# Patient Record
Sex: FEMALE | Race: BLACK OR AFRICAN AMERICAN
[De-identification: names, ages, dates, MRNs, and addresses within clinical notes are randomized per-mention and may not be internally consistent; named-entity substitution may affect disease eponyms.]

---

## 2019-09-04 ENCOUNTER — HOSPITAL ENCOUNTER (INPATIENT)
Dept: HOSPITAL 72 - 2E | Age: 84
LOS: 5 days | Discharge: NURSING, REHAB OR LONG-TERM CARE | DRG: 871 | End: 2019-09-09
Payer: MEDICARE

## 2019-09-04 VITALS — HEIGHT: 62 IN | BODY MASS INDEX: 42.31 KG/M2 | WEIGHT: 229.9 LBS

## 2019-09-04 VITALS — SYSTOLIC BLOOD PRESSURE: 127 MMHG | DIASTOLIC BLOOD PRESSURE: 68 MMHG

## 2019-09-04 DIAGNOSIS — J20.9: ICD-10-CM

## 2019-09-04 DIAGNOSIS — L03.116: ICD-10-CM

## 2019-09-04 DIAGNOSIS — E66.01: ICD-10-CM

## 2019-09-04 DIAGNOSIS — N17.9: ICD-10-CM

## 2019-09-04 DIAGNOSIS — I50.33: ICD-10-CM

## 2019-09-04 DIAGNOSIS — A40.1: Primary | ICD-10-CM

## 2019-09-04 DIAGNOSIS — I11.0: ICD-10-CM

## 2019-09-04 DIAGNOSIS — L89.310: ICD-10-CM

## 2019-09-04 DIAGNOSIS — Z79.82: ICD-10-CM

## 2019-09-04 DIAGNOSIS — I10: ICD-10-CM

## 2019-09-04 PROCEDURE — 82270 OCCULT BLOOD FECES: CPT

## 2019-09-04 PROCEDURE — 87070 CULTURE OTHR SPECIMN AEROBIC: CPT

## 2019-09-04 PROCEDURE — 89050 BODY FLUID CELL COUNT: CPT

## 2019-09-04 PROCEDURE — 84100 ASSAY OF PHOSPHORUS: CPT

## 2019-09-04 PROCEDURE — 85651 RBC SED RATE NONAUTOMATED: CPT

## 2019-09-04 PROCEDURE — 86140 C-REACTIVE PROTEIN: CPT

## 2019-09-04 PROCEDURE — 87040 BLOOD CULTURE FOR BACTERIA: CPT

## 2019-09-04 PROCEDURE — 83615 LACTATE (LD) (LDH) ENZYME: CPT

## 2019-09-04 PROCEDURE — 80202 ASSAY OF VANCOMYCIN: CPT

## 2019-09-04 PROCEDURE — 82746 ASSAY OF FOLIC ACID SERUM: CPT

## 2019-09-04 PROCEDURE — 84550 ASSAY OF BLOOD/URIC ACID: CPT

## 2019-09-04 PROCEDURE — 84484 ASSAY OF TROPONIN QUANT: CPT

## 2019-09-04 PROCEDURE — 80053 COMPREHEN METABOLIC PANEL: CPT

## 2019-09-04 PROCEDURE — 71045 X-RAY EXAM CHEST 1 VIEW: CPT

## 2019-09-04 PROCEDURE — 82550 ASSAY OF CK (CPK): CPT

## 2019-09-04 PROCEDURE — 82378 CARCINOEMBRYONIC ANTIGEN: CPT

## 2019-09-04 PROCEDURE — 82607 VITAMIN B-12: CPT

## 2019-09-04 PROCEDURE — 84300 ASSAY OF URINE SODIUM: CPT

## 2019-09-04 PROCEDURE — 93306 TTE W/DOPPLER COMPLETE: CPT

## 2019-09-04 PROCEDURE — 81001 URINALYSIS AUTO W/SCOPE: CPT

## 2019-09-04 PROCEDURE — 83735 ASSAY OF MAGNESIUM: CPT

## 2019-09-04 PROCEDURE — 93970 EXTREMITY STUDY: CPT

## 2019-09-04 PROCEDURE — 84133 ASSAY OF URINE POTASSIUM: CPT

## 2019-09-04 PROCEDURE — 85044 MANUAL RETICULOCYTE COUNT: CPT

## 2019-09-04 PROCEDURE — 85060 BLOOD SMEAR INTERPRETATION: CPT

## 2019-09-04 PROCEDURE — 83540 ASSAY OF IRON: CPT

## 2019-09-04 PROCEDURE — 83880 ASSAY OF NATRIURETIC PEPTIDE: CPT

## 2019-09-04 PROCEDURE — 87324 CLOSTRIDIUM AG IA: CPT

## 2019-09-04 PROCEDURE — 80048 BASIC METABOLIC PNL TOTAL CA: CPT

## 2019-09-04 PROCEDURE — 85025 COMPLETE CBC W/AUTO DIFF WBC: CPT

## 2019-09-04 PROCEDURE — 87205 SMEAR GRAM STAIN: CPT

## 2019-09-04 PROCEDURE — 85007 BL SMEAR W/DIFF WBC COUNT: CPT

## 2019-09-04 PROCEDURE — 36415 COLL VENOUS BLD VENIPUNCTURE: CPT

## 2019-09-04 PROCEDURE — 83550 IRON BINDING TEST: CPT

## 2019-09-04 PROCEDURE — 85610 PROTHROMBIN TIME: CPT

## 2019-09-04 PROCEDURE — 85730 THROMBOPLASTIN TIME PARTIAL: CPT

## 2019-09-04 NOTE — NUR
NURSE NOTES:



Received pt from [facility] via [facility] ambulance. Pt is being admitted for CHF. Pt is 
awake, AOx4. In no acute distress. VS stable. Placed pt on cardiac monitor showing SR. 
Oriented pt to room and unit. Bed in lowest position, call light within reach. Will contact 
MD for admission orders.

## 2019-09-05 VITALS — SYSTOLIC BLOOD PRESSURE: 124 MMHG | DIASTOLIC BLOOD PRESSURE: 56 MMHG

## 2019-09-05 VITALS — DIASTOLIC BLOOD PRESSURE: 63 MMHG | SYSTOLIC BLOOD PRESSURE: 131 MMHG

## 2019-09-05 VITALS — DIASTOLIC BLOOD PRESSURE: 60 MMHG | SYSTOLIC BLOOD PRESSURE: 130 MMHG

## 2019-09-05 VITALS — DIASTOLIC BLOOD PRESSURE: 51 MMHG | SYSTOLIC BLOOD PRESSURE: 116 MMHG

## 2019-09-05 VITALS — SYSTOLIC BLOOD PRESSURE: 133 MMHG | DIASTOLIC BLOOD PRESSURE: 65 MMHG

## 2019-09-05 LAB
ADD MANUAL DIFF: NO
ALBUMIN SERPL-MCNC: 2.4 G/DL (ref 3.4–5)
ALBUMIN/GLOB SERPL: 0.6 {RATIO} (ref 1–2.7)
ALP SERPL-CCNC: 45 U/L (ref 46–116)
ALT SERPL-CCNC: 7 U/L (ref 12–78)
ANION GAP SERPL CALC-SCNC: 14 MMOL/L (ref 5–15)
APPEARANCE UR: CLEAR
APTT PPP: (no result) S
AST SERPL-CCNC: 15 U/L (ref 15–37)
BASOPHILS NFR BLD AUTO: 0.4 % (ref 0–2)
BILIRUB SERPL-MCNC: 0.8 MG/DL (ref 0.2–1)
BUN SERPL-MCNC: 40 MG/DL (ref 7–18)
CALCIUM SERPL-MCNC: 8.8 MG/DL (ref 8.5–10.1)
CHLORIDE SERPL-SCNC: 107 MMOL/L (ref 98–107)
CK SERPL-CCNC: 54 U/L (ref 26–308)
CO2 SERPL-SCNC: 23 MMOL/L (ref 21–32)
CREAT SERPL-MCNC: 1.5 MG/DL (ref 0.55–1.3)
EOSINOPHIL NFR BLD AUTO: 0.9 % (ref 0–3)
ERYTHROCYTE [DISTWIDTH] IN BLOOD BY AUTOMATED COUNT: 12.5 % (ref 11.6–14.8)
GLOBULIN SER-MCNC: 3.9 G/DL
GLUCOSE UR STRIP-MCNC: NEGATIVE MG/DL
HCT VFR BLD CALC: 29.8 % (ref 37–47)
HGB BLD-MCNC: 10.2 G/DL (ref 12–16)
KETONES UR QL STRIP: NEGATIVE
LEUKOCYTE ESTERASE UR QL STRIP: NEGATIVE
LYMPHOCYTES NFR BLD AUTO: 8.4 % (ref 20–45)
MCV RBC AUTO: 85 FL (ref 80–99)
MONOCYTES NFR BLD AUTO: 9.2 % (ref 1–10)
NEUTROPHILS NFR BLD AUTO: 81.1 % (ref 45–75)
NITRITE UR QL STRIP: NEGATIVE
PH UR STRIP: 5 [PH] (ref 4.5–8)
PHOSPHATE SERPL-MCNC: 3.4 MG/DL (ref 2.5–4.9)
PLATELET # BLD: 159 K/UL (ref 150–450)
POTASSIUM SERPL-SCNC: 3 MMOL/L (ref 3.5–5.1)
PROT UR QL STRIP: NEGATIVE
RBC # BLD AUTO: 3.53 M/UL (ref 4.2–5.4)
SODIUM SERPL-SCNC: 144 MMOL/L (ref 136–145)
SP GR UR STRIP: 1 (ref 1–1.03)
UROBILINOGEN UR-MCNC: NORMAL MG/DL (ref 0–1)
WBC # BLD AUTO: 12.3 K/UL (ref 4.8–10.8)

## 2019-09-05 RX ADMIN — CARVEDILOL SCH MG: 12.5 TABLET, FILM COATED ORAL at 09:13

## 2019-09-05 RX ADMIN — SODIUM CHLORIDE SCH MLS/HR: 0.9 INJECTION INTRAVENOUS at 09:36

## 2019-09-05 RX ADMIN — CARVEDILOL SCH MG: 12.5 TABLET, FILM COATED ORAL at 20:38

## 2019-09-05 RX ADMIN — HEPARIN SODIUM SCH UNITS: 5000 INJECTION INTRAVENOUS; SUBCUTANEOUS at 09:35

## 2019-09-05 RX ADMIN — ASPIRIN SCH MG: 81 TABLET, DELAYED RELEASE ORAL at 09:13

## 2019-09-05 RX ADMIN — HEPARIN SODIUM SCH UNITS: 5000 INJECTION INTRAVENOUS; SUBCUTANEOUS at 20:36

## 2019-09-05 NOTE — NUR
NURSE NOTES:



Notified Dr. Yarbrough that pt's K level was 3.0 today, per Dr. Yarbrough okay to give Lasix 40 mg 
IVP dose.

## 2019-09-05 NOTE — NUR
NURSE NOTES:

Received patient from Jeffery Gue[facility]a. Patient is awake and alert. denies pain or discomfort. O2 @ 
2L/min via nasal cannula. Denies pain/Discomfort. safety precautions in place. side railsX2 
up for safety. bed locked to lowest position. call bell within patients reach. will follow.

## 2019-09-05 NOTE — NUR
NURSE NOTES:WOUND CARE NOTES:Morbidly obese pt whom presented on admission with multiple 
pressure injuries to R and L buttocks. 

Ful Thickness pressure injury R buttocks with 100% slough at base of wound. Surrounding 
non-blanching erythema without induration.(L)1.5cm x (W)0.6cm.

Full thickness pressure injury L buttocks with 100% slough at base of wound. Surrounding 
non-blanching erythema without induration.(L)1cm x (W)0.4cm.

R lower ext edematous, erythematous with Hyperkeratosis and hardening. R leg is malodorous. 
Ulcerations noted to distal dorsal and lateral R tibia and dorsal R foot . Small amt of 
serous exudate noted from ulcers.Gentle scrubbing to remove loosened skin provided. R lower 
ext remains malodorous post scrubbing. Moisturizing lotion applied to RLE. Xeroform gauze 
placed over each wound and wrapped with kerlix pending further instructions from Surgeon.

L lower ext less edematous in comparison to RLE . Hyperkeratosis with skin hardening noted 
.No erythema or ulcerations noted.Gentle scrubbing provided to remove loosened skin, then 
moisturized.Both lower ext elevated with pillows.   

Non-blanching erythema without fluctuance noted to both heels. Pt denied tenderness when 
each heel palpated.



Recommendations: Wound Care orders as per .

                          Elevate both lower ext with pillows.

                          Reposition at least every 2hours or as tolerated.

                          APM/MERRY Mattress overlay.

## 2019-09-05 NOTE — NUR
NURSE NOTES:



Received report from MARIELLA Walton. Pt is awake and resting in bed. In no acute distress. IV line 
intact and patent. Pt on cardiac monitor. Bed in lowest position, call light within reach. 
Will continue plan of care.

## 2019-09-05 NOTE — NUR
NURSE NOTES:

Received a call from Dr. Duff of [facility] regarding patients blood culture results 
2 out of 2 bottles positive. Dr. Yarbrough made aware new order received to consult ID Dr. Elizabeth. Will follow.

## 2019-09-05 NOTE — HISTORY & PHYSICAL
History and Physical


History & Physicial


Dictated for Int Med-DR Yarbrough no.[id number].











Sixto Hardy MD Sep 5, 2019 16:30

## 2019-09-05 NOTE — HISTORY AND PHYSICAL REPORT
DATE OF ADMISSION:  09/04/2019

CHIEF COMPLAINT:  The patient is an 84-year-old  female,

who presents with a chief complaint of shortness of breath.



HISTORY OF PRESENT ILLNESS:  Began on Sunday, September 1, 2019.  The

patient began to experience shortness of breath.  The patient was also

wheezing.  The patient also had edema of the left lower extremity.  The

patient states shortness of breath increased on September 4, 2019.  The

patient eventually called EMS.  The patient was transported initially to

[facility] emergency room.  The patient was found to have elevated

BNP.  The patient is transferred to [facility] for insurance

purposes.  The patient presents with chief complaint of shortness of

breath and congestive heart failure.



REVIEW OF SYSTEMS:  CONSTITUTIONAL:  The patient denies weight loss or

weight gain.  The patient denies fevers or chills.  HEENT:  The patient

denies ear or throat pain.  The patient denies headache.  CARDIOVASCULAR:

The patient denies palpitations or chest pain.  CHEST:  The patient

complains of shortness of breath and wheezing as above.  The patient

denies cough.  ABDOMEN:  The patient denies nausea, vomiting, diarrhea, or

constipation.  GENITOURINARY:  The patient denies dysuria or increased

frequency of urination.  NEUROMUSCULAR:  The patient denies seizures or

generalized weakness.



PAST MEDICAL HISTORY:  Significant for hypertension.



PAST SURGICAL HISTORY:  Significant for appendectomy.



CURRENT MEDICATIONS:

1. Aspirin 81 mg one tablet p.o. daily.

2. Calcium carbonate one tablet p.o. twice daily.

3. Carvedilol 25 mg p.o. twice daily.

4. Chlorthalidone 25 mg p.o. daily.

5. Lasix 20 mg p.o. twice daily.

6. Ibuprofen 200 mg p.o. q.6 hours p.r.n.

7. Losartan 50 mg p.o. daily.



ALLERGIES:  No known drug allergies.



SOCIAL HISTORY:  The patient is single and lives alone.  The patient denies

tobacco or alcohol use.



PHYSICAL EXAMINATION:

VITAL SIGNS:  Blood pressure 141/67, pulse 95, respirations 29 to 34, and

temperature 98.7 degrees Fahrenheit.

GENERAL:  The patient is a well-developed and well-nourished 

American female, in no apparent distress.

HEENT:  Eyes, pupils are equal and responsive to light and accommodation.

Extraocular movements are intact.

NECK:  Supple without lymphadenopathy.

CHEST:  Lungs are clear to auscultation bilaterally without wheezes or

rales.

CARDIOVASCULAR:  Regular rhythm and rate.  S1 and S2 are normal without

murmurs, rubs, or gallops.

ABDOMEN:  Soft, nontender, and nondistended.  Positive bowel sounds.  No

evidence of hepatosplenomegaly.  Currently, no rebound or guarding

noted.

EXTREMITIES:  Negative for clubbing, cyanosis, or edema.

NEUROLOGICAL:  Cranial nerves II through XII are grossly intact without

focal deficits.  Motor strength is 5/5 bilaterally.  Deep tendon reflexes

are 2+ plantar.



IMAGING:  A chest x-ray from [place] revealed cardiomegaly with increased

interstitial lung markings consistent with congestive heart failure.



LABORATORY STUDIES:  WBC 15.7, hemoglobin 11.2, hematocrit 34.5, and

platelets 201,000.  Sodium 143, potassium 3.3, chloride 103, CO2 24, BUN

40, and creatinine 1.75.  BNP elevated at 200.  Troponin less than 0.02.



ASSESSMENT:  This is an 84-year-old  female.



1. Shortness of breath.

2. Wheezing.

3. Edema of the bilateral lower extremities.

4. Congestive heart failure.

5. Hypertension.



TREATMENT:

1. Shortness of breath/wheezing/congestive heart failure.  Cardiology

consultation has been obtained with Dr. Carlos Ornelas.  An

echocardiogram is pending.  We will follow recommendation of Cardiology.

2. Hypertension.  Continue chlorthalidone, losartan, and carvedilol as

above.









  ______________________________________________

  Sixto Hardy M.D.





DR:  TANI

D:  09/05/2019 16:24

T:  09/05/2019 17:39

JOB#:  [id number]

CC:

## 2019-09-05 NOTE — CONSULTATION
History of Present Illness


General


Date patient seen:  Sep 5, 2019





Present Illness


HPI


84 year old, poor historian, with hx of CHF ( pt is on Lasix and Lisinopril), 

HTN, morbid obesity, was taken by paramedics to [facility] b/o increasing 

shortness of breath.  Her CXR at [place] only showed increased interstitial 

markings.  The physician at [place] thought that she has acute renal 

insufficiency, lymphedema and cellulitis.  She is transferred to [place] for 

further evaluation.


Allergies:  


Coded Allergies:  


     No Known Allergies (Unverified , 9/5/19)





Medication History


Scheduled


Aspirin* (Aspir 81*), 81 MG ORAL DAILY, (Reported)


Calcium Carbonate/Vitamin D3 (Calcium 600 + Vit D 200 Tablet), 1 EACH PO BID, (

Reported)


Carvedilol* (Carvedilol*), 25 MG ORAL BID, (Reported)


Chlorthalidone* (Chlorthalidone*), 25 MG ORAL DAILY, (Reported)


Furosemide* (Lasix*), 20 MG ORAL BID, (Reported)


Losartan Potassium* (Losartan Potassium*), 50 MG ORAL DAILY, (Reported)





Scheduled PRN


Ibuprofen* (Advil*), 200 MG ORAL Q6H PRN for Pain Scale (6-10), (Reported)





Patient History


Healthcare decision maker





Resuscitation status


Full Code


Advanced Directive on File








Past Medical/Surgical History


Past Medical/Surgical History:  


(1) Cellulitis


(2) History of hypertension


(3) Cardiomegaly


(4) Peripheral edema





Review of Systems


Respiratory:  Reports: shortness of breath





Physical Exam


General Appearance:  WD/WN, morbidly obese


Lines, tubes and drains:  peripheral


HEENT:  normocephalic, atraumatic


Neck:  non-tender, normal alignment


Respiratory/Chest:  chest wall non-tender, lungs clear


Breasts:  no masses


Cardiovascular/Chest:  normal peripheral pulses


Abdomen:  normal bowel sounds, non tender


Genitourinary/Rectal:  normal genital exam


Extremities:  normal range of motion


Skin Exam:  other - hyperpigmentation in lower extremities





Last 24 Hour Vital Signs








  Date Time  Temp Pulse Resp B/P (MAP) Pulse Ox O2 Delivery O2 Flow Rate FiO2


 


9/5/19 10:08 98.1       


 


9/5/19 09:13  84  133/65    


 


9/5/19 08:00  80      


 


9/5/19 08:00 98.1 82 23 133/65 (87) 96   


 


9/5/19 04:00 98.5 79 18 130/60 (83) 96   


 


9/5/19 04:00  79      


 


9/5/19 00:00      Nasal Cannula 2.0 


 


9/4/19 23:30 99.9 90 20 127/68 (87) 96   


 


9/4/19 23:30  90      











Laboratory Tests








Test


  9/5/19


07:18


 


White Blood Count


  12.3 K/UL


(4.8-10.8)  H


 


Red Blood Count


  3.53 M/UL


(4.20-5.40)  L


 


Hemoglobin


  10.2 G/DL


(12.0-16.0)  L


 


Hematocrit


  29.8 %


(37.0-47.0)  L


 


Mean Corpuscular Volume 85 FL (80-99)  


 


Mean Corpuscular Hemoglobin


  28.9 PG


(27.0-31.0)


 


Mean Corpuscular Hemoglobin


Concent 34.2 G/DL


(32.0-36.0)


 


Red Cell Distribution Width


  12.5 %


(11.6-14.8)


 


Platelet Count


  159 K/UL


(150-450)


 


Mean Platelet Volume


  6.5 FL


(6.5-10.1)


 


Neutrophils (%) (Auto)


  81.1 %


(45.0-75.0)  H


 


Lymphocytes (%) (Auto)


  8.4 %


(20.0-45.0)  L


 


Monocytes (%) (Auto)


  9.2 %


(1.0-10.0)


 


Eosinophils (%) (Auto)


  0.9 %


(0.0-3.0)


 


Basophils (%) (Auto)


  0.4 %


(0.0-2.0)


 


Sodium Level


  144 MMOL/L


(136-145)


 


Potassium Level


  3.0 MMOL/L


(3.5-5.1)  L


 


Chloride Level


  107 MMOL/L


()


 


Carbon Dioxide Level


  23 MMOL/L


(21-32)


 


Anion Gap


  14 mmol/L


(5-15)


 


Blood Urea Nitrogen


  40 mg/dL


(7-18)  H


 


Creatinine


  1.5 MG/DL


(0.55-1.30)  H


 


Estimat Glomerular Filtration


Rate  mL/min (>60)  


 


 


Glucose Level


  90 MG/DL


()


 


Calcium Level


  8.8 MG/DL


(8.5-10.1)


 


Phosphorus Level


  3.4 MG/DL


(2.5-4.9)


 


Magnesium Level


  1.9 MG/DL


(1.8-2.4)


 


Total Bilirubin


  0.8 MG/DL


(0.2-1.0)


 


Aspartate Amino Transf


(AST/SGOT) 15 U/L (15-37)


 


 


Alanine Aminotransferase


(ALT/SGPT) 7 U/L (12-78)


L


 


Alkaline Phosphatase


  45 U/L


()  L


 


Troponin I


  0.000 ng/mL


(0.000-0.056)


 


Pro-B-Type Natriuretic Peptide


  835 pg/mL


(0-125)  H


 


Total Protein


  6.3 G/DL


(6.4-8.2)  L


 


Albumin


  2.4 G/DL


(3.4-5.0)  L


 


Globulin 3.9 g/dL  


 


Albumin/Globulin Ratio


  0.6 (1.0-2.7)


L








Height (Feet):  5


Height (Inches):  2.00


Weight (Pounds):  220


Medications





Current Medications








 Medications


  (Trade)  Dose


 Ordered  Sig/Imelda


 Route


 PRN Reason  Start Time


 Stop Time Status Last Admin


Dose Admin


 


 Acetaminophen


  (Tylenol)  650 mg  Q6H  PRN


 ORAL


 Mild Pain/Temp > 100.5  9/5/19 06:45


 10/5/19 06:44   


 


 


 Aspirin


  (Ecotrin)  81 mg  DAILY


 ORAL


   9/5/19 09:00


 10/5/19 08:59  9/5/19 09:13


 


 


 Carvedilol


  (Coreg)  12.5 mg  EVERY 12  HOURS


 ORAL


   9/5/19 09:00


 10/5/19 08:59  9/5/19 09:13


 


 


 Ceftriaxone


 Sodium 1 gm/


 Dextrose  55 ml @ 


 110 mls/hr  DAILY


 IVPB


   9/5/19 09:00


 9/12/19 08:59  9/5/19 09:36


 


 


 Furosemide


  (Lasix)  40 mg  EVERY 12  HOURS


 IV


   9/5/19 09:00


 10/5/19 08:59  9/5/19 09:13


 


 


 Heparin Sodium


  (Porcine)


  (Heparin 5000


 units/ml)  5,000 units  EVERY 12  HOURS


 SUBQ


   9/5/19 09:00


 10/5/19 08:59  9/5/19 09:35


 


 


 Ibuprofen


  (Advil)  400 mg  Q8H  PRN


 ORAL


 Moderate Pain (Pain Scale 4-6)  9/5/19 06:45


 10/5/19 06:44  9/5/19 09:38


 


 


 Ondansetron HCl


  (Zofran)  4 mg  Q4H  PRN


 IVP


 Nausea & Vomiting  9/5/19 06:45


 10/5/19 06:44   


 


 


 Potassium Chloride


  (K-Dur)  40 meq  Q8H


 ORAL


   9/5/19 09:00


 9/6/19 01:01  9/5/19 09:13


 


 


 Vancomycin HCl


  (Vanco rx to


 dose)  1 ea  DAILY  PRN


 MISC


 Per rx protocol  9/5/19 06:45


 10/5/19 06:44   


 


 


 Vancomycin HCl/


 Dextrose  275 ml @ 


 137.5 mls/


 hr  ONCE


 IVPB


   9/5/19 11:00


 9/5/19 13:00   


 











Assessment/Plan


Problem List:  


(1) Acute bronchitis


ICD Codes:  J20.9 - Acute bronchitis, unspecified


SNOMED:  20407279


(2) Cellulitis


ICD Codes:  L03.90 - Cellulitis, unspecified


SNOMED:  309620577


(3) CHF (congestive heart failure)


ICD Codes:  I50.9 - Heart failure, unspecified


SNOMED:  47280939


(4) Cardiomegaly


ICD Codes:  I51.7 - Cardiomegaly


SNOMED:  9866998


(5) Peripheral edema


ICD Codes:  R60.9 - Edema, unspecified


SNOMED:  941902528


(6) History of hypertension


ICD Codes:  Z86.79 - Personal history of other diseases of the circulatory 

system


SNOMED:  397437026


(7) Morbid obesity


ICD Codes:  E66.01 - Morbid (severe) obesity due to excess calories


SNOMED:  750845793


Assessment/Plan:


check echo, doppler of legs


diuretics IV


monitor BP


renal studies


anemia w/u


dvt prophylaxis


respiratory treatment


antitussives


abx for bronchitis and cellulitis


check ESR.











Aleisha Lagunas MD Sep 5, 2019 10:58

## 2019-09-06 VITALS — SYSTOLIC BLOOD PRESSURE: 111 MMHG | DIASTOLIC BLOOD PRESSURE: 58 MMHG

## 2019-09-06 VITALS — DIASTOLIC BLOOD PRESSURE: 51 MMHG | SYSTOLIC BLOOD PRESSURE: 118 MMHG

## 2019-09-06 VITALS — DIASTOLIC BLOOD PRESSURE: 70 MMHG | SYSTOLIC BLOOD PRESSURE: 109 MMHG

## 2019-09-06 VITALS — SYSTOLIC BLOOD PRESSURE: 144 MMHG | DIASTOLIC BLOOD PRESSURE: 69 MMHG

## 2019-09-06 VITALS — SYSTOLIC BLOOD PRESSURE: 117 MMHG | DIASTOLIC BLOOD PRESSURE: 75 MMHG

## 2019-09-06 VITALS — DIASTOLIC BLOOD PRESSURE: 71 MMHG | SYSTOLIC BLOOD PRESSURE: 129 MMHG

## 2019-09-06 LAB
% IRON SATURATION: 11 % (ref 15–50)
ADD MANUAL DIFF: YES
ANION GAP SERPL CALC-SCNC: 10 MMOL/L (ref 5–15)
APTT BLD: 35 SEC (ref 23–33)
BUN SERPL-MCNC: 32 MG/DL (ref 7–18)
CALCIUM SERPL-MCNC: 8.7 MG/DL (ref 8.5–10.1)
CHLORIDE SERPL-SCNC: 108 MMOL/L (ref 98–107)
CO2 SERPL-SCNC: 27 MMOL/L (ref 21–32)
CREAT SERPL-MCNC: 1.3 MG/DL (ref 0.55–1.3)
ERYTHROCYTE [DISTWIDTH] IN BLOOD BY AUTOMATED COUNT: 12.6 % (ref 11.6–14.8)
HCT VFR BLD CALC: 33.8 % (ref 37–47)
HGB BLD-MCNC: 11 G/DL (ref 12–16)
INR PPP: 1 (ref 0.9–1.1)
IRON SERPL-MCNC: 17 UG/DL (ref 50–175)
LDH SERPL L TO P-CCNC: 242 U/L (ref 81–234)
MCV RBC AUTO: 87 FL (ref 80–99)
PHOSPHATE SERPL-MCNC: 2.4 MG/DL (ref 2.5–4.9)
PLATELET # BLD: 186 K/UL (ref 150–450)
POTASSIUM SERPL-SCNC: 4.2 MMOL/L (ref 3.5–5.1)
RBC # BLD AUTO: 3.88 M/UL (ref 4.2–5.4)
SODIUM SERPL-SCNC: 145 MMOL/L (ref 136–145)
TIBC SERPL-MCNC: 153 UG/DL (ref 250–450)
UNSATURATED IRON BINDING: 136 UG/DL (ref 112–346)
WBC # BLD AUTO: 13 K/UL (ref 4.8–10.8)

## 2019-09-06 RX ADMIN — ASPIRIN SCH MG: 81 TABLET, DELAYED RELEASE ORAL at 08:45

## 2019-09-06 RX ADMIN — SODIUM CHLORIDE SCH MLS/HR: 0.9 INJECTION INTRAVENOUS at 08:47

## 2019-09-06 RX ADMIN — CARVEDILOL SCH MG: 12.5 TABLET, FILM COATED ORAL at 20:54

## 2019-09-06 RX ADMIN — HYDROCODONE BITARTRATE AND ACETAMINOPHEN PRN TAB: 5; 325 TABLET ORAL at 09:54

## 2019-09-06 RX ADMIN — HEPARIN SODIUM SCH UNITS: 5000 INJECTION INTRAVENOUS; SUBCUTANEOUS at 20:54

## 2019-09-06 RX ADMIN — HYDROCODONE BITARTRATE AND ACETAMINOPHEN PRN TAB: 5; 325 TABLET ORAL at 15:38

## 2019-09-06 RX ADMIN — HEPARIN SODIUM SCH UNITS: 5000 INJECTION INTRAVENOUS; SUBCUTANEOUS at 08:46

## 2019-09-06 RX ADMIN — HYDROCODONE BITARTRATE AND ACETAMINOPHEN PRN TAB: 5; 325 TABLET ORAL at 19:53

## 2019-09-06 RX ADMIN — HYDROCODONE BITARTRATE AND ACETAMINOPHEN PRN TAB: 5; 325 TABLET ORAL at 03:53

## 2019-09-06 RX ADMIN — CARVEDILOL SCH MG: 12.5 TABLET, FILM COATED ORAL at 08:45

## 2019-09-06 NOTE — NUR
NURSE NOTES:

Received pt and report from MARIELLA Woods. Observed pt resting in bed with both eyes closed; 
arousable to voice. Pt is A/O x4. Cardiac monitor is in placed, IV site intact, 
asymptomatic, and patent. Bed is in the lowest position and locked. Call light within each. 
No signs/symptoms of acute distress noted at this time. Will continue plan of care.

## 2019-09-06 NOTE — CONSULTATION
History of Present Illness


General


Date patient seen:  Sep 5, 2019


Time patient seen:  12:00


Referring physician:  Janneth


Reason for Consultation:  Buttock ulcer





Present Illness


HPI


Asked to evaluate this patient for right medial buttock ulcer. She was admitted 

to [facility] [place] and was found to have this ulcer on admission. She lives at home 

and per history is ambulatory. She has a h/o chronic venous insufficiency and 

LLE ulcers that has been managed by her podiatrist in the past. She is 

apparently not aware of having a buttock ulcer.


Allergies:  


Coded Allergies:  


     No Known Allergies (Unverified , 9/5/19)





Medication History


Scheduled


Aspirin* (Aspir 81*), 81 MG ORAL DAILY, (Reported)


Calcium Carbonate/Vitamin D3 (Calcium 600 + Vit D 200 Tablet), 1 EACH PO BID, (

Reported)


Carvedilol* (Carvedilol*), 25 MG ORAL BID, (Reported)


Chlorthalidone* (Chlorthalidone*), 25 MG ORAL DAILY, (Reported)


Furosemide* (Lasix*), 20 MG ORAL BID, (Reported)


Losartan Potassium* (Losartan Potassium*), 50 MG ORAL DAILY, (Reported)





Scheduled PRN


Ibuprofen* (Advil*), 200 MG ORAL Q6H PRN for Pain Scale (6-10), (Reported)





Patient History


History Provided By:  Patient, Medical Record


Healthcare decision maker





Resuscitation status


Full Code


Advanced Directive on File








Past Medical/Surgical History


Past Medical/Surgical History:  


(1) Morbid obesity


(2) History of hypertension


(3) Acute on chronic diastolic congestive heart failure


(4) Mild pulmonary hypertension





Review of Systems


Respiratory:  Reports: see HPI


Musculoskeletal:  Reports: back pain


Skin:  Reports: see HPI





Physical Exam


General Appearance:  WD/WN, no apparent distress, alert, morbidly obese


Lines, tubes and drains:  peripheral


Extremities:  moderate edema


Skin Exam:  other - Ulcer of right medial buttock is unstageable. Skin around 

sacral area is stage 1. No erythema or warmth. No fluctuance.





Last 24 Hour Vital Signs








  Date Time  Temp Pulse Resp B/P (MAP) Pulse Ox O2 Delivery O2 Flow Rate FiO2


 


9/6/19 16:00  79      


 


9/6/19 16:00 97.9 85 20 109/70 (83) 94   


 


9/6/19 12:20  72      


 


9/6/19 11:45 98.4 74 18 111/58 (75) 96   


 


9/6/19 09:00      Nasal Cannula 2.0 


 


9/6/19 08:45  73  144/69    


 


9/6/19 08:00 97.7 73 18 144/69 (94) 96   


 


9/6/19 04:00  69      


 


9/6/19 04:00 97.7 69 19 117/75 (89) 99   


 


9/6/19 00:00 97.0 68 20 118/51 (73) 96   


 


9/5/19 21:00      Nasal Cannula 2.0 


 


9/5/19 20:38  75  124/56    


 


9/5/19 20:00  71      


 


9/5/19 20:00 98.2 71 20 124/56 (78) 98   

















Intake and Output  


 


 9/5/19 9/6/19





 19:00 07:00


 


Intake Total 120 ml 200 ml


 


Output Total 800 ml 1500 ml


 


Balance -680 ml -1300 ml


 


  


 


Intake Oral 120 ml 200 ml


 


Output Urine Total 800 ml 1500 ml


 


# Bowel Movements 1 1











Laboratory Tests








Test


  9/6/19


05:19


 


White Blood Count


  13.0 K/UL


(4.8-10.8)  H


 


Red Blood Count


  3.88 M/UL


(4.20-5.40)  L


 


Hemoglobin


  11.0 G/DL


(12.0-16.0)  L


 


Hematocrit


  33.8 %


(37.0-47.0)  L


 


Mean Corpuscular Volume 87 FL (80-99)  


 


Mean Corpuscular Hemoglobin


  28.4 PG


(27.0-31.0)


 


Mean Corpuscular Hemoglobin


Concent 32.7 G/DL


(32.0-36.0)


 


Red Cell Distribution Width


  12.6 %


(11.6-14.8)


 


Platelet Count


  186 K/UL


(150-450)


 


Mean Platelet Volume


  6.8 FL


(6.5-10.1)


 


Neutrophils (%) (Auto)


  % (45.0-75.0)


 


 


Lymphocytes (%) (Auto)


  % (20.0-45.0)


 


 


Monocytes (%) (Auto)  % (1.0-10.0)  


 


Eosinophils (%) (Auto)  % (0.0-3.0)  


 


Basophils (%) (Auto)  % (0.0-2.0)  


 


Differential Total Cells


Counted 100  


 


 


Neutrophils % (Manual) 77 % (45-75)  H


 


Lymphocytes % (Manual) 12 % (20-45)  L


 


Monocytes % (Manual) 7 % (1-10)  


 


Eosinophils % (Manual) 4 % (0-3)  H


 


Basophils % (Manual) 0 % (0-2)  


 


Band Neutrophils 0 % (0-8)  


 


Other Cell Type


  Pathologist


review


 


Platelet Estimate Adequate  


 


Platelet Morphology Normal  


 


Red Blood Cell Morphology Normal  


 


Erythrocyte Sedimentation Rate


  74 MM/HR


(0-30)  H


 


Reticulocyte Count


  0.3 %


(0.5-2.0)  L


 


Prothrombin Time


  10.6 SEC


(9.30-11.50)


 


Prothromb Time International


Ratio 1.0 (0.9-1.1)  


 


 


Activated Partial


Thromboplast Time 35 SEC (23-33)


H


 


Sodium Level


  145 MMOL/L


(136-145)


 


Potassium Level


  4.2 MMOL/L


(3.5-5.1)


 


Chloride Level


  108 MMOL/L


()  H


 


Carbon Dioxide Level


  27 MMOL/L


(21-32)


 


Anion Gap


  10 mmol/L


(5-15)


 


Blood Urea Nitrogen


  32 mg/dL


(7-18)  H


 


Creatinine


  1.3 MG/DL


(0.55-1.30)


 


Estimat Glomerular Filtration


Rate  mL/min (>60)  


 


 


Glucose Level


  111 MG/DL


()  H


 


Calcium Level


  8.7 MG/DL


(8.5-10.1)


 


Phosphorus Level


  2.4 MG/DL


(2.5-4.9)  L


 


Magnesium Level


  1.7 MG/DL


(1.8-2.4)  L


 


Iron Level


  17 ug/dL


()  L


 


Total Iron Binding Capacity


  153 ug/dL


(250-450)  L


 


Percent Iron Saturation 11 % (15-50)  L


 


Unsaturated Iron Binding


  136 ug/dL


(112-346)


 


Lactate Dehydrogenase


  242 U/L


()  H


 


Troponin I


  0.000 ng/mL


(0.000-0.056)


 


C-Reactive Protein,


Quantitative 35.4 mg/dL


(0.00-0.90)  H


 


Pro-B-Type Natriuretic Peptide


  2535 pg/mL


(0-125)  H


 


Carcinoembryonic Antigen Pending  


 


Vitamin B12 Level


  728 PG/ML


(193-986)


 


Folate


  4.0 NG/ML


(8.6-58.9)  L


 


Random Vancomycin Level 14.4 ug/mL  








Height (Feet):  5


Height (Inches):  2.00


Weight (Pounds):  210


Medications





Current Medications








 Medications


  (Trade)  Dose


 Ordered  Sig/Imelda


 Route


 PRN Reason  Start Time


 Stop Time Status Last Admin


Dose Admin


 


 Acetaminophen


  (Tylenol)  650 mg  Q6H  PRN


 ORAL


 Mild Pain/Temp > 100.5  9/5/19 06:45


 10/5/19 06:44  9/6/19 03:01


 


 


 Acetaminophen/


 Hydrocodone Bitart


  (Norco 5/325)  1 tab  Q4H  PRN


 ORAL


 Moderate Pain (Pain Scale 4-6)  9/6/19 11:15


 9/13/19 11:14  9/6/19 15:38


 


 


 Albuterol/


 Ipratropium


  (Albuterol/


 Ipratropium)  3 ml  Q4H  PRN


 HHN


 Shortness of Breath  9/6/19 11:15


 9/11/19 11:14   


 


 


 Aspirin


  (Ecotrin)  81 mg  DAILY


 ORAL


   9/5/19 09:00


 10/5/19 08:59  9/6/19 08:45


 


 


 Carvedilol


  (Coreg)  12.5 mg  EVERY 12  HOURS


 ORAL


   9/5/19 09:00


 10/5/19 08:59  9/6/19 08:45


 


 


 Ceftriaxone


 Sodium 1 gm/


 Dextrose  55 ml @ 


 110 mls/hr  DAILY


 IVPB


   9/5/19 09:00


 9/12/19 08:59  9/6/19 08:47


 


 


 Furosemide


  (Lasix)  40 mg  EVERY 12  HOURS


 IV


   9/6/19 21:00


 10/5/19 08:59   


 


 


 Heparin Sodium


  (Porcine)


  (Heparin 5000


 units/ml)  5,000 units  EVERY 12  HOURS


 SUBQ


   9/5/19 09:00


 10/5/19 08:59  9/6/19 08:46


 


 


 Ondansetron HCl


  (Zofran)  4 mg  Q4H  PRN


 IVP


 Nausea & Vomiting  9/5/19 06:45


 10/5/19 06:44   


 


 


 Promethazine HCl/


 Codeine


  (Phenergan with


 Codeine)  5 ml  Q4H  PRN


 ORAL


 For Cough  9/5/19 11:00


 10/5/19 10:59   


 


 


 Vancomycin HCl


  (Vanco rx to


 dose)  1 ea  DAILY  PRN


 MISC


 Per rx protocol  9/5/19 06:45


 10/5/19 06:44   


 


 


 Vancomycin HCl/


 Dextrose  275 ml @ 


 137.5 mls/


 hr  Q24H


 IVPB


   9/7/19 09:00


 9/12/19 08:59   


 











Assessment/Plan


Status:  stable


Assessment/Plan:


Patient with unstageable ulcer of the right medial buttock. Recommend medihoney 

to the area to loosen the slough and determine more accurate staging. Need to 

keep moisture under control and offload every 2 hours. No need for surgical 

intervention at this time. Thank you.











Rafiq Spear MD Sep 6, 2019 17:45

## 2019-09-06 NOTE — INTERNAL MED PROGRESS NOTE
Subjective


Physician Name


Chris Yarbrough


Attending Physician


Chris Yarbrough MD





Current Medications








 Medications


  (Trade)  Dose


 Ordered  Sig/Imelda


 Route


 PRN Reason  Start Time


 Stop Time Status Last Admin


Dose Admin


 


 Acetaminophen


  (Tylenol)  650 mg  Q6H  PRN


 ORAL


 Mild Pain/Temp > 100.5  9/5/19 06:45


 10/5/19 06:44  9/6/19 03:01


 


 


 Acetaminophen/


 Hydrocodone Bitart


  (Norco 5/325)  1 tab  Q4H  PRN


 ORAL


 Moderate Pain (Pain Scale 4-6)  9/6/19 11:15


 9/13/19 11:14   


 


 


 Albuterol/


 Ipratropium


  (Albuterol/


 Ipratropium)  3 ml  Q4H  PRN


 HHN


 Shortness of Breath  9/6/19 11:15


 9/11/19 11:14   


 


 


 Aspirin


  (Ecotrin)  81 mg  DAILY


 ORAL


   9/5/19 09:00


 10/5/19 08:59  9/6/19 08:45


 


 


 Carvedilol


  (Coreg)  12.5 mg  EVERY 12  HOURS


 ORAL


   9/5/19 09:00


 10/5/19 08:59  9/6/19 08:45


 


 


 Ceftriaxone


 Sodium 1 gm/


 Dextrose  55 ml @ 


 110 mls/hr  DAILY


 IVPB


   9/5/19 09:00


 9/12/19 08:59  9/6/19 08:47


 


 


 Furosemide


  (Lasix)  40 mg  EVERY 12  HOURS


 IV


   9/6/19 21:00


 10/5/19 08:59   


 


 


 Heparin Sodium


  (Porcine)


  (Heparin 5000


 units/ml)  5,000 units  EVERY 12  HOURS


 SUBQ


   9/5/19 09:00


 10/5/19 08:59  9/6/19 08:46


 


 


 Ondansetron HCl


  (Zofran)  4 mg  Q4H  PRN


 IVP


 Nausea & Vomiting  9/5/19 06:45


 10/5/19 06:44   


 


 


 Promethazine HCl/


 Codeine


  (Phenergan with


 Codeine)  5 ml  Q4H  PRN


 ORAL


 For Cough  9/5/19 11:00


 10/5/19 10:59   


 


 


 Vancomycin HCl


  (Vanco rx to


 dose)  1 ea  DAILY  PRN


 MISC


 Per rx protocol  9/5/19 06:45


 10/5/19 06:44   


 


 


 Vancomycin HCl/


 Dextrose  275 ml @ 


 137.5 mls/


 hr  Q24H


 IVPB


   9/7/19 09:00


 9/12/19 08:59   


 








Allergies:  


Coded Allergies:  


     No Known Allergies (Unverified , 9/5/19)


Subjective


awake, alert, responsive, NAD, No CP or SOB.





Objective





Last Vital Signs








  Date Time  Temp Pulse Resp B/P (MAP) Pulse Ox O2 Delivery O2 Flow Rate FiO2


 


9/6/19 12:20  72      


 


9/6/19 11:45 98.4  18 111/58 (75) 96   


 


9/6/19 09:00      Nasal Cannula 2.0 











Laboratory Tests








Test


  9/6/19


05:19


 


White Blood Count


  13.0 K/UL


(4.8-10.8)  H


 


Red Blood Count


  3.88 M/UL


(4.20-5.40)  L


 


Hemoglobin


  11.0 G/DL


(12.0-16.0)  L


 


Hematocrit


  33.8 %


(37.0-47.0)  L


 


Mean Corpuscular Volume 87 FL (80-99)  


 


Mean Corpuscular Hemoglobin


  28.4 PG


(27.0-31.0)


 


Mean Corpuscular Hemoglobin


Concent 32.7 G/DL


(32.0-36.0)


 


Red Cell Distribution Width


  12.6 %


(11.6-14.8)


 


Platelet Count


  186 K/UL


(150-450)


 


Mean Platelet Volume


  6.8 FL


(6.5-10.1)


 


Neutrophils (%) (Auto)


  % (45.0-75.0)


 


 


Lymphocytes (%) (Auto)


  % (20.0-45.0)


 


 


Monocytes (%) (Auto)  % (1.0-10.0)  


 


Eosinophils (%) (Auto)  % (0.0-3.0)  


 


Basophils (%) (Auto)  % (0.0-2.0)  


 


Differential Total Cells


Counted 100  


 


 


Neutrophils % (Manual) 77 % (45-75)  H


 


Lymphocytes % (Manual) 12 % (20-45)  L


 


Monocytes % (Manual) 7 % (1-10)  


 


Eosinophils % (Manual) 4 % (0-3)  H


 


Basophils % (Manual) 0 % (0-2)  


 


Band Neutrophils 0 % (0-8)  


 


Other Cell Type


  Pathologist


review


 


Platelet Estimate Adequate  


 


Platelet Morphology Normal  


 


Red Blood Cell Morphology Normal  


 


Erythrocyte Sedimentation Rate


  74 MM/HR


(0-30)  H


 


Reticulocyte Count


  0.3 %


(0.5-2.0)  L


 


Prothrombin Time


  10.6 SEC


(9.30-11.50)


 


Prothromb Time International


Ratio 1.0 (0.9-1.1)  


 


 


Activated Partial


Thromboplast Time 35 SEC (23-33)


H


 


Sodium Level


  145 MMOL/L


(136-145)


 


Potassium Level


  4.2 MMOL/L


(3.5-5.1)


 


Chloride Level


  108 MMOL/L


()  H


 


Carbon Dioxide Level


  27 MMOL/L


(21-32)


 


Anion Gap


  10 mmol/L


(5-15)


 


Blood Urea Nitrogen


  32 mg/dL


(7-18)  H


 


Creatinine


  1.3 MG/DL


(0.55-1.30)


 


Estimat Glomerular Filtration


Rate  mL/min (>60)  


 


 


Glucose Level


  111 MG/DL


()  H


 


Calcium Level


  8.7 MG/DL


(8.5-10.1)


 


Phosphorus Level


  2.4 MG/DL


(2.5-4.9)  L


 


Magnesium Level


  1.7 MG/DL


(1.8-2.4)  L


 


Iron Level


  17 ug/dL


()  L


 


Total Iron Binding Capacity


  153 ug/dL


(250-450)  L


 


Percent Iron Saturation 11 % (15-50)  L


 


Unsaturated Iron Binding


  136 ug/dL


(112-346)


 


Lactate Dehydrogenase


  242 U/L


()  H


 


Troponin I


  0.000 ng/mL


(0.000-0.056)


 


C-Reactive Protein,


Quantitative 35.4 mg/dL


(0.00-0.90)  H


 


Pro-B-Type Natriuretic Peptide


  2535 pg/mL


(0-125)  H


 


Carcinoembryonic Antigen Pending  


 


Vitamin B12 Level


  728 PG/ML


(193-986)


 


Folate


  4.0 NG/ML


(8.6-58.9)  L


 


Random Vancomycin Level 14.4 ug/mL  











Microbiology








 Date/Time


Source Procedure


Growth Status


 


 


 9/5/19 14:25


Stool Clostridium difficile Toxin Assay - Final Complete

















Intake and Output  


 


 9/5/19 9/6/19





 18:59 06:59


 


Intake Total 120 ml 200 ml


 


Output Total 800 ml 1500 ml


 


Balance -680 ml -1300 ml


 


  


 


Intake Oral 120 ml 200 ml


 


Output Urine Total 800 ml 1500 ml


 


# Bowel Movements 1 1








Objective


General: No acute distress, awake and alert


HEENT: NCAT, sclera anicteric, PERRL, EOMI.


Neck: Supple, no significant jugular venous distention, 


Lungs: Fair inspiratory effort, decrease air at bases, no Wheeze or Rales.


Heart: Regular rate and rhythm, normal S1/S2, no murmur.


Abdomen: soft, nontender, nondistended. Normoactive bowel sounds, morbid 

obesity.


 / Rectal: Refused and deferred.


Extremities: No Cyanosis , clubbing, Bilateral LE's edema resolved. Left Leg 

dressing.


Neuro: A&O x 3, Able to move all extremities


Skin: warm, no rash.


Psych: Normal mood and affect





Assessment/Plan


Assessment/Plan


(1) Acute on chronic diastolic congestive heart failure


(2) Acute bronchitis


(3) Cellulitis


(4) Cardiomegaly


(5) Mild pulmonary hypertension


(6) Peripheral edema


(7) Morbid obesity


(8) History of hypertension





Plan:


Monitor cultures and labs


Abx: Vanco IV, Rocephin IV


Discuss about Dc planning, SNF placement


PT Mobility


Full code


Heparin SQ











Chris Yarbrough MD Sep 6, 2019 15:19

## 2019-09-06 NOTE — CONSULTATION
History of Present Illness


General


Date patient seen:  Sep 6, 2019





Present Illness


HPI


85 y/o F with hx of CHF, HTN, s/p appendectomy, morbid obesity is transferred 

from [facility] to [place] on 9/5 for 3 days of increasing SOB and wheezing. 

CXR at [place] showed increased interstitial markings. Patient also had L LE 

edema


 


Denied f/c, n/v/d, urinary symptoms.


Allergies:  


Coded Allergies:  


     No Known Allergies (Unverified , 9/5/19)





Medication History


Scheduled


Aspirin* (Aspir 81*), 81 MG ORAL DAILY, (Reported)


Calcium Carbonate/Vitamin D3 (Calcium 600 + Vit D 200 Tablet), 1 EACH PO BID, (

Reported)


Carvedilol* (Carvedilol*), 25 MG ORAL BID, (Reported)


Chlorthalidone* (Chlorthalidone*), 25 MG ORAL DAILY, (Reported)


Furosemide* (Lasix*), 20 MG ORAL BID, (Reported)


Losartan Potassium* (Losartan Potassium*), 50 MG ORAL DAILY, (Reported)





Scheduled PRN


Ibuprofen* (Advil*), 200 MG ORAL Q6H PRN for Pain Scale (6-10), (Reported)





Patient History


Healthcare decision maker





Resuscitation status


Full Code


Advanced Directive on File





Patient History Narrative





Pmhx: as above





Shx: The patient is single and lives alone.  The patient denies tobacco or 

alcohol use.





Fhx: non contributory





Review of Systems


All Other Systems:  negative except mentioned in HPI





Physical Exam


Physical Exam Narrative


General Appearance:  WD/WN, morbidly obese


Lines, tubes and drains:  peripheral


HEENT:  normocephalic, atraumatic


Neck:  non-tender, normal alignment


Respiratory/Chest:  chest wall non-tender, lungs clear


Cardiovascular/Chest:  normal peripheral pulses


Abdomen:  normal bowel sounds, non tender


Extremities:  normal range of motion


Skin Exam:  other - hyperpigmentation in lower extremities





Last 24 Hour Vital Signs








  Date Time  Temp Pulse Resp B/P (MAP) Pulse Ox O2 Delivery O2 Flow Rate FiO2


 


9/6/19 09:00      Nasal Cannula 2.0 


 


9/6/19 08:45  73  144/69    


 


9/6/19 08:00 97.7 73 18 144/69 (94) 96   


 


9/6/19 04:00  69      


 


9/6/19 04:00 97.7 69 19 117/75 (89) 99   


 


9/6/19 00:00 97.0 68 20 118/51 (73) 96   


 


9/5/19 21:00      Nasal Cannula 2.0 


 


9/5/19 20:38  75  124/56    


 


9/5/19 20:00  71      


 


9/5/19 20:00 98.2 71 20 124/56 (78) 98   


 


9/5/19 16:00 99.5 69 20 116/51 (72) 98   


 


9/5/19 16:00  65      


 


9/5/19 12:00 97.9 58 25 131/63 (85) 96   


 


9/5/19 12:00  70      

















Intake and Output  


 


 9/5/19 9/6/19





 18:59 06:59


 


Intake Total 120 ml 200 ml


 


Output Total 800 ml 1500 ml


 


Balance -680 ml -1300 ml


 


  


 


Intake Oral 120 ml 200 ml


 


Output Urine Total 800 ml 1500 ml


 


# Bowel Movements 1 1











Laboratory Tests








Test


  9/5/19


11:43 9/5/19


14:25 9/6/19


05:19


 


Urine Color Pale yellow    


 


Urine Appearance Clear    


 


Urine pH 5 (4.5-8.0)    


 


Urine Specific Gravity


  1.005


(1.005-1.035) 


  


 


 


Urine Protein


  Negative


(NEGATIVE) 


  


 


 


Urine Glucose (UA)


  Negative


(NEGATIVE) 


  


 


 


Urine Ketones


  Negative


(NEGATIVE) 


  


 


 


Urine Blood


  Negative


(NEGATIVE) 


  


 


 


Urine Nitrite


  Negative


(NEGATIVE) 


  


 


 


Urine Bilirubin


  Negative


(NEGATIVE) 


  


 


 


Urine Urobilinogen


  Normal MG/DL


(0.0-1.0) 


  


 


 


Urine Leukocyte Esterase


  Negative


(NEGATIVE) 


  


 


 


Urine RBC


  0 /HPF (0 - 2)


  


  


 


 


Urine WBC


  0 /HPF (0 - 2)


  


  


 


 


Urine Squamous Epithelial


Cells Occasional


/LPF 


  


 


 


Urine Bacteria


  Occasional


/HPF (NONE) 


  


 


 


Urine Eosinophils


  None seen


(NONE SEEN) 


  


 


 


Urine Random Sodium


  105 mmol/L


() 


  


 


 


Urine Potassium Timed


  10 mmol/L


(12-62)  L 


  


 


 


Stool Occult Blood


  


  Negative


(NEGATIVE) 


 


 


White Blood Count


  


  


  13.0 K/UL


(4.8-10.8)  H


 


Red Blood Count


  


  


  3.88 M/UL


(4.20-5.40)  L


 


Hemoglobin


  


  


  11.0 G/DL


(12.0-16.0)  L


 


Hematocrit


  


  


  33.8 %


(37.0-47.0)  L


 


Mean Corpuscular Volume   87 FL (80-99)  


 


Mean Corpuscular Hemoglobin


  


  


  28.4 PG


(27.0-31.0)


 


Mean Corpuscular Hemoglobin


Concent 


  


  32.7 G/DL


(32.0-36.0)


 


Red Cell Distribution Width


  


  


  12.6 %


(11.6-14.8)


 


Platelet Count


  


  


  186 K/UL


(150-450)


 


Mean Platelet Volume


  


  


  6.8 FL


(6.5-10.1)


 


Neutrophils (%) (Auto)


  


  


  % (45.0-75.0)


 


 


Lymphocytes (%) (Auto)


  


  


  % (20.0-45.0)


 


 


Monocytes (%) (Auto)    % (1.0-10.0)  


 


Eosinophils (%) (Auto)    % (0.0-3.0)  


 


Basophils (%) (Auto)    % (0.0-2.0)  


 


Differential Total Cells


Counted 


  


  100  


 


 


Neutrophils % (Manual)   77 % (45-75)  H


 


Lymphocytes % (Manual)   12 % (20-45)  L


 


Monocytes % (Manual)   7 % (1-10)  


 


Eosinophils % (Manual)   4 % (0-3)  H


 


Basophils % (Manual)   0 % (0-2)  


 


Band Neutrophils   0 % (0-8)  


 


Platelet Estimate   Adequate  


 


Platelet Morphology   Normal  


 


Red Blood Cell Morphology   Normal  


 


Erythrocyte Sedimentation Rate


  


  


  74 MM/HR


(0-30)  H


 


Reticulocyte Count


  


  


  0.3 %


(0.5-2.0)  L


 


Prothrombin Time


  


  


  10.6 SEC


(9.30-11.50)


 


Prothromb Time International


Ratio 


  


  1.0 (0.9-1.1)  


 


 


Activated Partial


Thromboplast Time 


  


  35 SEC (23-33)


H


 


Sodium Level


  


  


  145 MMOL/L


(136-145)


 


Potassium Level


  


  


  4.2 MMOL/L


(3.5-5.1)


 


Chloride Level


  


  


  108 MMOL/L


()  H


 


Carbon Dioxide Level


  


  


  27 MMOL/L


(21-32)


 


Anion Gap


  


  


  10 mmol/L


(5-15)


 


Blood Urea Nitrogen


  


  


  32 mg/dL


(7-18)  H


 


Creatinine


  


  


  1.3 MG/DL


(0.55-1.30)


 


Estimat Glomerular Filtration


Rate 


  


   mL/min (>60)  


 


 


Glucose Level


  


  


  111 MG/DL


()  H


 


Calcium Level


  


  


  8.7 MG/DL


(8.5-10.1)


 


Phosphorus Level


  


  


  2.4 MG/DL


(2.5-4.9)  L


 


Magnesium Level


  


  


  1.7 MG/DL


(1.8-2.4)  L


 


Iron Level


  


  


  17 ug/dL


()  L


 


Total Iron Binding Capacity


  


  


  153 ug/dL


(250-450)  L


 


Percent Iron Saturation   11 % (15-50)  L


 


Unsaturated Iron Binding


  


  


  136 ug/dL


(112-346)


 


Lactate Dehydrogenase


  


  


  242 U/L


()  H


 


Troponin I


  


  


  0.000 ng/mL


(0.000-0.056)


 


C-Reactive Protein,


Quantitative 


  


  35.4 mg/dL


(0.00-0.90)  H


 


Pro-B-Type Natriuretic Peptide


  


  


  2535 pg/mL


(0-125)  H


 


Carcinoembryonic Antigen   Pending  


 


Vitamin B12 Level


  


  


  728 PG/ML


(193-986)


 


Folate


  


  


  4.0 NG/ML


(8.6-58.9)  L


 


Random Vancomycin Level   14.4 ug/mL  











Microbiology








 Date/Time


Source Procedure


Growth Status


 


 


 9/5/19 14:25


Stool Clostridium difficile Toxin Assay - Final Complete








Height (Feet):  5


Height (Inches):  2.00


Weight (Pounds):  210


Medications





Current Medications








 Medications


  (Trade)  Dose


 Ordered  Sig/Imelda


 Route


 PRN Reason  Start Time


 Stop Time Status Last Admin


Dose Admin


 


 Acetaminophen


  (Tylenol)  650 mg  Q6H  PRN


 ORAL


 Mild Pain/Temp > 100.5  9/5/19 06:45


 10/5/19 06:44  9/6/19 03:01


 


 


 Acetaminophen/


 Hydrocodone Bitart


  (Norco 5/325)  1 tab  Q4H  PRN


 ORAL


 Moderate Pain (Pain Scale 4-6)  9/6/19 11:15


 9/13/19 11:14   


 


 


 Albuterol/


 Ipratropium


  (Albuterol/


 Ipratropium)  3 ml  Q4H  PRN


 HHN


 Shortness of Breath  9/6/19 11:15


 9/11/19 11:14   


 


 


 Aspirin


  (Ecotrin)  81 mg  DAILY


 ORAL


   9/5/19 09:00


 10/5/19 08:59  9/6/19 08:45


 


 


 Carvedilol


  (Coreg)  12.5 mg  EVERY 12  HOURS


 ORAL


   9/5/19 09:00


 10/5/19 08:59  9/6/19 08:45


 


 


 Ceftriaxone


 Sodium 1 gm/


 Dextrose  55 ml @ 


 110 mls/hr  DAILY


 IVPB


   9/5/19 09:00


 9/12/19 08:59  9/6/19 08:47


 


 


 Furosemide


  (Lasix)  20 mg  EVERY 12  HOURS


 IV


   9/6/19 21:00


 10/5/19 08:59   


 


 


 Heparin Sodium


  (Porcine)


  (Heparin 5000


 units/ml)  5,000 units  EVERY 12  HOURS


 SUBQ


   9/5/19 09:00


 10/5/19 08:59  9/6/19 08:46


 


 


 Ondansetron HCl


  (Zofran)  4 mg  Q4H  PRN


 IVP


 Nausea & Vomiting  9/5/19 06:45


 10/5/19 06:44   


 


 


 Promethazine HCl/


 Codeine


  (Phenergan with


 Codeine)  5 ml  Q4H  PRN


 ORAL


 For Cough  9/5/19 11:00


 10/5/19 10:59   


 


 


 Vancomycin HCl


  (Vanco rx to


 dose)  1 ea  DAILY  PRN


 MISC


 Per rx protocol  9/5/19 06:45


 10/5/19 06:44   


 


 


 Vancomycin HCl/


 Dextrose  275 ml @ 


 137.5 mls/


 hr  Q24H


 IVPB


   9/7/19 09:00


 9/12/19 08:59   


 











Assessment/Plan


Assessment/Plan:


Abx:


IV Vancomycin 9/5-


Ceftriaxone 9/5-





Assessment:


SOB/wheezing- likely CHF exacerbation- r/o PNA


  -CXR: p


  -CXR (at [place]): increased interstitial markings





L LE edema- 2ry to Cellulitis


   -V. duplex no DVT





GBS bacteremia (at [place])- suspect from cellulitis


  -9/4 Bcx 2/4 GBS





Afebrile


Leukocytosis





MIKE, improving





CHF


HTN


s/p appendectomy


 morbid obesity





Plan:


-Continue empiric IV Vancomycin and Ceftriaxone #2 


-f/u cx


-Monitor CBC/CMP, temperatures


-f/u CXR


-Bcx x2, sp cx


-wound care per hospital protocol





Thank you for this consultation. Will continue to follow along with you.





Discussed with Heather Espinoza M.D. Sep 6, 2019 11:36

## 2019-09-06 NOTE — CARDIAC ELECTROPHYSIOLOGY PN
Subjective


Subjective


[id number]





Objective





Last 24 Hour Vital Signs








  Date Time  Temp Pulse Resp B/P (MAP) Pulse Ox O2 Delivery O2 Flow Rate FiO2


 


9/6/19 12:20  72      


 


9/6/19 11:45 98.4 74 18 111/58 (75) 96   


 


9/6/19 09:00      Nasal Cannula 2.0 


 


9/6/19 08:45  73  144/69    


 


9/6/19 08:00 97.7 73 18 144/69 (94) 96   


 


9/6/19 04:00  69      


 


9/6/19 04:00 97.7 69 19 117/75 (89) 99   


 


9/6/19 00:00 97.0 68 20 118/51 (73) 96   


 


9/5/19 21:00      Nasal Cannula 2.0 


 


9/5/19 20:38  75  124/56    


 


9/5/19 20:00  71      


 


9/5/19 20:00 98.2 71 20 124/56 (78) 98   


 


9/5/19 16:00 99.5 69 20 116/51 (72) 98   


 


9/5/19 16:00  65      

















Intake and Output  


 


 9/5/19 9/6/19





 18:59 06:59


 


Intake Total 120 ml 200 ml


 


Output Total 800 ml 1500 ml


 


Balance -680 ml -1300 ml


 


  


 


Intake Oral 120 ml 200 ml


 


Output Urine Total 800 ml 1500 ml


 


# Bowel Movements 1 1











Laboratory Tests








Test


  9/6/19


05:19


 


White Blood Count


  13.0 K/UL


(4.8-10.8)  H


 


Red Blood Count


  3.88 M/UL


(4.20-5.40)  L


 


Hemoglobin


  11.0 G/DL


(12.0-16.0)  L


 


Hematocrit


  33.8 %


(37.0-47.0)  L


 


Mean Corpuscular Volume 87 FL (80-99)  


 


Mean Corpuscular Hemoglobin


  28.4 PG


(27.0-31.0)


 


Mean Corpuscular Hemoglobin


Concent 32.7 G/DL


(32.0-36.0)


 


Red Cell Distribution Width


  12.6 %


(11.6-14.8)


 


Platelet Count


  186 K/UL


(150-450)


 


Mean Platelet Volume


  6.8 FL


(6.5-10.1)


 


Neutrophils (%) (Auto)


  % (45.0-75.0)


 


 


Lymphocytes (%) (Auto)


  % (20.0-45.0)


 


 


Monocytes (%) (Auto)  % (1.0-10.0)  


 


Eosinophils (%) (Auto)  % (0.0-3.0)  


 


Basophils (%) (Auto)  % (0.0-2.0)  


 


Differential Total Cells


Counted 100  


 


 


Neutrophils % (Manual) 77 % (45-75)  H


 


Lymphocytes % (Manual) 12 % (20-45)  L


 


Monocytes % (Manual) 7 % (1-10)  


 


Eosinophils % (Manual) 4 % (0-3)  H


 


Basophils % (Manual) 0 % (0-2)  


 


Band Neutrophils 0 % (0-8)  


 


Other Cell Type


  Pathologist


review


 


Platelet Estimate Adequate  


 


Platelet Morphology Normal  


 


Red Blood Cell Morphology Normal  


 


Erythrocyte Sedimentation Rate


  74 MM/HR


(0-30)  H


 


Reticulocyte Count


  0.3 %


(0.5-2.0)  L


 


Prothrombin Time


  10.6 SEC


(9.30-11.50)


 


Prothromb Time International


Ratio 1.0 (0.9-1.1)  


 


 


Activated Partial


Thromboplast Time 35 SEC (23-33)


H


 


Sodium Level


  145 MMOL/L


(136-145)


 


Potassium Level


  4.2 MMOL/L


(3.5-5.1)


 


Chloride Level


  108 MMOL/L


()  H


 


Carbon Dioxide Level


  27 MMOL/L


(21-32)


 


Anion Gap


  10 mmol/L


(5-15)


 


Blood Urea Nitrogen


  32 mg/dL


(7-18)  H


 


Creatinine


  1.3 MG/DL


(0.55-1.30)


 


Estimat Glomerular Filtration


Rate  mL/min (>60)  


 


 


Glucose Level


  111 MG/DL


()  H


 


Calcium Level


  8.7 MG/DL


(8.5-10.1)


 


Phosphorus Level


  2.4 MG/DL


(2.5-4.9)  L


 


Magnesium Level


  1.7 MG/DL


(1.8-2.4)  L


 


Iron Level


  17 ug/dL


()  L


 


Total Iron Binding Capacity


  153 ug/dL


(250-450)  L


 


Percent Iron Saturation 11 % (15-50)  L


 


Unsaturated Iron Binding


  136 ug/dL


(112-346)


 


Lactate Dehydrogenase


  242 U/L


()  H


 


Troponin I


  0.000 ng/mL


(0.000-0.056)


 


C-Reactive Protein,


Quantitative 35.4 mg/dL


(0.00-0.90)  H


 


Pro-B-Type Natriuretic Peptide


  2535 pg/mL


(0-125)  H


 


Carcinoembryonic Antigen Pending  


 


Vitamin B12 Level


  728 PG/ML


(193-986)


 


Folate


  4.0 NG/ML


(8.6-58.9)  L


 


Random Vancomycin Level 14.4 ug/mL  











Microbiology








 Date/Time


Source Procedure


Growth Status


 


 


 9/5/19 14:25


Stool Clostridium difficile Toxin Assay - Final Complete

















Francis Jacobson MD Sep 6, 2019 14:38

## 2019-09-06 NOTE — NUR
PT EVALUATION NOTE

Patient seen for initial evaluation, see complete evaluation for details. Patient presents 
with generalized weakness and pain which affects patient's ability to perform mobility 
tasks. Patient requires max assist for rolling; declined sitting at EOB 

and OOB activities due to low back pain. Patient will benefit from skilled inpatient PT 
intervention to address strength, balance, safety and functional mobility. Recommend 
discharge to SNF for short term rehab once medically cleared by MD as patient lives alone. 
Patient has 4 wheeled walker at home.

-------------------------------------------------------------------------------

Addendum: 09/06/19 at 1429 by YEVGENIY JAMIL PT

-------------------------------------------------------------------------------

Amended: Links added.

## 2019-09-06 NOTE — CARDIOLOGY REPORT
--------------- APPROVED REPORT --------------





EXAM: Two-dimensional and M-mode echocardiogram with Doppler and color Doppler.



INDICATION

Congestive Heart Failure 



M-Mode DIMENSIONS 

IVSd0.8 (0.7-1.1cm)Left Atrium (MM)3.9 (1.6-4.0cm)

LVDd5.2 (3.5-5.6cm)Aortic Root2.6 (2.0-3.7cm)

PWd0.9 (0.7-1.1cm)Aortic Cusp Exc.1.6 (1.5-2.0cm)

IVSs1.5 cm

LVDs3.3 (2.5-4.0cm)

PWs1.7 cm





Normal left ventricular chamber size, systolic function and wall motion .

Left ventricular ejection fraction estimated to be 55 %.

No  left ventricular hypertrophy.

 Anterior Echo-free space, may be due to pericardial fat or effusion.

All other cardiac chamber sizes are within normal limits. 

Thickened mitral valve leaflets with normal excursion.

Focal aortic valve sclerosis with adequate cusp excursion.

Mitral annulus and aortic root calcification.

Normal pulmonic valve structure.

Normal tricuspid valve structure. 

IVC at  normal size with physiologic collapse.



A  color flow and spectral Doppler study was performed and revealed:

Trace aortic insufficiency.

Trace  mitral regurgitation.

Mitral diastolic velocities suggest reduced left ventricular relaxation c/w mild LV 

diastolic dysfunction (Grade I )

Mild tricuspid regurgitation.

Tricuspid  systolic velocities suggests peak right ventricular systolic pressure of 38 

mmHg,consistent with mild pulmonary hypertension.

## 2019-09-06 NOTE — NUR
NURSE NOTES:

Received report from Ismael WOLFF. Pt is awake and resting in bed. In no acute distress. IV line 
intact and patent. on 2LPm O2. On cardiac monitor. Bed in lowest position, call light within 
reach. Will continue plan of care.

## 2019-09-06 NOTE — PULMONOLOGY PROGRESS NOTE
Assessment/Plan


Problems:  


(1) Acute on chronic diastolic congestive heart failure


(2) Acute bronchitis


(3) Cellulitis


(4) Cardiomegaly


(5) Mild pulmonary hypertension


(6) Peripheral edema


(7) Morbid obesity


(8) History of hypertension


Assessment/Plan


wbc still high


dyspnea at times, add duoneb


continue abx


echo reviewed, EF of 55%, mild diastolic dysfunction with pulmonary hypertension


decrease lasix. BNP noted


dvt prophylaxis





pt will need NH placement.





Subjective


ROS Limited/Unobtainable:  No


Constitutional:  Reports: no symptoms


HEENT:  Repors: no symptoms


Respiratory:  Reports: no symptoms


Allergies:  


Coded Allergies:  


     No Known Allergies (Unverified , 9/5/19)





Objective





Last 24 Hour Vital Signs








  Date Time  Temp Pulse Resp B/P (MAP) Pulse Ox O2 Delivery O2 Flow Rate FiO2


 


9/6/19 09:00      Nasal Cannula 2.0 


 


9/6/19 08:45  73  144/69    


 


9/6/19 08:00 97.7 73 18 144/69 (94) 96   


 


9/6/19 04:00  69      


 


9/6/19 04:00 97.7 69 19 117/75 (89) 99   


 


9/6/19 00:00 97.0 68 20 118/51 (73) 96   


 


9/5/19 21:00      Nasal Cannula 2.0 


 


9/5/19 20:38  75  124/56    


 


9/5/19 20:00  71      


 


9/5/19 20:00 98.2 71 20 124/56 (78) 98   


 


9/5/19 16:00 99.5 69 20 116/51 (72) 98   


 


9/5/19 16:00  65      


 


9/5/19 12:00 97.9 58 25 131/63 (85) 96   


 


9/5/19 12:00  70      

















Intake and Output  


 


 9/5/19 9/6/19





 18:59 06:59


 


Intake Total 120 ml 200 ml


 


Output Total 800 ml 1500 ml


 


Balance -680 ml -1300 ml


 


  


 


Intake Oral 120 ml 200 ml


 


Output Urine Total 800 ml 1500 ml


 


# Bowel Movements 1 1








Objective


General Appearance:  WD/WN, morbidly obese


Lines, tubes and drains:  peripheral


HEENT:  normocephalic, atraumatic


Neck:  non-tender, normal alignment


Respiratory/Chest:  chest wall non-tender, lungs clear


Breasts:  no masses


Cardiovascular/Chest:  normal peripheral pulses


Abdomen:  normal bowel sounds, non tender


Genitourinary/Rectal:  normal genital exam


Extremities:  normal range of motion, severe edema


Skin Exam:  other - hyperpigmentation in lower extremities





Microbiology








 Date/Time


Source Procedure


Growth Status


 


 


 9/5/19 14:25


Stool Clostridium difficile Toxin Assay - Final Complete








Laboratory Tests


9/5/19 11:43: 


Urine Color Pale yellow, Urine Appearance Clear, Urine pH 5, Urine Specific 

Gravity 1.005, Urine Protein Negative, Urine Glucose (UA) Negative, Urine 

Ketones Negative, Urine Blood Negative, Urine Nitrite Negative, Urine Bilirubin 

Negative, Urine Urobilinogen Normal, Urine Leukocyte Esterase Negative, Urine 

RBC 0, Urine WBC 0, Urine Squamous Epithelial Cells Occasional, Urine Bacteria 

Occasional, Urine Eosinophils None seen, Urine Random Sodium 105, Urine 

Potassium Timed 10L


9/5/19 14:25: Stool Occult Blood Negative


9/6/19 05:19: 


White Blood Count 13.0H, Red Blood Count 3.88L, Hemoglobin 11.0L, Hematocrit 

33.8L, Mean Corpuscular Volume 87, Mean Corpuscular Hemoglobin 28.4, Mean 

Corpuscular Hemoglobin Concent 32.7, Red Cell Distribution Width 12.6, Platelet 

Count 186, Mean Platelet Volume 6.8, Neutrophils (%) (Auto) , Lymphocytes (%) (

Auto) , Monocytes (%) (Auto) , Eosinophils (%) (Auto) , Basophils (%) (Auto) , 

Differential Total Cells Counted 100, Neutrophils % (Manual) 77H, Lymphocytes % 

(Manual) 12L, Monocytes % (Manual) 7, Eosinophils % (Manual) 4H, Basophils % (

Manual) 0, Band Neutrophils 0, Platelet Estimate Adequate, Platelet Morphology 

Normal, Red Blood Cell Morphology Normal, Erythrocyte Sedimentation Rate 74H, 

Reticulocyte Count 0.3L, Prothrombin Time 10.6, Prothromb Time International 

Ratio 1.0, Activated Partial Thromboplast Time 35H, Sodium Level 145, Potassium 

Level 4.2, Chloride Level 108H, Carbon Dioxide Level 27, Anion Gap 10, Blood 

Urea Nitrogen 32H, Creatinine 1.3, Estimat Glomerular Filtration Rate , Glucose 

Level 111H, Calcium Level 8.7, Phosphorus Level 2.4L, Magnesium Level 1.7L, 

Iron Level 17L, Total Iron Binding Capacity 153L, Percent Iron Saturation 11L, 

Unsaturated Iron Binding 136, Lactate Dehydrogenase 242H, Troponin I 0.000, C-

Reactive Protein, Quantitative 35.4H, Pro-B-Type Natriuretic Peptide 2535H, 

Carcinoembryonic Antigen [Pending], Vitamin B12 Level 728, Folate 4.0L, Random 

Vancomycin Level 14.4





Current Medications








 Medications


  (Trade)  Dose


 Ordered  Sig/Imelda


 Route


 PRN Reason  Start Time


 Stop Time Status Last Admin


Dose Admin


 


 Acetaminophen


  (Tylenol)  650 mg  Q6H  PRN


 ORAL


 Mild Pain/Temp > 100.5  9/5/19 06:45


 10/5/19 06:44  9/6/19 03:01


 


 


 Acetaminophen/


 Hydrocodone Bitart


  (Norco 5/325)  1 tab  EVERY 4 HOURS  PRN


 ORAL


 Moderate Pain (Pain Scale 4-6)  9/6/19 11:15


 9/13/19 03:29 UNV  


 


 


 Albuterol/


 Ipratropium


  (Albuterol/


 Ipratropium)  3 ml  Q4H  PRN


 HHN


 Shortness of Breath  9/6/19 11:15


 9/11/19 11:14 UNV  


 


 


 Aspirin


  (Ecotrin)  81 mg  DAILY


 ORAL


   9/5/19 09:00


 10/5/19 08:59  9/6/19 08:45


 


 


 Carvedilol


  (Coreg)  12.5 mg  EVERY 12  HOURS


 ORAL


   9/5/19 09:00


 10/5/19 08:59  9/6/19 08:45


 


 


 Ceftriaxone


 Sodium 1 gm/


 Dextrose  55 ml @ 


 110 mls/hr  DAILY


 IVPB


   9/5/19 09:00


 9/12/19 08:59  9/6/19 08:47


 


 


 Furosemide


  (Lasix)  40 mg  EVERY 12  HOURS


 IV


   9/5/19 09:00


 10/5/19 08:59  9/6/19 08:46


 


 


 Heparin Sodium


  (Porcine)


  (Heparin 5000


 units/ml)  5,000 units  EVERY 12  HOURS


 SUBQ


   9/5/19 09:00


 10/5/19 08:59  9/6/19 08:46


 


 


 Ondansetron HCl


  (Zofran)  4 mg  Q4H  PRN


 IVP


 Nausea & Vomiting  9/5/19 06:45


 10/5/19 06:44   


 


 


 Promethazine HCl/


 Codeine


  (Phenergan with


 Codeine)  5 ml  Q4H  PRN


 ORAL


 For Cough  9/5/19 11:00


 10/5/19 10:59   


 


 


 Vancomycin HCl


  (Vanco rx to


 dose)  1 ea  DAILY  PRN


 MISC


 Per rx protocol  9/5/19 06:45


 10/5/19 06:44   


 


 


 Vancomycin HCl/


 Dextrose  275 ml @ 


 137.5 mls/


 hr  Q24H


 IVPB


   9/7/19 09:00


 9/12/19 08:59   


 

















Aleisha Lagunas MD Sep 6, 2019 11:19

## 2019-09-06 NOTE — NUR
NURSE NOTES:

Received report from Min RN, pt. in bed awake, family at bedside, pt. is A/o x's4- able to 
make needs known, no signs or symptoms of acute cardiac or respiratory distress noted, bed 
in lowest position and call light within easy reach, bed alarm on, side rails up x's3 and 
safety brakes engaged, pt. appears to be sating well on 2L NC at 97%- no distress noted, 
Bardales intact and draining to gravity, RFA 22G IV intact and patent. safety measures 
continued. will continue with plan of care.

## 2019-09-07 VITALS — DIASTOLIC BLOOD PRESSURE: 63 MMHG | SYSTOLIC BLOOD PRESSURE: 131 MMHG

## 2019-09-07 VITALS — SYSTOLIC BLOOD PRESSURE: 110 MMHG | DIASTOLIC BLOOD PRESSURE: 67 MMHG

## 2019-09-07 VITALS — SYSTOLIC BLOOD PRESSURE: 115 MMHG | DIASTOLIC BLOOD PRESSURE: 65 MMHG

## 2019-09-07 VITALS — DIASTOLIC BLOOD PRESSURE: 72 MMHG | SYSTOLIC BLOOD PRESSURE: 126 MMHG

## 2019-09-07 VITALS — DIASTOLIC BLOOD PRESSURE: 66 MMHG | SYSTOLIC BLOOD PRESSURE: 136 MMHG

## 2019-09-07 VITALS — DIASTOLIC BLOOD PRESSURE: 83 MMHG | SYSTOLIC BLOOD PRESSURE: 132 MMHG

## 2019-09-07 LAB
ANION GAP SERPL CALC-SCNC: 11 MMOL/L (ref 5–15)
BUN SERPL-MCNC: 21 MG/DL (ref 7–18)
CALCIUM SERPL-MCNC: 9.4 MG/DL (ref 8.5–10.1)
CHLORIDE SERPL-SCNC: 104 MMOL/L (ref 98–107)
CO2 SERPL-SCNC: 29 MMOL/L (ref 21–32)
CREAT SERPL-MCNC: 1.1 MG/DL (ref 0.55–1.3)
PHOSPHATE SERPL-MCNC: 2.8 MG/DL (ref 2.5–4.9)
POTASSIUM SERPL-SCNC: 3.7 MMOL/L (ref 3.5–5.1)
SODIUM SERPL-SCNC: 144 MMOL/L (ref 136–145)

## 2019-09-07 RX ADMIN — HEPARIN SODIUM SCH UNITS: 5000 INJECTION INTRAVENOUS; SUBCUTANEOUS at 20:48

## 2019-09-07 RX ADMIN — HEPARIN SODIUM SCH UNITS: 5000 INJECTION INTRAVENOUS; SUBCUTANEOUS at 08:59

## 2019-09-07 RX ADMIN — Medication SCH MLS/HR: at 08:59

## 2019-09-07 RX ADMIN — SODIUM CHLORIDE SCH MLS/HR: 0.9 INJECTION INTRAVENOUS at 08:59

## 2019-09-07 RX ADMIN — ASPIRIN SCH MG: 81 TABLET, DELAYED RELEASE ORAL at 08:57

## 2019-09-07 RX ADMIN — CARVEDILOL SCH MG: 12.5 TABLET, FILM COATED ORAL at 20:49

## 2019-09-07 RX ADMIN — CARVEDILOL SCH MG: 12.5 TABLET, FILM COATED ORAL at 08:57

## 2019-09-07 RX ADMIN — HYDROCODONE BITARTRATE AND ACETAMINOPHEN PRN TAB: 5; 325 TABLET ORAL at 06:54

## 2019-09-07 NOTE — NUR
Received pt from MARIELLA Ortega. Pt is awake and resting in bed. Nasal cannula 2L on patient. Iv 
site intact. Bed locked in lowest position, call light within reach. Will continue with plan 
of care.

## 2019-09-07 NOTE — INTERNAL MED PROGRESS NOTE
Subjective


Date of Service:  Sep 7, 2019


Physician Name


Sixto Hardy


Attending Physician


Chris Yarbrough MD





Current Medications








 Medications


  (Trade)  Dose


 Ordered  Sig/Imelda


 Route


 PRN Reason  Start Time


 Stop Time Status Last Admin


Dose Admin


 


 Acetaminophen


  (Tylenol)  650 mg  Q6H  PRN


 ORAL


 Mild Pain/Temp > 100.5  9/5/19 06:45


 10/5/19 06:44  9/6/19 03:01


 


 


 Acetaminophen/


 Hydrocodone Bitart


  (Norco 5/325)  1 tab  Q4H  PRN


 ORAL


 Moderate Pain (Pain Scale 4-6)  9/6/19 11:15


 9/13/19 11:14  9/7/19 06:54


 


 


 Albuterol/


 Ipratropium


  (Albuterol/


 Ipratropium)  3 ml  Q4H  PRN


 HHN


 Shortness of Breath  9/6/19 11:15


 9/11/19 11:14   


 


 


 Aspirin


  (Ecotrin)  81 mg  DAILY


 ORAL


   9/5/19 09:00


 10/5/19 08:59  9/7/19 08:57


 


 


 Carvedilol


  (Coreg)  12.5 mg  EVERY 12  HOURS


 ORAL


   9/5/19 09:00


 10/5/19 08:59  9/7/19 08:57


 


 


 Ceftriaxone


 Sodium 1 gm/


 Dextrose  55 ml @ 


 110 mls/hr  DAILY


 IVPB


   9/5/19 09:00


 9/12/19 08:59  9/7/19 08:59


 


 


 Furosemide


  (Lasix)  40 mg  EVERY 12  HOURS


 IV


   9/6/19 21:00


 10/5/19 08:59  9/7/19 08:57


 


 


 Heparin Sodium


  (Porcine)


  (Heparin 5000


 units/ml)  5,000 units  EVERY 12  HOURS


 SUBQ


   9/5/19 09:00


 10/5/19 08:59  9/7/19 08:59


 


 


 Ondansetron HCl


  (Zofran)  4 mg  Q4H  PRN


 IVP


 Nausea & Vomiting  9/5/19 06:45


 10/5/19 06:44   


 


 


 Promethazine HCl/


 Codeine


  (Phenergan with


 Codeine)  5 ml  Q4H  PRN


 ORAL


 For Cough  9/5/19 11:00


 10/5/19 10:59   


 


 


 Vancomycin HCl


  (Vanco rx to


 dose)  1 ea  DAILY  PRN


 MISC


 Per rx protocol  9/5/19 06:45


 10/5/19 06:44   


 


 


 Vancomycin HCl/


 Dextrose  275 ml @ 


 137.5 mls/


 hr  Q24H


 IVPB


   9/7/19 09:00


 9/12/19 08:59  9/7/19 08:59


 








Allergies:  


Coded Allergies:  


     No Known Allergies (Unverified , 9/5/19)


ROS Limited/Unobtainable:  No


Constitutional:  Reports: no symptoms


HEENT:  Reports: no symptoms


Cardiovascular:  Reports: no symptoms


Respiratory:  Reports: shortness of breath


Gastrointestinal/Abdominal:  Reports: no symptoms


Genitourinary:  Reports: no symptoms


Neurologic/Psychiatric:  Reports: no symptoms


Subjective


83 YO F admitted with shortness of breath.  Now congestive heart failure.  

Cover for Int Med-Dr Yarbrough.





Objective





Last Vital Signs








  Date Time  Temp Pulse Resp B/P (MAP) Pulse Ox O2 Delivery O2 Flow Rate FiO2


 


9/7/19 12:00 98.5 78 18 132/83 (99) 96   


 


9/7/19 09:00      Nasal Cannula 2.0 


 


9/7/19 07:48        28











Laboratory Tests








Test


  9/7/19


05:58


 


Sodium Level


  144 MMOL/L


(136-145)


 


Potassium Level


  3.7 MMOL/L


(3.5-5.1)


 


Chloride Level


  104 MMOL/L


()


 


Carbon Dioxide Level


  29 MMOL/L


(21-32)


 


Anion Gap


  11 mmol/L


(5-15)


 


Blood Urea Nitrogen


  21 mg/dL


(7-18)  H


 


Creatinine


  1.1 MG/DL


(0.55-1.30)


 


Estimat Glomerular Filtration


Rate  mL/min (>60)  


 


 


Glucose Level


  103 MG/DL


()


 


Calcium Level


  9.4 MG/DL


(8.5-10.1)


 


Phosphorus Level


  2.8 MG/DL


(2.5-4.9)


 


Magnesium Level


  1.7 MG/DL


(1.8-2.4)  L


 


Troponin I


  0.000 ng/mL


(0.000-0.056)


 


Pro-B-Type Natriuretic Peptide


  1107 pg/mL


(0-125)  H











Microbiology








 Date/Time


Source Procedure


Growth Status


 


 


 9/5/19 14:25


Stool Clostridium difficile Toxin Assay - Final Complete

















Intake and Output  


 


 9/6/19 9/7/19





 18:59 06:59


 


Intake Total 390 ml 130 ml


 


Output Total 1750 ml 1300 ml


 


Balance -1360 ml -1170 ml


 


  


 


Intake Oral 390 ml 130 ml


 


Output Urine Total 1750 ml 1300 ml








Objective


PHYSICAL EXAMINATION:


GENERAL:  The patient is a well-developed and well-nourished 


American female, in no apparent distress.


HEENT:  Eyes, pupils are equal and responsive to light and accommodation.


Extraocular movements are intact.


NECK:  Supple without lymphadenopathy.


CHEST:  Lungs are clear to auscultation bilaterally without wheezes or


rales.


CARDIOVASCULAR:  Regular rhythm and rate.  S1 and S2 are normal without


murmurs, rubs, or gallops.


ABDOMEN:  Soft, nontender, and nondistended.  Positive bowel sounds.  No


evidence of hepatosplenomegaly.  Currently, no rebound or guarding


noted.


EXTREMITIES:  Negative for clubbing, cyanosis, or edema.


NEUROLOGICAL:  Cranial nerves II through XII are grossly intact without


focal deficits.  Motor strength is 5/5 bilaterally.  Deep tendon reflexes


are 2+ plantar.





Assessment/Plan


Assessment/Plan


ASSESSMENT:  This is an 84-year-old  female.





1. Shortness of breath.


2. Wheezing.


3. Edema of the bilateral lower extremities.


4. Diastolic Congestive heart failure-acute on chronic


5. Hypertension.


6.  Cellulitis left leg


7.  Bacteremia-Group B Strep





TREATMENT:


1. Shortness of breath/wheezing/congestive heart failure.  


Cardiology consultation has been obtained with Dr. Jacobson.  Echocardiogram LVEF=

55%.  


We will follow recommendation of Cardiology.


2. Hypertension.  


Continue chlorthalidone, losartan, and carvedilol as above.


3.  Continue Vanco and ceftriaxone per ID-Sitxo Monk MD Sep 7, 2019 13:28

## 2019-09-07 NOTE — CONSULTATION
DATE OF CONSULTATION:  09/06/2019

CARDIOLOGY CONSULTATION



CONSULTING PHYSICIAN:  Francis Jacobson M.D.



REFERRING PHYSICIAN:  Chris Yarbrough M.D.



REASON FOR CONSULTATION:  _____ hypertension.



HISTORY OF PRESENT ILLNESS:  The patient is an 84-year-old 

lady with history of hypertension and peripheral edema, presented to

[facility] complaining of shortness of breath.  The patient usually

lives at home alone.  The patient felt more short of breath and swelling

of the legs was increased.  The patient was evaluated and was then

transferred to [facility] as the patient is a non-[facility]

member.



REVIEW OF SYSTEMS:  Negative other than what was mentioned in history of

present illness.



PAST MEDICAL HISTORY:  As mentioned above.



PAST SURGICAL HISTORY:  Appendectomy.



MEDICATIONS:  Include aspirin, Coreg, chlorthalidone, Lasix, losartan,

ibuprofen, and calcium carbonate.



ALLERGIES:  She has no known drug allergies.



FAMILY HISTORY:  Noncontributory.



PHYSICAL EXAMINATION:

VITAL SIGNS:  Blood pressure 115/58, pulse 74, respirations 18, and

temperature 98.4.

HEAD AND NECK:  Showed no JVD.

LUNGS:  Decreased breath sounds.

CARDIOVASCULAR:  Regular, S1 and S2 with no gallop or murmur.

ABDOMEN:  Soft.

EXTREMITIES:  Bilateral 2+ pitting edema.



LABORATORY AND DIAGNOSTIC DATA:  Her labs show white count 13, hemoglobin

11, hematocrit 33.8, and platelet count 186,000.  Sodium 141, potassium

4.2, BUN 32, creatinine 1.3, and glucose 111.  Phosphorus is 2.5.

Magnesium is 1.7.  Troponin is negative.  BNP 2135.



ASSESSMENT AND PLAN:

1. Worsening of the lower extremity edema.  Her echocardiogram shows EF

of 55%, so this is likely due to diastolic dysfunction as well as

lymphedema, as echo showed mild left-sided dysfunction.  We will start the

patient on Lasix 40 mg IV b.i.d. and watch the patient clinically.

2. Hypertension.  Continue Coreg 12.5 mg b.i.d., Lasix, and add p.r.n.

clonidine to her medical regimen.

3. Questionable pneumonia.  The patient already was seen by ID, started

on IV antibiotic.

4. Lower extremity cellulitis.  Venous duplex showed no evidence of DVT.

The patient has had gram-positive bacteremia at [facility], suspect from

cellulitis, _____ on IV antibiotic.

5. Morbid obesity.



Thank you very much for allowing me to participate in the care of this

patient.  Please do not hesitate to contact me for any questions regarding

my evaluation.









  ______________________________________________

  Francis Jacobson M.D.





DR:  Juanita

D:  09/06/2019 14:40

T:  09/07/2019 01:14

JOB#:  [id number]

CC:

## 2019-09-07 NOTE — PULMONOLOGY PROGRESS NOTE
Assessment/Plan


Problems:  


(1) Acute on chronic diastolic congestive heart failure


(2) Acute bronchitis


(3) Cellulitis


(4) Cardiomegaly


(5) Mild pulmonary hypertension


(6) Peripheral edema


(7) Morbid obesity


(8) History of hypertension


Assessment/Plan


wbc was  high, todays labs pending


no dyspnea ,on duoneb


continue abx


echo reviewed, EF of 55%, mild diastolic dysfunction with pulmonary hypertension


decrease lasix. BNP noted


dvt prophylaxis





pt will need NH placement.





Subjective


ROS Limited/Unobtainable:  No


Constitutional:  Reports: no symptoms


HEENT:  Repors: no symptoms


Respiratory:  Reports: no symptoms


Allergies:  


Coded Allergies:  


     No Known Allergies (Unverified , 9/5/19)





Objective





Last 24 Hour Vital Signs








  Date Time  Temp Pulse Resp B/P (MAP) Pulse Ox O2 Delivery O2 Flow Rate FiO2


 


9/7/19 04:00 97.8 98 18 136/66 (89) 98   


 


9/7/19 04:00  80      


 


9/7/19 00:00  77      


 


9/7/19 00:00 98.8 81 19 131/63 (85) 96   


 


9/6/19 21:00      Nasal Cannula 2.0 


 


9/6/19 20:55     95 Nasal Cannula 2.0 28


 


9/6/19 20:54  89  129/71    


 


9/6/19 20:23 97.9       


 


9/6/19 20:00  85      


 


9/6/19 20:00 98.6 84 18 129/71 (90) 97   


 


9/6/19 16:00  79      


 


9/6/19 16:00 97.9 85 20 109/70 (83) 94   


 


9/6/19 12:20  72      


 


9/6/19 11:45 98.4 74 18 111/58 (75) 96   


 


9/6/19 09:00      Nasal Cannula 2.0 


 


9/6/19 08:45  73  144/69    


 


9/6/19 08:00 97.7 73 18 144/69 (94) 96   

















Intake and Output  


 


 9/6/19 9/7/19





 19:00 07:00


 


Intake Total 390 ml 130 ml


 


Output Total 1750 ml 1300 ml


 


Balance -1360 ml -1170 ml


 


  


 


Intake Oral 390 ml 130 ml


 


Output Urine Total 1750 ml 1300 ml








Objective


General Appearance:  WD/WN, morbidly obese


Lines, tubes and drains:  peripheral


HEENT:  normocephalic, atraumatic


Neck:  non-tender, normal alignment


Respiratory/Chest:  chest wall non-tender, lungs clear


Breasts:  no masses


Cardiovascular/Chest:  normal peripheral pulses


Abdomen:  normal bowel sounds, non tender


Genitourinary/Rectal:  normal genital exam


Extremities:  normal range of motion, severe edema


Skin Exam:  other - hyperpigmentation in lower extremities





Microbiology








 Date/Time


Source Procedure


Growth Status


 


 


 9/5/19 14:25


Stool Clostridium difficile Toxin Assay - Final Complete











Current Medications








 Medications


  (Trade)  Dose


 Ordered  Sig/Mielda


 Route


 PRN Reason  Start Time


 Stop Time Status Last Admin


Dose Admin


 


 Acetaminophen


  (Tylenol)  650 mg  Q6H  PRN


 ORAL


 Mild Pain/Temp > 100.5  9/5/19 06:45


 10/5/19 06:44  9/6/19 03:01


 


 


 Acetaminophen/


 Hydrocodone Bitart


  (Norco 5/325)  1 tab  Q4H  PRN


 ORAL


 Moderate Pain (Pain Scale 4-6)  9/6/19 11:15


 9/13/19 11:14  9/6/19 19:53


 


 


 Albuterol/


 Ipratropium


  (Albuterol/


 Ipratropium)  3 ml  Q4H  PRN


 HHN


 Shortness of Breath  9/6/19 11:15


 9/11/19 11:14   


 


 


 Aspirin


  (Ecotrin)  81 mg  DAILY


 ORAL


   9/5/19 09:00


 10/5/19 08:59  9/6/19 08:45


 


 


 Carvedilol


  (Coreg)  12.5 mg  EVERY 12  HOURS


 ORAL


   9/5/19 09:00


 10/5/19 08:59  9/6/19 20:54


 


 


 Ceftriaxone


 Sodium 1 gm/


 Dextrose  55 ml @ 


 110 mls/hr  DAILY


 IVPB


   9/5/19 09:00


 9/12/19 08:59  9/6/19 08:47


 


 


 Furosemide


  (Lasix)  40 mg  EVERY 12  HOURS


 IV


   9/6/19 21:00


 10/5/19 08:59  9/6/19 20:54


 


 


 Heparin Sodium


  (Porcine)


  (Heparin 5000


 units/ml)  5,000 units  EVERY 12  HOURS


 SUBQ


   9/5/19 09:00


 10/5/19 08:59  9/6/19 20:54


 


 


 Ondansetron HCl


  (Zofran)  4 mg  Q4H  PRN


 IVP


 Nausea & Vomiting  9/5/19 06:45


 10/5/19 06:44   


 


 


 Promethazine HCl/


 Codeine


  (Phenergan with


 Codeine)  5 ml  Q4H  PRN


 ORAL


 For Cough  9/5/19 11:00


 10/5/19 10:59   


 


 


 Vancomycin HCl


  (Vanco rx to


 dose)  1 ea  DAILY  PRN


 MISC


 Per rx protocol  9/5/19 06:45


 10/5/19 06:44   


 


 


 Vancomycin HCl/


 Dextrose  275 ml @ 


 137.5 mls/


 hr  Q24H


 IVPB


   9/7/19 09:00


 9/12/19 08:59   


 

















Aleisha Lagunas MD Sep 7, 2019 06:54

## 2019-09-07 NOTE — INFECTIOUS DISEASES PROG NOTE
Assessment/Plan


Assessment/Plan





Abx:


IV Vancomycin 9/5-


Ceftriaxone 9/5-





Assessment:


SOB/wheezing- likely CHF exacerbation- r/o PNA


  -CXR: p


  -CXR (at [facility]): increased interstitial markings





L LE edema- 2ry to Cellulitis


   -V. duplex no DVT





GBS bacteremia (at [facility])- suspect from cellulitis


  -9/4 Bcx 2/4 GBS





Afebrile


Leukocytosis





MIKE, improving





CHF


HTN


s/p appendectomy


 morbid obesity





Plan:


-Continue empiric IV Vancomycin and Ceftriaxone #3 


-f/u cx


-Monitor CBC/CMP, temperatures


-f/u CXR


-Bcx x2, sp cx


-wound care per hospital protocol





Thank you for this consultation. Will continue to follow along with you.





Subjective


Allergies:  


Coded Allergies:  


     No Known Allergies (Unverified , 9/5/19)


Subjective


Afebrile


Improved SOB now on 2L NC


Leukocytosis 13





Objective


Vital Signs





Last 24 Hour Vital Signs








  Date Time  Temp Pulse Resp B/P (MAP) Pulse Ox O2 Delivery O2 Flow Rate FiO2


 


9/7/19 08:57  83  110/67    


 


9/7/19 08:00 99.1 83 22 110/67 (81) 95   


 


9/7/19 07:48     97 Nasal Cannula 2.0 28


 


9/7/19 04:00 97.8 98 18 136/66 (89) 98   


 


9/7/19 04:00  80      


 


9/7/19 00:00  77      


 


9/7/19 00:00 98.8 81 19 131/63 (85) 96   


 


9/6/19 21:00      Nasal Cannula 2.0 


 


9/6/19 20:55     95 Nasal Cannula 2.0 28


 


9/6/19 20:54  89  129/71    


 


9/6/19 20:23 97.9       


 


9/6/19 20:00  85      


 


9/6/19 20:00 98.6 84 18 129/71 (90) 97   


 


9/6/19 16:00  79      


 


9/6/19 16:00 97.9 85 20 109/70 (83) 94   


 


9/6/19 12:20  72      


 


9/6/19 11:45 98.4 74 18 111/58 (75) 96   








Height (Feet):  5


Height (Inches):  2.00


Weight (Pounds):  230


Objective


General Appearance:  NAD


HEENT:  normocephalic, atraumatic, EOMI


Respiratory/Chest:  CTAB


Cardiovascular: RRR, S1, S2


Abdomen:  normal bowel sounds, non tender





Microbiology








 Date/Time


Source Procedure


Growth Status


 


 


 9/5/19 14:25


Stool Clostridium difficile Toxin Assay - Final Complete











Laboratory Tests








Test


  9/7/19


05:58


 


Sodium Level


  144 MMOL/L


(136-145)


 


Potassium Level


  3.7 MMOL/L


(3.5-5.1)


 


Chloride Level


  104 MMOL/L


()


 


Carbon Dioxide Level


  29 MMOL/L


(21-32)


 


Anion Gap


  11 mmol/L


(5-15)


 


Blood Urea Nitrogen


  21 mg/dL


(7-18)  H


 


Creatinine


  1.1 MG/DL


(0.55-1.30)


 


Estimat Glomerular Filtration


Rate  mL/min (>60)  


 


 


Glucose Level


  103 MG/DL


()


 


Calcium Level


  9.4 MG/DL


(8.5-10.1)


 


Phosphorus Level


  2.8 MG/DL


(2.5-4.9)


 


Magnesium Level


  1.7 MG/DL


(1.8-2.4)  L


 


Troponin I


  0.000 ng/mL


(0.000-0.056)


 


Pro-B-Type Natriuretic Peptide


  1107 pg/mL


(0-125)  H











Current Medications








 Medications


  (Trade)  Dose


 Ordered  Sig/Imelda


 Route


 PRN Reason  Start Time


 Stop Time Status Last Admin


Dose Admin


 


 Acetaminophen


  (Tylenol)  650 mg  Q6H  PRN


 ORAL


 Mild Pain/Temp > 100.5  9/5/19 06:45


 10/5/19 06:44  9/6/19 03:01


 


 


 Acetaminophen/


 Hydrocodone Bitart


  (Norco 5/325)  1 tab  Q4H  PRN


 ORAL


 Moderate Pain (Pain Scale 4-6)  9/6/19 11:15


 9/13/19 11:14  9/7/19 06:54


 


 


 Albuterol/


 Ipratropium


  (Albuterol/


 Ipratropium)  3 ml  Q4H  PRN


 HHN


 Shortness of Breath  9/6/19 11:15


 9/11/19 11:14   


 


 


 Aspirin


  (Ecotrin)  81 mg  DAILY


 ORAL


   9/5/19 09:00


 10/5/19 08:59  9/7/19 08:57


 


 


 Carvedilol


  (Coreg)  12.5 mg  EVERY 12  HOURS


 ORAL


   9/5/19 09:00


 10/5/19 08:59  9/7/19 08:57


 


 


 Ceftriaxone


 Sodium 1 gm/


 Dextrose  55 ml @ 


 110 mls/hr  DAILY


 IVPB


   9/5/19 09:00


 9/12/19 08:59  9/7/19 08:59


 


 


 Furosemide


  (Lasix)  40 mg  EVERY 12  HOURS


 IV


   9/6/19 21:00


 10/5/19 08:59  9/7/19 08:57


 


 


 Heparin Sodium


  (Porcine)


  (Heparin 5000


 units/ml)  5,000 units  EVERY 12  HOURS


 SUBQ


   9/5/19 09:00


 10/5/19 08:59  9/7/19 08:59


 


 


 Ondansetron HCl


  (Zofran)  4 mg  Q4H  PRN


 IVP


 Nausea & Vomiting  9/5/19 06:45


 10/5/19 06:44   


 


 


 Promethazine HCl/


 Codeine


  (Phenergan with


 Codeine)  5 ml  Q4H  PRN


 ORAL


 For Cough  9/5/19 11:00


 10/5/19 10:59   


 


 


 Vancomycin HCl


  (Vanco rx to


 dose)  1 ea  DAILY  PRN


 MISC


 Per rx protocol  9/5/19 06:45


 10/5/19 06:44   


 


 


 Vancomycin HCl/


 Dextrose  275 ml @ 


 137.5 mls/


 hr  Q24H


 IVPB


   9/7/19 09:00


 9/12/19 08:59  9/7/19 08:59


 

















Matteo Linares MD Sep 7, 2019 10:24

## 2019-09-07 NOTE — NUR
NURSE NOTES:

Received patient from Jeffery Alvarado. Patient is awake and alert in bed. No complain of pain or 
discomfort. Bed locked to lowest position side rails up X2. call bell within patients reach. 
O2 at 2L/min via nasal cannula. no complain of SOB. Will anticipate needs.

## 2019-09-07 NOTE — CARDIAC ELECTROPHYSIOLOGY PN
Assessment/Plan


Assessment/Plan


1. Worsening of the lower extremity edema.  Her echocardiogram shows EF


of 55%, so this is likely due to diastolic dysfunction as well as


lymphedema, as echo showed mild left-sided dysfunction.  


Continue Lasix 40 mg IV b.i.d. 





2. Hypertension.  Continue Coreg 12.5 mg b.i.d., Lasix, and   p.r.n.


clonidine 


3. Questionable pneumonia.  The patient already was seen by ID, started on IV 

antibiotic.


4. Lower extremity cellulitis.  Venous duplex showed no evidence of DVT.


The patient has had gram-positive bacteremia at [place], suspect from


cellulitis on IV antibiotic.


5. Morbid obesity.





Subjective


Subjective


Alert in NAD. No CP or SOB





Objective





Last 24 Hour Vital Signs








  Date Time  Temp Pulse Resp B/P (MAP) Pulse Ox O2 Delivery O2 Flow Rate FiO2


 


9/7/19 12:00 98.5 78 18 132/83 (99) 96   


 


9/7/19 12:00  73      


 


9/7/19 09:00      Nasal Cannula 2.0 


 


9/7/19 08:57  83  110/67    


 


9/7/19 08:00  91      


 


9/7/19 08:00 99.1 83 22 110/67 (81) 95   


 


9/7/19 07:48     97 Nasal Cannula 2.0 28


 


9/7/19 04:00 97.8 98 18 136/66 (89) 98   


 


9/7/19 04:00  80      


 


9/7/19 00:00  77      


 


9/7/19 00:00 98.8 81 19 131/63 (85) 96   


 


9/6/19 21:00      Nasal Cannula 2.0 


 


9/6/19 20:55     95 Nasal Cannula 2.0 28


 


9/6/19 20:54  89  129/71    


 


9/6/19 20:23 97.9       


 


9/6/19 20:00  85      


 


9/6/19 20:00 98.6 84 18 129/71 (90) 97   


 


9/6/19 16:00  79      


 


9/6/19 16:00 97.9 85 20 109/70 (83) 94   

















Intake and Output  


 


 9/6/19 9/7/19





 18:59 06:59


 


Intake Total 390 ml 130 ml


 


Output Total 1750 ml 1300 ml


 


Balance -1360 ml -1170 ml


 


  


 


Intake Oral 390 ml 130 ml


 


Output Urine Total 1750 ml 1300 ml











Laboratory Tests








Test


  9/7/19


05:58


 


Sodium Level


  144 MMOL/L


(136-145)


 


Potassium Level


  3.7 MMOL/L


(3.5-5.1)


 


Chloride Level


  104 MMOL/L


()


 


Carbon Dioxide Level


  29 MMOL/L


(21-32)


 


Anion Gap


  11 mmol/L


(5-15)


 


Blood Urea Nitrogen


  21 mg/dL


(7-18)  H


 


Creatinine


  1.1 MG/DL


(0.55-1.30)


 


Estimat Glomerular Filtration


Rate  mL/min (>60)  


 


 


Glucose Level


  103 MG/DL


()


 


Calcium Level


  9.4 MG/DL


(8.5-10.1)


 


Phosphorus Level


  2.8 MG/DL


(2.5-4.9)


 


Magnesium Level


  1.7 MG/DL


(1.8-2.4)  L


 


Troponin I


  0.000 ng/mL


(0.000-0.056)


 


Pro-B-Type Natriuretic Peptide


  1107 pg/mL


(0-125)  H











Microbiology








 Date/Time


Source Procedure


Growth Status


 


 


 9/5/19 14:25


Stool Clostridium difficile Toxin Assay - Final Complete








Objective


HEAD AND NECK: No JVD.


LUNGS:  Decreased breath sounds.


CARDIOVASCULAR:  Regular, S1 and S2 with no gallop or murmur.


ABDOMEN:  Soft.


EXTREMITIES:  Bilateral 2+ pitting edema.











Francis Jacobson MD Sep 7, 2019 14:15

## 2019-09-08 VITALS — DIASTOLIC BLOOD PRESSURE: 60 MMHG | SYSTOLIC BLOOD PRESSURE: 119 MMHG

## 2019-09-08 VITALS — SYSTOLIC BLOOD PRESSURE: 128 MMHG | DIASTOLIC BLOOD PRESSURE: 60 MMHG

## 2019-09-08 VITALS — SYSTOLIC BLOOD PRESSURE: 132 MMHG | DIASTOLIC BLOOD PRESSURE: 79 MMHG

## 2019-09-08 VITALS — DIASTOLIC BLOOD PRESSURE: 75 MMHG | SYSTOLIC BLOOD PRESSURE: 133 MMHG

## 2019-09-08 VITALS — DIASTOLIC BLOOD PRESSURE: 62 MMHG | SYSTOLIC BLOOD PRESSURE: 125 MMHG

## 2019-09-08 VITALS — SYSTOLIC BLOOD PRESSURE: 111 MMHG | DIASTOLIC BLOOD PRESSURE: 45 MMHG

## 2019-09-08 LAB
ADD MANUAL DIFF: NO
ANION GAP SERPL CALC-SCNC: 9 MMOL/L (ref 5–15)
BASOPHILS NFR BLD AUTO: 0.8 % (ref 0–2)
BUN SERPL-MCNC: 19 MG/DL (ref 7–18)
CALCIUM SERPL-MCNC: 9.3 MG/DL (ref 8.5–10.1)
CHLORIDE SERPL-SCNC: 101 MMOL/L (ref 98–107)
CO2 SERPL-SCNC: 32 MMOL/L (ref 21–32)
CREAT SERPL-MCNC: 1.1 MG/DL (ref 0.55–1.3)
EOSINOPHIL NFR BLD AUTO: 3.7 % (ref 0–3)
ERYTHROCYTE [DISTWIDTH] IN BLOOD BY AUTOMATED COUNT: 12.4 % (ref 11.6–14.8)
HCT VFR BLD CALC: 37.6 % (ref 37–47)
HGB BLD-MCNC: 12.4 G/DL (ref 12–16)
LYMPHOCYTES NFR BLD AUTO: 15.2 % (ref 20–45)
MCV RBC AUTO: 87 FL (ref 80–99)
MONOCYTES NFR BLD AUTO: 6.7 % (ref 1–10)
NEUTROPHILS NFR BLD AUTO: 73.6 % (ref 45–75)
PLATELET # BLD: 272 K/UL (ref 150–450)
POTASSIUM SERPL-SCNC: 3.4 MMOL/L (ref 3.5–5.1)
RBC # BLD AUTO: 4.33 M/UL (ref 4.2–5.4)
SODIUM SERPL-SCNC: 142 MMOL/L (ref 136–145)
WBC # BLD AUTO: 13.8 K/UL (ref 4.8–10.8)

## 2019-09-08 RX ADMIN — SODIUM CHLORIDE SCH MLS/HR: 0.9 INJECTION INTRAVENOUS at 09:25

## 2019-09-08 RX ADMIN — HEPARIN SODIUM SCH UNITS: 5000 INJECTION INTRAVENOUS; SUBCUTANEOUS at 09:29

## 2019-09-08 RX ADMIN — CARVEDILOL SCH MG: 12.5 TABLET, FILM COATED ORAL at 09:26

## 2019-09-08 RX ADMIN — HEPARIN SODIUM SCH UNITS: 5000 INJECTION INTRAVENOUS; SUBCUTANEOUS at 21:29

## 2019-09-08 RX ADMIN — CARVEDILOL SCH MG: 12.5 TABLET, FILM COATED ORAL at 21:28

## 2019-09-08 RX ADMIN — Medication SCH MLS/HR: at 09:26

## 2019-09-08 RX ADMIN — ASPIRIN SCH MG: 81 TABLET, DELAYED RELEASE ORAL at 09:26

## 2019-09-08 RX ADMIN — HYDROCODONE BITARTRATE AND ACETAMINOPHEN PRN TAB: 5; 325 TABLET ORAL at 16:24

## 2019-09-08 NOTE — INTERNAL MED PROGRESS NOTE
Subjective


Date of Service:  Sep 8, 2019


Physician Name


Sixto Hardy


Attending Physician


Chris Yarbrough MD





Current Medications








 Medications


  (Trade)  Dose


 Ordered  Sig/Imelda


 Route


 PRN Reason  Start Time


 Stop Time Status Last Admin


Dose Admin


 


 Acetaminophen


  (Tylenol)  650 mg  Q6H  PRN


 ORAL


 Mild Pain/Temp > 100.5  9/5/19 06:45


 10/5/19 06:44  9/6/19 03:01


 


 


 Acetaminophen/


 Hydrocodone Bitart


  (Norco 5/325)  1 tab  Q4H  PRN


 ORAL


 Moderate Pain (Pain Scale 4-6)  9/6/19 11:15


 9/13/19 11:14  9/8/19 16:24


 


 


 Albuterol/


 Ipratropium


  (Albuterol/


 Ipratropium)  3 ml  Q4H  PRN


 HHN


 Shortness of Breath  9/6/19 11:15


 9/11/19 11:14   


 


 


 Aspirin


  (Ecotrin)  81 mg  DAILY


 ORAL


   9/5/19 09:00


 10/5/19 08:59  9/8/19 09:26


 


 


 Carvedilol


  (Coreg)  12.5 mg  EVERY 12  HOURS


 ORAL


   9/5/19 09:00


 10/5/19 08:59  9/8/19 09:26


 


 


 Ceftriaxone


 Sodium 1 gm/


 Dextrose  55 ml @ 


 110 mls/hr  DAILY


 IVPB


   9/5/19 09:00


 9/12/19 08:59  9/8/19 09:25


 


 


 Furosemide


  (Lasix)  40 mg  EVERY 12  HOURS


 IV


   9/6/19 21:00


 10/5/19 08:59  9/8/19 09:26


 


 


 Heparin Sodium


  (Porcine)


  (Heparin 5000


 units/ml)  5,000 units  EVERY 12  HOURS


 SUBQ


   9/5/19 09:00


 10/5/19 08:59  9/8/19 09:29


 


 


 Ondansetron HCl


  (Zofran)  4 mg  Q4H  PRN


 IVP


 Nausea & Vomiting  9/5/19 06:45


 10/5/19 06:44   


 


 


 Promethazine HCl/


 Codeine


  (Phenergan with


 Codeine)  5 ml  Q4H  PRN


 ORAL


 For Cough  9/5/19 11:00


 10/5/19 10:59   


 


 


 Vancomycin HCl


  (Vanco rx to


 dose)  1 ea  DAILY  PRN


 MISC


 Per rx protocol  9/5/19 06:45


 10/5/19 06:44   


 


 


 Vancomycin HCl/


 Dextrose  275 ml @ 


 137.5 mls/


 hr  Q24H


 IVPB


   9/7/19 09:00


 9/12/19 08:59  9/8/19 09:26


 








Allergies:  


Coded Allergies:  


     No Known Allergies (Unverified , 9/5/19)


ROS Limited/Unobtainable:  No


Constitutional:  Reports: no symptoms


HEENT:  Reports: no symptoms


Cardiovascular:  Reports: no symptoms


Respiratory:  Reports: shortness of breath


Gastrointestinal/Abdominal:  Reports: no symptoms


Genitourinary:  Reports: no symptoms


Neurologic/Psychiatric:  Reports: no symptoms


Subjective


83 YO F admitted with shortness of breath.  Now congestive heart failure.  

Cover for Int Med-Dr Yarbrough.





Objective





Last Vital Signs








  Date Time  Temp Pulse Resp B/P (MAP) Pulse Ox O2 Delivery O2 Flow Rate FiO2


 


9/8/19 16:54 98.1       


 


9/8/19 12:00  75 21 119/60 (79) 97   


 


9/8/19 09:00      Nasal Cannula 2.0 


 


9/8/19 07:19        28











Laboratory Tests








Test


  9/8/19


07:42


 


White Blood Count


  13.8 K/UL


(4.8-10.8)  H


 


Red Blood Count


  4.33 M/UL


(4.20-5.40)


 


Hemoglobin


  12.4 G/DL


(12.0-16.0)


 


Hematocrit


  37.6 %


(37.0-47.0)


 


Mean Corpuscular Volume 87 FL (80-99)  


 


Mean Corpuscular Hemoglobin


  28.7 PG


(27.0-31.0)


 


Mean Corpuscular Hemoglobin


Concent 33.0 G/DL


(32.0-36.0)


 


Red Cell Distribution Width


  12.4 %


(11.6-14.8)


 


Platelet Count


  272 K/UL


(150-450)


 


Mean Platelet Volume


  6.5 FL


(6.5-10.1)


 


Neutrophils (%) (Auto)


  73.6 %


(45.0-75.0)


 


Lymphocytes (%) (Auto)


  15.2 %


(20.0-45.0)  L


 


Monocytes (%) (Auto)


  6.7 %


(1.0-10.0)


 


Eosinophils (%) (Auto)


  3.7 %


(0.0-3.0)  H


 


Basophils (%) (Auto)


  0.8 %


(0.0-2.0)


 


Sodium Level


  142 MMOL/L


(136-145)


 


Potassium Level


  3.4 MMOL/L


(3.5-5.1)  L


 


Chloride Level


  101 MMOL/L


()


 


Carbon Dioxide Level


  32 MMOL/L


(21-32)


 


Anion Gap


  9 mmol/L


(5-15)


 


Blood Urea Nitrogen


  19 mg/dL


(7-18)  H


 


Creatinine


  1.1 MG/DL


(0.55-1.30)


 


Estimat Glomerular Filtration


Rate  mL/min (>60)  


 


 


Glucose Level


  111 MG/DL


()  H


 


Calcium Level


  9.3 MG/DL


(8.5-10.1)


 


Vancomycin Level Trough


  15.2 ug/mL


(5.0-12.0)  H











Microbiology








 Date/Time


Source Procedure


Growth Status


 


 


 9/6/19 15:35


Blood Blood Culture - Preliminary


NO GROWTH AFTER 24 HOURS Resulted


 


 9/6/19 15:32


Blood Blood Culture - Preliminary


NO GROWTH AFTER 24 HOURS Resulted

















Intake and Output  


 


 9/7/19 9/8/19





 18:59 06:59


 


Intake Total 320 ml 


 


Output Total 1000 ml 800 ml


 


Balance -680 ml -800 ml


 


  


 


Intake Oral 320 ml 


 


Output Urine Total 1000 ml 800 ml








Objective


PHYSICAL EXAMINATION:


GENERAL:  The patient is a well-developed and well-nourished 


American female, in no apparent distress.


HEENT:  Eyes, pupils are equal and responsive to light and accommodation.


Extraocular movements are intact.


NECK:  Supple without lymphadenopathy.


CHEST:  Lungs are clear to auscultation bilaterally without wheezes or


rales.


CARDIOVASCULAR:  Regular rhythm and rate.  S1 and S2 are normal without


murmurs, rubs, or gallops.


ABDOMEN:  Soft, nontender, and nondistended.  Positive bowel sounds.  No


evidence of hepatosplenomegaly.  Currently, no rebound or guarding


noted.


EXTREMITIES:  Negative for clubbing, cyanosis, or edema.


NEUROLOGICAL:  Cranial nerves II through XII are grossly intact without


focal deficits.  Motor strength is 5/5 bilaterally.  Deep tendon reflexes


are 2+ plantar.





Assessment/Plan


Assessment/Plan


ASSESSMENT:  This is an 84-year-old  female.





1. Shortness of breath.


2. Wheezing.


3. Edema of the bilateral lower extremities.


4. Diastolic Congestive heart failure-acute on chronic


5. Hypertension.


6.  Cellulitis left leg


7.  Bacteremia-Group B Strep





TREATMENT:


1. Shortness of breath/wheezing/congestive heart failure.  


Cardiology consultation has been obtained with Dr. Jacobson.  Echocardiogram LVEF=

55%.  


We will follow recommendation of Cardiology.


2. Hypertension.  


Continue chlorthalidone, losartan, and carvedilol as above.


3.  Continue Vanco and ceftriaxone per ID-Sixto Monk MD Sep 8, 2019 17:17

## 2019-09-08 NOTE — PULMONOLOGY PROGRESS NOTE
Assessment/Plan


Problems:  


(1) Acute on chronic diastolic congestive heart failure


(2) Acute bronchitis


(3) Cellulitis


(4) Cardiomegaly


(5) Mild pulmonary hypertension


(6) Peripheral edema


(7) Morbid obesity


(8) History of hypertension


Assessment/Plan


wbcstill high


no dyspnea ,on duoneb


continue abx


echo reviewed, EF of 55%, mild diastolic dysfunction with pulmonary hypertension


decrease lasix. BNP noted


dvt prophylaxis





pt will need NH placement.





Subjective


ROS Limited/Unobtainable:  No


Constitutional:  Reports: no symptoms


HEENT:  Repors: no symptoms


Allergies:  


Coded Allergies:  


     No Known Allergies (Unverified , 9/5/19)





Objective





Last 24 Hour Vital Signs








  Date Time  Temp Pulse Resp B/P (MAP) Pulse Ox O2 Delivery O2 Flow Rate FiO2


 


9/8/19 16:54 98.1       


 


9/8/19 16:00  81      


 


9/8/19 16:00 98.1 78 21 128/60 (82) 97   


 


9/8/19 12:00 98.1 75 21 119/60 (79) 97   


 


9/8/19 12:00  69      


 


9/8/19 09:26  82  125/62    


 


9/8/19 09:00  81      


 


9/8/19 09:00      Nasal Cannula 2.0 


 


9/8/19 08:00 98.1 82 22 125/62 (83) 97   


 


9/8/19 07:19     97 Nasal Cannula 2.0 28


 


9/8/19 04:00 97.5 79 17 133/75 (94) 99   


 


9/8/19 04:00  79      


 


9/8/19 00:00  77      


 


9/8/19 00:00 98.7 77 18 132/79 (96) 95   


 


9/7/19 21:00      Nasal Cannula 2.0 


 


9/7/19 20:49  87  132/62    


 


9/7/19 20:00  90      


 


9/7/19 20:00 97.1 90 17 126/72 (90) 95   


 


9/7/19 19:26     95 Nasal Cannula 2.0 28

















Intake and Output  


 


 9/7/19 9/8/19





 18:59 06:59


 


Intake Total 320 ml 


 


Output Total 1000 ml 800 ml


 


Balance -680 ml -800 ml


 


  


 


Intake Oral 320 ml 


 


Output Urine Total 1000 ml 800 ml








Objective


General Appearance:  WD/WN, morbidly obese


Lines, tubes and drains:  peripheral


HEENT:  normocephalic, atraumatic


Neck:  non-tender, normal alignment


Respiratory/Chest:  chest wall non-tender, lungs clear


Breasts:  no masses


Cardiovascular/Chest:  normal peripheral pulses


Abdomen:  normal bowel sounds, non tender


Genitourinary/Rectal:  normal genital exam


Extremities:  normal range of motion, severe edema


Skin Exam:  other - hyperpigmentation in lower extremities





Microbiology








 Date/Time


Source Procedure


Growth Status


 


 


 9/6/19 15:35


Blood Blood Culture - Preliminary


NO GROWTH AFTER 24 HOURS Resulted


 


 9/6/19 15:32


Blood Blood Culture - Preliminary


NO GROWTH AFTER 24 HOURS Resulted








Laboratory Tests


9/8/19 07:42: 


White Blood Count 13.8H, Red Blood Count 4.33, Hemoglobin 12.4, Hematocrit 37.6

, Mean Corpuscular Volume 87, Mean Corpuscular Hemoglobin 28.7, Mean 

Corpuscular Hemoglobin Concent 33.0, Red Cell Distribution Width 12.4, Platelet 

Count 272, Mean Platelet Volume 6.5, Neutrophils (%) (Auto) 73.6, Lymphocytes (%

) (Auto) 15.2L, Monocytes (%) (Auto) 6.7, Eosinophils (%) (Auto) 3.7H, 

Basophils (%) (Auto) 0.8, Sodium Level 142, Potassium Level 3.4L, Chloride 

Level 101, Carbon Dioxide Level 32, Anion Gap 9, Blood Urea Nitrogen 19H, 

Creatinine 1.1, Estimat Glomerular Filtration Rate , Glucose Level 111H, 

Calcium Level 9.3, Vancomycin Level Trough 15.2H





Current Medications








 Medications


  (Trade)  Dose


 Ordered  Sig/Imelda


 Route


 PRN Reason  Start Time


 Stop Time Status Last Admin


Dose Admin


 


 Acetaminophen


  (Tylenol)  650 mg  Q6H  PRN


 ORAL


 Mild Pain/Temp > 100.5  9/5/19 06:45


 10/5/19 06:44  9/6/19 03:01


 


 


 Acetaminophen/


 Hydrocodone Bitart


  (Norco 5/325)  1 tab  Q4H  PRN


 ORAL


 Moderate Pain (Pain Scale 4-6)  9/6/19 11:15


 9/13/19 11:14  9/8/19 16:24


 


 


 Albuterol/


 Ipratropium


  (Albuterol/


 Ipratropium)  3 ml  Q4H  PRN


 HHN


 Shortness of Breath  9/6/19 11:15


 9/11/19 11:14   


 


 


 Aspirin


  (Ecotrin)  81 mg  DAILY


 ORAL


   9/5/19 09:00


 10/5/19 08:59  9/8/19 09:26


 


 


 Carvedilol


  (Coreg)  12.5 mg  EVERY 12  HOURS


 ORAL


   9/5/19 09:00


 10/5/19 08:59  9/8/19 09:26


 


 


 Ceftriaxone


 Sodium 1 gm/


 Dextrose  55 ml @ 


 110 mls/hr  DAILY


 IVPB


   9/5/19 09:00


 9/12/19 08:59  9/8/19 09:25


 


 


 Furosemide


  (Lasix)  40 mg  EVERY 12  HOURS


 IV


   9/6/19 21:00


 10/5/19 08:59  9/8/19 09:26


 


 


 Heparin Sodium


  (Porcine)


  (Heparin 5000


 units/ml)  5,000 units  EVERY 12  HOURS


 SUBQ


   9/5/19 09:00


 10/5/19 08:59  9/8/19 09:29


 


 


 Ondansetron HCl


  (Zofran)  4 mg  Q4H  PRN


 IVP


 Nausea & Vomiting  9/5/19 06:45


 10/5/19 06:44   


 


 


 Promethazine HCl/


 Codeine


  (Phenergan with


 Codeine)  5 ml  Q4H  PRN


 ORAL


 For Cough  9/5/19 11:00


 10/5/19 10:59   


 


 


 Vancomycin HCl


  (Vanco rx to


 dose)  1 ea  DAILY  PRN


 MISC


 Per rx protocol  9/5/19 06:45


 10/5/19 06:44   


 


 


 Vancomycin HCl/


 Dextrose  275 ml @ 


 137.5 mls/


 hr  Q24H


 IVPB


   9/7/19 09:00


 9/12/19 08:59  9/8/19 09:26


 

















Aleisha Lagunas MD Sep 8, 2019 18:11

## 2019-09-08 NOTE — NUR
NURSE NOTES:

Received call from Yenifer of [facility]. may give vancomycin IV this am with Vanco 
trough result of 15.2.

## 2019-09-08 NOTE — NUR
NURSE NOTES:

Received pt from MARIELLA Walton. Pt is awake and resting in bed. Nasal cannula 2L on patient. Iv 
site intact. Bed locked in lowest position, call light within reach. Will continue with plan 
of care.

## 2019-09-08 NOTE — CARDIAC ELECTROPHYSIOLOGY PN
Assessment/Plan


Assessment/Plan


1. Worsening of the lower extremity edema.  Her echocardiogram shows EF


of 55%, so this is likely due to diastolic dysfunction as well as


lymphedema, as echo showed mild left-sided dysfunction.  


Continue Lasix 40 mg IV b.i.d. 





2. Hypertension.  Continue Coreg 12.5 mg b.i.d., Lasix, and   p.r.n.


clonidine 


3. Questionable pneumonia.  The patient already was seen by ID, started on IV 

antibiotic.


4. Left Lower extremity cellulitis.  No evidence of DVT.


The patient has had gram-positive bacteremia at [place], suspect from


cellulitis on IV antibiotic.


5. Morbid obesity.


OPAL RN





Subjective


Subjective


Alert in NAD. No CP or SOB. RN at bedside. On Abx.





Objective





Last 24 Hour Vital Signs








  Date Time  Temp Pulse Resp B/P (MAP) Pulse Ox O2 Delivery O2 Flow Rate FiO2


 


9/8/19 16:54 98.1       


 


9/8/19 16:00  81      


 


9/8/19 16:00 98.1 78 21 128/60 (82) 97   


 


9/8/19 12:00 98.1 75 21 119/60 (79) 97   


 


9/8/19 12:00  69      


 


9/8/19 09:26  82  125/62    


 


9/8/19 09:00  81      


 


9/8/19 09:00      Nasal Cannula 2.0 


 


9/8/19 08:00 98.1 82 22 125/62 (83) 97   


 


9/8/19 07:19     97 Nasal Cannula 2.0 28


 


9/8/19 04:00 97.5 79 17 133/75 (94) 99   


 


9/8/19 04:00  79      


 


9/8/19 00:00  77      


 


9/8/19 00:00 98.7 77 18 132/79 (96) 95   


 


9/7/19 21:00      Nasal Cannula 2.0 


 


9/7/19 20:49  87  132/62    


 


9/7/19 20:00  90      


 


9/7/19 20:00 97.1 90 17 126/72 (90) 95   


 


9/7/19 19:26     95 Nasal Cannula 2.0 28

















Intake and Output  


 


 9/7/19 9/8/19





 18:59 06:59


 


Intake Total 320 ml 


 


Output Total 1000 ml 800 ml


 


Balance -680 ml -800 ml


 


  


 


Intake Oral 320 ml 


 


Output Urine Total 1000 ml 800 ml











Laboratory Tests








Test


  9/8/19


07:42


 


White Blood Count


  13.8 K/UL


(4.8-10.8)  H


 


Red Blood Count


  4.33 M/UL


(4.20-5.40)


 


Hemoglobin


  12.4 G/DL


(12.0-16.0)


 


Hematocrit


  37.6 %


(37.0-47.0)


 


Mean Corpuscular Volume 87 FL (80-99)  


 


Mean Corpuscular Hemoglobin


  28.7 PG


(27.0-31.0)


 


Mean Corpuscular Hemoglobin


Concent 33.0 G/DL


(32.0-36.0)


 


Red Cell Distribution Width


  12.4 %


(11.6-14.8)


 


Platelet Count


  272 K/UL


(150-450)


 


Mean Platelet Volume


  6.5 FL


(6.5-10.1)


 


Neutrophils (%) (Auto)


  73.6 %


(45.0-75.0)


 


Lymphocytes (%) (Auto)


  15.2 %


(20.0-45.0)  L


 


Monocytes (%) (Auto)


  6.7 %


(1.0-10.0)


 


Eosinophils (%) (Auto)


  3.7 %


(0.0-3.0)  H


 


Basophils (%) (Auto)


  0.8 %


(0.0-2.0)


 


Sodium Level


  142 MMOL/L


(136-145)


 


Potassium Level


  3.4 MMOL/L


(3.5-5.1)  L


 


Chloride Level


  101 MMOL/L


()


 


Carbon Dioxide Level


  32 MMOL/L


(21-32)


 


Anion Gap


  9 mmol/L


(5-15)


 


Blood Urea Nitrogen


  19 mg/dL


(7-18)  H


 


Creatinine


  1.1 MG/DL


(0.55-1.30)


 


Estimat Glomerular Filtration


Rate  mL/min (>60)  


 


 


Glucose Level


  111 MG/DL


()  H


 


Calcium Level


  9.3 MG/DL


(8.5-10.1)


 


Vancomycin Level Trough


  15.2 ug/mL


(5.0-12.0)  H











Microbiology








 Date/Time


Source Procedure


Growth Status


 


 


 9/6/19 15:35


Blood Blood Culture - Preliminary


NO GROWTH AFTER 24 HOURS Resulted


 


 9/6/19 15:32


Blood Blood Culture - Preliminary


NO GROWTH AFTER 24 HOURS Resulted








Objective


HEAD AND NECK: No JVD.


LUNGS:  Decreased breath sounds.


CARDIOVASCULAR:  Regular, S1 and S2 with no gallop or murmur.


ABDOMEN:  Soft.


EXTREMITIES:  Bilateral 2+ pitting edema.Left leg ulcer covered with dressings











Francis Jacobson MD Sep 8, 2019 19:04

## 2019-09-08 NOTE — NUR
NURSE NOTES:

Received patient from Jeffery Hu. Patient is awake and alert. eating breakfast on sitting 
position. Safety precautions in place. side rails X2 up for safety. bed locked on lowest 
position. bed alarm activated. Patient oriented to call bell and its use. will anticipate 
needs an monitor patient throughout this shift.

## 2019-09-08 NOTE — NUR
NURSE NOTES:

Dr Yarbrough made aware of Potassium level of 3.4 new order received and carried out. Will 
follow.

## 2019-09-09 VITALS — SYSTOLIC BLOOD PRESSURE: 116 MMHG | DIASTOLIC BLOOD PRESSURE: 52 MMHG

## 2019-09-09 VITALS — SYSTOLIC BLOOD PRESSURE: 130 MMHG | DIASTOLIC BLOOD PRESSURE: 59 MMHG

## 2019-09-09 VITALS — SYSTOLIC BLOOD PRESSURE: 128 MMHG | DIASTOLIC BLOOD PRESSURE: 66 MMHG

## 2019-09-09 VITALS — DIASTOLIC BLOOD PRESSURE: 65 MMHG | SYSTOLIC BLOOD PRESSURE: 127 MMHG

## 2019-09-09 VITALS — SYSTOLIC BLOOD PRESSURE: 121 MMHG | DIASTOLIC BLOOD PRESSURE: 54 MMHG

## 2019-09-09 LAB
ADD MANUAL DIFF: NO
ANION GAP SERPL CALC-SCNC: 7 MMOL/L (ref 5–15)
BASOPHILS NFR BLD AUTO: 0.4 % (ref 0–2)
BUN SERPL-MCNC: 18 MG/DL (ref 7–18)
CALCIUM SERPL-MCNC: 9.1 MG/DL (ref 8.5–10.1)
CHLORIDE SERPL-SCNC: 101 MMOL/L (ref 98–107)
CO2 SERPL-SCNC: 33 MMOL/L (ref 21–32)
CREAT SERPL-MCNC: 1.1 MG/DL (ref 0.55–1.3)
EOSINOPHIL NFR BLD AUTO: 3.3 % (ref 0–3)
ERYTHROCYTE [DISTWIDTH] IN BLOOD BY AUTOMATED COUNT: 12.7 % (ref 11.6–14.8)
HCT VFR BLD CALC: 33.8 % (ref 37–47)
HGB BLD-MCNC: 11.2 G/DL (ref 12–16)
LYMPHOCYTES NFR BLD AUTO: 14.4 % (ref 20–45)
MCV RBC AUTO: 87 FL (ref 80–99)
MONOCYTES NFR BLD AUTO: 5.3 % (ref 1–10)
NEUTROPHILS NFR BLD AUTO: 76.6 % (ref 45–75)
PLATELET # BLD: 270 K/UL (ref 150–450)
POTASSIUM SERPL-SCNC: 3.7 MMOL/L (ref 3.5–5.1)
RBC # BLD AUTO: 3.89 M/UL (ref 4.2–5.4)
SODIUM SERPL-SCNC: 141 MMOL/L (ref 136–145)
WBC # BLD AUTO: 14.5 K/UL (ref 4.8–10.8)

## 2019-09-09 RX ADMIN — Medication SCH MLS/HR: at 09:26

## 2019-09-09 RX ADMIN — CARVEDILOL SCH MG: 12.5 TABLET, FILM COATED ORAL at 08:32

## 2019-09-09 RX ADMIN — ASPIRIN SCH MG: 81 TABLET, DELAYED RELEASE ORAL at 08:32

## 2019-09-09 RX ADMIN — HEPARIN SODIUM SCH UNITS: 5000 INJECTION INTRAVENOUS; SUBCUTANEOUS at 08:33

## 2019-09-09 RX ADMIN — SODIUM CHLORIDE SCH MLS/HR: 0.9 INJECTION INTRAVENOUS at 08:32

## 2019-09-09 NOTE — NUR
NURSE NOTES:

Report given to MARIELLA oCrdero from [facility]. Patient transferred to room 5-A. 
MARIELLA Cordero made aware continue IV ceftriaxone 2g IV daily for 9 days. ETA at 1730. 

-------------------------------------------------------------------------------

Addendum: 09/09/19 at 1933 by LAURENT MIRZA RN

-------------------------------------------------------------------------------

[facility] Tele : [phone]

## 2019-09-09 NOTE — NUR
****DISCHARGE PLANNING

PATIENT HAS BEEN ACCEPTED TO [facility]

AWAIT DISCHARGE ORDER

-------------------------------------------------------------------------------

Addendum: 09/09/19 at 1618 by DURGA ROBB CM

-------------------------------------------------------------------------------

SKILLED

## 2019-09-09 NOTE — NUR
NURSE NOTES:

Patient discharged to [facility] per Dr. Lagunas. Patient discharged with all 
belongings, patient's ID band removed and placed in shredder. Cardiac monitor returned to 
monitor tech. Patient transferred by BLS in a stable condition. Family member made aware.

## 2019-09-09 NOTE — NUR
RD ASSESSMENT & RECOMMENDATIONS

SEE CARE ACTIVITY FOR COMPLETE ASSESSMENT



DAILY ESTIMATED NEEDS:

Needs based on wounds, obese 61kg adj  

25-35  kcals/kg 

5008-1842  total kcals

1.25-1.5  g protein/kg

76-92  g total protein 

Fluid per MD, on lasix  





NUTRITION DIAGNOSIS:

Increased protein needs r/t wound healing, as evidenced by pt w/ multiple

wounds including BL buttock w/ full thickness injuries.





PO DIET RECOMMENDATIONS:

Maintain Cardiac diet/ texture per SLP  







ADDITIONAL RECOMMENDATIONS:

1) Recalibrate bed scale w/ added P200 mattress 

     + Daily wts on calibrated bed scale for diuretics 

2) Check lytes daily on lasix 

3) WOUND CARE: Add AUGUSTUS / fruit punch in 6oz water BID    

      + MVI x1 

      + Vit C 250 mg BID  

4) Add snacks in b.w meals w/ current variable po intake

## 2019-09-09 NOTE — DIAGNOSTIC IMAGING REPORT
Indication: Shortness of breath, cough

 

Technique: One view of the chest

 

Comparison: none

 

Findings: Inspiration is suboptimal. Atelectatic changes are seen at both lung bases.

The heart is borderline enlarged. Surgical clips are seen in the left axilla

 

Impression: No acute process. Findings as noted

## 2019-09-09 NOTE — NUR
NURSE NOTES:

Received report from MARIELLA Hu. Patient in bed resting, no active s/s cardiac, respiratory 
distress noticed at this time. Patient AOx3, SR with HR 75, patient on 2L oxygen via NC at 
this time. IV on right FA 22G, asymptomatic, patent, intact. Bed in lowest position, side 
rails upx2, call light within reach. will continue to monitor.

## 2019-09-09 NOTE — NUR
*-* DISCHARGE PLANNING *-*



PATIENT HAS BEEN REFERRED TO:



TOAN NÚÑEZ

P: [phone]

F: [phone]

## 2019-09-09 NOTE — INFECTIOUS DISEASES PROG NOTE
Assessment/Plan


Assessment/Plan





Abx:


IV Vancomycin 9/5-


Ceftriaxone 9/5-





Assessment:


SOB/wheezing- likely CHF exacerbation-


  -9/5 CXR:  No acute process. Findings as noted


  -CXR (at [place]): increased interstitial markings





L LE edema- 2ry to Cellulitis


   -V. duplex no DVT





GBS bacteremia (at [place])- suspect from cellulitis


  -9/4 Bcx 2/4 GBS; 9/6 Bcx NTD





Afebrile


Leukocytosis, increasing





MIKE, SP





CHF


HTN


s/p appendectomy


 morbid obesity





Plan:


-D/c empiric IV Vancomycin #5 


-Continue Ceftriaxone #5/14


    -ok to discharge on this regimen


-f/u cx


-Monitor CBC/CMP, temperatures


-f/u Bcx x2, sp cx


-wound care per hospital protocol





Thank you for this consultation. Will continue to follow along with you.





Subjective


Allergies:  


Coded Allergies:  


     No Known Allergies (Unverified , 9/5/19)


Subjective


afebrile


leukocytosis


Bcx NTD





Objective


Vital Signs





Last 24 Hour Vital Signs








  Date Time  Temp Pulse Resp B/P (MAP) Pulse Ox O2 Delivery O2 Flow Rate FiO2


 


9/9/19 09:00      Nasal Cannula 2.0 


 


9/9/19 08:32  81  130/59    


 


9/9/19 08:00  81      


 


9/9/19 08:00 98.8 81 18 130/59 (82) 94   


 


9/9/19 07:14     96 Nasal Cannula 2.0 28


 


9/9/19 04:00  75      


 


9/9/19 04:00 97.6 75 18 128/66 (86) 95   


 


9/9/19 00:00  76      


 


9/9/19 00:00 98.0 76 18 127/65 (85) 97   


 


9/8/19 21:28  76  115/56    


 


9/8/19 21:00      Nasal Cannula 2.0 


 


9/8/19 20:00 97.6 82 18 111/45 (67) 97   


 


9/8/19 20:00  82      


 


9/8/19 19:29     96 Nasal Cannula 2.0 28


 


9/8/19 16:54 98.1       


 


9/8/19 16:00  81      


 


9/8/19 16:00 98.1 78 21 128/60 (82) 97   


 


9/8/19 12:00 98.1 75 21 119/60 (79) 97   


 


9/8/19 12:00  69      








Height (Feet):  5


Height (Inches):  2.00


Weight (Pounds):  229


Objective


General Appearance:  NAD


HEENT:  normocephalic, atraumatic, EOMI


Respiratory/Chest:  CTAB


Cardiovascular: RRR, S1, S2


Abdomen:  normal bowel sounds, non tender





Microbiology








 Date/Time


Source Procedure


Growth Status


 


 


 9/6/19 15:35


Blood Blood Culture - Preliminary


NO GROWTH AFTER 48 HOURS Resulted


 


 9/6/19 15:32


Blood Blood Culture - Preliminary


NO GROWTH AFTER 48 HOURS Resulted











Laboratory Tests








Test


  9/9/19


05:36


 


White Blood Count


  14.5 K/UL


(4.8-10.8)  H


 


Red Blood Count


  3.89 M/UL


(4.20-5.40)  L


 


Hemoglobin


  11.2 G/DL


(12.0-16.0)  L


 


Hematocrit


  33.8 %


(37.0-47.0)  L


 


Mean Corpuscular Volume 87 FL (80-99)  


 


Mean Corpuscular Hemoglobin


  28.7 PG


(27.0-31.0)


 


Mean Corpuscular Hemoglobin


Concent 33.1 G/DL


(32.0-36.0)


 


Red Cell Distribution Width


  12.7 %


(11.6-14.8)


 


Platelet Count


  270 K/UL


(150-450)


 


Mean Platelet Volume


  6.5 FL


(6.5-10.1)


 


Neutrophils (%) (Auto)


  76.6 %


(45.0-75.0)  H


 


Lymphocytes (%) (Auto)


  14.4 %


(20.0-45.0)  L


 


Monocytes (%) (Auto)


  5.3 %


(1.0-10.0)


 


Eosinophils (%) (Auto)


  3.3 %


(0.0-3.0)  H


 


Basophils (%) (Auto)


  0.4 %


(0.0-2.0)


 


Sodium Level


  141 MMOL/L


(136-145)


 


Potassium Level


  3.7 MMOL/L


(3.5-5.1)


 


Chloride Level


  101 MMOL/L


()


 


Carbon Dioxide Level


  33 MMOL/L


(21-32)  H


 


Anion Gap


  7 mmol/L


(5-15)


 


Blood Urea Nitrogen


  18 mg/dL


(7-18)


 


Creatinine


  1.1 MG/DL


(0.55-1.30)


 


Estimat Glomerular Filtration


Rate  mL/min (>60)  


 


 


Glucose Level


  103 MG/DL


()


 


Calcium Level


  9.1 MG/DL


(8.5-10.1)











Current Medications








 Medications


  (Trade)  Dose


 Ordered  Sig/Imelda


 Route


 PRN Reason  Start Time


 Stop Time Status Last Admin


Dose Admin


 


 Acetaminophen


  (Tylenol)  650 mg  Q6H  PRN


 ORAL


 Mild Pain/Temp > 100.5  9/5/19 06:45


 10/5/19 06:44  9/6/19 03:01


 


 


 Acetaminophen/


 Hydrocodone Bitart


  (Norco 5/325)  1 tab  Q4H  PRN


 ORAL


 Moderate Pain (Pain Scale 4-6)  9/6/19 11:15


 9/13/19 11:14  9/8/19 16:24


 


 


 Albuterol/


 Ipratropium


  (Albuterol/


 Ipratropium)  3 ml  Q4H  PRN


 HHN


 Shortness of Breath  9/6/19 11:15


 9/11/19 11:14   


 


 


 Aspirin


  (Ecotrin)  81 mg  DAILY


 ORAL


   9/5/19 09:00


 10/5/19 08:59  9/9/19 08:32


 


 


 Carvedilol


  (Coreg)  12.5 mg  EVERY 12  HOURS


 ORAL


   9/5/19 09:00


 10/5/19 08:59  9/9/19 08:32


 


 


 Ceftriaxone


 Sodium 1 gm/


 Dextrose  55 ml @ 


 110 mls/hr  DAILY


 IVPB


   9/5/19 09:00


 9/12/19 08:59  9/9/19 08:32


 


 


 Furosemide


  (Lasix)  40 mg  EVERY 12  HOURS


 IV


   9/6/19 21:00


 10/5/19 08:59  9/9/19 08:32


 


 


 Heparin Sodium


  (Porcine)


  (Heparin 5000


 units/ml)  5,000 units  EVERY 12  HOURS


 SUBQ


   9/5/19 09:00


 10/5/19 08:59  9/9/19 08:33


 


 


 Magnesium Oxide


  (Mag-Ox 400mg)  400 mg  BID


 ORAL


   9/9/19 18:00


 10/9/19 17:59   


 


 


 Ondansetron HCl


  (Zofran)  4 mg  Q4H  PRN


 IVP


 Nausea & Vomiting  9/5/19 06:45


 10/5/19 06:44   


 


 


 Promethazine HCl/


 Codeine


  (Phenergan with


 Codeine)  5 ml  Q4H  PRN


 ORAL


 For Cough  9/5/19 11:00


 10/5/19 10:59   


 


 


 Vancomycin HCl


  (Vanco rx to


 dose)  1 ea  DAILY  PRN


 MISC


 Per rx protocol  9/5/19 06:45


 10/5/19 06:44   


 


 


 Vancomycin HCl/


 Dextrose  275 ml @ 


 137.5 mls/


 hr  Q24H


 IVPB


   9/7/19 09:00


 9/12/19 08:59  9/9/19 09:26


 

















Heather Salcido M.D. Sep 9, 2019 11:47

## 2019-09-09 NOTE — CARDIAC ELECTROPHYSIOLOGY PN
Assessment/Plan


Assessment/Plan


1. Worsening of the lower extremity edema.   EF 55%, so this is likely due to 

diastolic dysfunction    


Continue Lasix 40 mg IV b.i.d. 


2. Hypertension.  Continue Coreg 12.5 mg b.i.d., Lasix, and   p.r.n. clonidine 


3. Questionable pneumonia.  The patient already was seen by ID, started on IV 

antibiotic.


4. Left Lower extremity cellulitis.  No evidence of DVT.


5. Gram-positive bacteremia at [place], suspect from cellulitis on IV antibiotic.


6. Morbid obesity.





DW RN and Niece





Subjective


Subjective


Alert in NAD.  RN and nicolle at bedside. On Abx.





Objective





Last 24 Hour Vital Signs








  Date Time  Temp Pulse Resp B/P (MAP) Pulse Ox O2 Delivery O2 Flow Rate FiO2


 


9/9/19 09:00      Nasal Cannula 2.0 


 


9/9/19 08:32  81  130/59    


 


9/9/19 08:00  81      


 


9/9/19 08:00 98.8 81 18 130/59 (82) 94   


 


9/9/19 07:14     96 Nasal Cannula 2.0 28


 


9/9/19 04:00  75      


 


9/9/19 04:00 97.6 75 18 128/66 (86) 95   


 


9/9/19 00:00  76      


 


9/9/19 00:00 98.0 76 18 127/65 (85) 97   


 


9/8/19 21:28  76  115/56    


 


9/8/19 21:00      Nasal Cannula 2.0 


 


9/8/19 20:00 97.6 82 18 111/45 (67) 97   


 


9/8/19 20:00  82      


 


9/8/19 19:29     96 Nasal Cannula 2.0 28


 


9/8/19 16:54 98.1       


 


9/8/19 16:00  81      


 


9/8/19 16:00 98.1 78 21 128/60 (82) 97   


 


9/8/19 12:00 98.1 75 21 119/60 (79) 97   


 


9/8/19 12:00  69      

















Intake and Output  


 


 9/8/19 9/9/19





 19:00 07:00


 


Intake Total 320 ml 


 


Output Total 800 ml 400 ml


 


Balance -480 ml -400 ml


 


  


 


Intake Oral 320 ml 


 


Output Urine Total 800 ml 400 ml


 


# Voids 1 


 


# Bowel Movements 1 











Laboratory Tests








Test


  9/9/19


05:36


 


White Blood Count


  14.5 K/UL


(4.8-10.8)  H


 


Red Blood Count


  3.89 M/UL


(4.20-5.40)  L


 


Hemoglobin


  11.2 G/DL


(12.0-16.0)  L


 


Hematocrit


  33.8 %


(37.0-47.0)  L


 


Mean Corpuscular Volume 87 FL (80-99)  


 


Mean Corpuscular Hemoglobin


  28.7 PG


(27.0-31.0)


 


Mean Corpuscular Hemoglobin


Concent 33.1 G/DL


(32.0-36.0)


 


Red Cell Distribution Width


  12.7 %


(11.6-14.8)


 


Platelet Count


  270 K/UL


(150-450)


 


Mean Platelet Volume


  6.5 FL


(6.5-10.1)


 


Neutrophils (%) (Auto)


  76.6 %


(45.0-75.0)  H


 


Lymphocytes (%) (Auto)


  14.4 %


(20.0-45.0)  L


 


Monocytes (%) (Auto)


  5.3 %


(1.0-10.0)


 


Eosinophils (%) (Auto)


  3.3 %


(0.0-3.0)  H


 


Basophils (%) (Auto)


  0.4 %


(0.0-2.0)


 


Sodium Level


  141 MMOL/L


(136-145)


 


Potassium Level


  3.7 MMOL/L


(3.5-5.1)


 


Chloride Level


  101 MMOL/L


()


 


Carbon Dioxide Level


  33 MMOL/L


(21-32)  H


 


Anion Gap


  7 mmol/L


(5-15)


 


Blood Urea Nitrogen


  18 mg/dL


(7-18)


 


Creatinine


  1.1 MG/DL


(0.55-1.30)


 


Estimat Glomerular Filtration


Rate  mL/min (>60)  


 


 


Glucose Level


  103 MG/DL


()


 


Calcium Level


  9.1 MG/DL


(8.5-10.1)











Microbiology








 Date/Time


Source Procedure


Growth Status


 


 


 9/6/19 15:35


Blood Blood Culture - Preliminary


NO GROWTH AFTER 48 HOURS Resulted


 


 9/6/19 15:32


Blood Blood Culture - Preliminary


NO GROWTH AFTER 48 HOURS Resulted








Objective


HEAD AND NECK: No JVD.


LUNGS:  Decreased breath sounds.


CARDIOVASCULAR:  Regular, S1 and S2 with no gallop or murmur.


ABDOMEN:  Soft.


EXTREMITIES:  Bilateral 2+ pitting edema.Left leg ulcer covered with dressings











Francis Jacobson MD Sep 9, 2019 11:38

## 2019-09-09 NOTE — NUR
REFERRED BY DR MILLER (PRIMARY DR BALDERAS) FOR A SWALLOWING EVALUATION, SEE 

FULL REPORT IN [facility] ACTIVITY SECTION.



DYSPHAGIA RISK FACTORS FOR THIS 84 Y.O.F.:



ACUTE ISSUES: SOB, BRONCHITIS, RENAL INSUFFICIENCY, DECREASED ALBUMIN 0.6, 

CELLULITIS, LYMPHEDEMA, CHF, CARDIOMEGALY, POOR HISTORIAN PER M.REGINA.



RELEVANT MEDS: NORCO



COMORBITIES AND H/O: HTN



DIET/LIQUID HISTORY

FAMILY AND HER DTR DENY THAT SHE HAS SWALLOWING PROBLEMS. 

MORBIDLY OBESE PER MD AND HAS LOW ALBUMIN 0.6. NO POLST/ADVANCE DIRECTIVE 

REGARDING LONG TERM ARTIFICIAL NUTRITION IN CHART. PATIENT IS FROM HOME 

(LIVES ALONE). DIET AT HOME WAS REGULAR TEXTURE AND THIN LIQUIDS. 

CURRENTLY ON A CARDIAC REGULAR TEXTURE DIET WITH 

THIN LIQUIDS WITH POOR TO FAIR INTAKE (25/75%). RD CONSULT BUT REPORT DID 

NOT MENTION DIET TYPE RECOMMENDATIONS TO DATE. PER RNLAURENT, NO OVERT S/S 

OF ASPIRATION WITH CURRENT DIET/LIQUIDS AND TAKES MEDS WHOLE WITH WATER.



ALERT AND ABLE TO EXPRESS NEEDS. HAS  2 LITERS 02 NC. HAS UPPER/LOWER DENTURES THAT FIT 
WELL, TONGUE SLIGHT WHITISH, NEEDS ORAL CARE.



INITIAL IMPRESSIONS 

GROSSLY FUNCTIONAL SWALLOW WITH THIN LIQUIDS VIA STRAW SEQUENTIAL SIPS, PUREED TSP, AND 
MASTICATED SOLIDS (1/2 CRACKER) W/O OVERT ASPIRATION.

? SILENT ASP RISK 

NO INCREASE IN RR WITH PO INTAKE



INTAKE 25 TO 75%



RECOMMENDATIONS:

CONTINUE WITH CURRENT DIET/LIQUIDS 



CONSIDER MOD BARIUM SWALLOW STUDY ONLY IF INDICATED AND BREATHING ISSUES ARE WORSENED AS OP 
ONLY TO FURTHER ASSESS SWALLOW, DETERMINE IF THERE IS A SILENT ASP RISK, AND ATTEMPT TRIAL 
TX.



WILL D/C FROM SKILLED SLP SERVICES AT THIS TIME SINCE SKILLS ARE GROSSY FUNCTIONAL.  



EDUCATED DTR AND PATIENT ON NEED TO CLEAN DENTURES AND REMOVE WHEN PT IS SLEEPING. DTR TO 
BRING IN DENTURE .  



D/W RN, LAURENT, AND PATIENT/DTR.

## 2019-09-09 NOTE — NUR
CASE MANAGEMENT:REVIEW



9/9/19

SI: AC/CHR CHF. CELLULITIS

PERIPHERAL EDEMA

97.6   75  18  128/66  95% ON 2L/NC

WBC+14.5



IS: IV VANCOMYCIN Q24

IV ROCEPHIN Q24

IV LASIX Q12

HEPARIN SQ Q12

ASA PO QD

COREG PO Q12

**: TELEMETRY STATUS

DCP: FROM HOME ~ REFER TO SNF?

## 2019-09-09 NOTE — PULMONOLOGY PROGRESS NOTE
Assessment/Plan


Problems:  


(1) Acute on chronic diastolic congestive heart failure


(2) Acute bronchitis


(3) Cellulitis


(4) Cardiomegaly


(5) Mild pulmonary hypertension


(6) Peripheral edema


(7) Morbid obesity


(8) History of hypertension


Assessment/Plan


wbc still high,unchaged


no dyspnea ,on duoneb


continue abx


echo reviewed, EF of 55%, mild diastolic dysfunction with pulmonary hypertension


on Lasix BID, watch bun/creatinine


dvt prophylaxis





pt agreed with  NH placement.





Subjective


ROS Limited/Unobtainable:  No


Interval Events:  no new complains


Allergies:  


Coded Allergies:  


     No Known Allergies (Unverified , 9/5/19)





Objective





Last 24 Hour Vital Signs








  Date Time  Temp Pulse Resp B/P (MAP) Pulse Ox O2 Delivery O2 Flow Rate FiO2


 


9/9/19 09:00      Nasal Cannula 2.0 


 


9/9/19 08:32  81  130/59    


 


9/9/19 08:00  81      


 


9/9/19 08:00 98.8 81 18 130/59 (82) 94   


 


9/9/19 07:14     96 Nasal Cannula 2.0 28


 


9/9/19 04:00  75      


 


9/9/19 04:00 97.6 75 18 128/66 (86) 95   


 


9/9/19 00:00  76      


 


9/9/19 00:00 98.0 76 18 127/65 (85) 97   


 


9/8/19 21:28  76  115/56    


 


9/8/19 21:00      Nasal Cannula 2.0 


 


9/8/19 20:00 97.6 82 18 111/45 (67) 97   


 


9/8/19 20:00  82      


 


9/8/19 19:29     96 Nasal Cannula 2.0 28


 


9/8/19 16:54 98.1       


 


9/8/19 16:00  81      


 


9/8/19 16:00 98.1 78 21 128/60 (82) 97   


 


9/8/19 12:00 98.1 75 21 119/60 (79) 97   


 


9/8/19 12:00  69      

















Intake and Output  


 


 9/8/19 9/9/19





 19:00 07:00


 


Intake Total 320 ml 


 


Output Total 800 ml 400 ml


 


Balance -480 ml -400 ml


 


  


 


Intake Oral 320 ml 


 


Output Urine Total 800 ml 400 ml


 


# Voids 1 


 


# Bowel Movements 1 








Objective


General Appearance:  WD/WN, morbidly obese


Lines, tubes and drains:  peripheral


HEENT:  normocephalic, atraumatic


Neck:  non-tender, normal alignment


Respiratory/Chest:  chest wall non-tender, lungs clear


Breasts:  no masses


Cardiovascular/Chest:  normal peripheral pulses


Abdomen:  normal bowel sounds, non tender


Genitourinary/Rectal:  normal genital exam


Extremities:  normal range of motion, severe edema


Skin Exam:  other - hyperpigmentation in lower extremities





Microbiology








 Date/Time


Source Procedure


Growth Status


 


 


 9/6/19 15:35


Blood Blood Culture - Preliminary


NO GROWTH AFTER 48 HOURS Resulted


 


 9/6/19 15:32


Blood Blood Culture - Preliminary


NO GROWTH AFTER 48 HOURS Resulted








Laboratory Tests


9/9/19 05:36: 


White Blood Count 14.5H, Red Blood Count 3.89L, Hemoglobin 11.2L, Hematocrit 

33.8L, Mean Corpuscular Volume 87, Mean Corpuscular Hemoglobin 28.7, Mean 

Corpuscular Hemoglobin Concent 33.1, Red Cell Distribution Width 12.7, Platelet 

Count 270, Mean Platelet Volume 6.5, Neutrophils (%) (Auto) 76.6H, Lymphocytes (

%) (Auto) 14.4L, Monocytes (%) (Auto) 5.3, Eosinophils (%) (Auto) 3.3H, 

Basophils (%) (Auto) 0.4, Sodium Level 141, Potassium Level 3.7, Chloride Level 

101, Carbon Dioxide Level 33H, Anion Gap 7, Blood Urea Nitrogen 18, Creatinine 

1.1, Estimat Glomerular Filtration Rate , Glucose Level 103, Calcium Level 9.1





Current Medications








 Medications


  (Trade)  Dose


 Ordered  Sig/Imelda


 Route


 PRN Reason  Start Time


 Stop Time Status Last Admin


Dose Admin


 


 Acetaminophen


  (Tylenol)  650 mg  Q6H  PRN


 ORAL


 Mild Pain/Temp > 100.5  9/5/19 06:45


 10/5/19 06:44  9/6/19 03:01


 


 


 Acetaminophen/


 Hydrocodone Bitart


  (Norco 5/325)  1 tab  Q4H  PRN


 ORAL


 Moderate Pain (Pain Scale 4-6)  9/6/19 11:15


 9/13/19 11:14  9/8/19 16:24


 


 


 Albuterol/


 Ipratropium


  (Albuterol/


 Ipratropium)  3 ml  Q4H  PRN


 HHN


 Shortness of Breath  9/6/19 11:15


 9/11/19 11:14   


 


 


 Aspirin


  (Ecotrin)  81 mg  DAILY


 ORAL


   9/5/19 09:00


 10/5/19 08:59  9/9/19 08:32


 


 


 Carvedilol


  (Coreg)  12.5 mg  EVERY 12  HOURS


 ORAL


   9/5/19 09:00


 10/5/19 08:59  9/9/19 08:32


 


 


 Ceftriaxone


 Sodium 1 gm/


 Dextrose  55 ml @ 


 110 mls/hr  DAILY


 IVPB


   9/5/19 09:00


 9/12/19 08:59  9/9/19 08:32


 


 


 Furosemide


  (Lasix)  40 mg  EVERY 12  HOURS


 IV


   9/6/19 21:00


 10/5/19 08:59  9/9/19 08:32


 


 


 Heparin Sodium


  (Porcine)


  (Heparin 5000


 units/ml)  5,000 units  EVERY 12  HOURS


 SUBQ


   9/5/19 09:00


 10/5/19 08:59  9/9/19 08:33


 


 


 Ondansetron HCl


  (Zofran)  4 mg  Q4H  PRN


 IVP


 Nausea & Vomiting  9/5/19 06:45


 10/5/19 06:44   


 


 


 Promethazine HCl/


 Codeine


  (Phenergan with


 Codeine)  5 ml  Q4H  PRN


 ORAL


 For Cough  9/5/19 11:00


 10/5/19 10:59   


 


 


 Vancomycin HCl


  (Vanco rx to


 dose)  1 ea  DAILY  PRN


 MISC


 Per rx protocol  9/5/19 06:45


 10/5/19 06:44   


 


 


 Vancomycin HCl/


 Dextrose  275 ml @ 


 137.5 mls/


 hr  Q24H


 IVPB


   9/7/19 09:00


 9/12/19 08:59  9/9/19 09:26


 

















Aleisha Lagunas MD Sep 9, 2019 11:42 no

## 2019-09-09 NOTE — INTERNAL MED PROGRESS NOTE
Subjective


Physician Name


Sixto Hardy


Attending Physician


Chris Yarbrough MD





Current Medications








 Medications


  (Trade)  Dose


 Ordered  Sig/Imelda


 Route


 PRN Reason  Start Time


 Stop Time Status Last Admin


Dose Admin


 


 Acetaminophen


  (Tylenol)  650 mg  Q6H  PRN


 ORAL


 Mild Pain/Temp > 100.5  9/5/19 06:45


 10/5/19 06:44  9/6/19 03:01


 


 


 Acetaminophen/


 Hydrocodone Bitart


  (Norco 5/325)  1 tab  Q4H  PRN


 ORAL


 Moderate Pain (Pain Scale 4-6)  9/6/19 11:15


 9/13/19 11:14  9/8/19 16:24


 


 


 Albuterol/


 Ipratropium


  (Albuterol/


 Ipratropium)  3 ml  Q4H  PRN


 HHN


 Shortness of Breath  9/6/19 11:15


 9/11/19 11:14   


 


 


 Aspirin


  (Ecotrin)  81 mg  DAILY


 ORAL


   9/5/19 09:00


 10/5/19 08:59  9/9/19 08:32


 


 


 Carvedilol


  (Coreg)  12.5 mg  EVERY 12  HOURS


 ORAL


   9/5/19 09:00


 10/5/19 08:59  9/9/19 08:32


 


 


 Ceftriaxone


 Sodium 1 gm/


 Dextrose  55 ml @ 


 110 mls/hr  DAILY


 IVPB


   9/5/19 09:00


 9/12/19 08:59  9/9/19 08:32


 


 


 Furosemide


  (Lasix)  40 mg  EVERY 12  HOURS


 IV


   9/6/19 21:00


 10/5/19 08:59  9/9/19 08:32


 


 


 Heparin Sodium


  (Porcine)


  (Heparin 5000


 units/ml)  5,000 units  EVERY 12  HOURS


 SUBQ


   9/5/19 09:00


 10/5/19 08:59  9/9/19 08:33


 


 


 Magnesium Oxide


  (Mag-Ox 400mg)  400 mg  BID


 ORAL


   9/9/19 18:00


 10/9/19 17:59  9/9/19 18:13


 


 


 Ondansetron HCl


  (Zofran)  4 mg  Q4H  PRN


 IVP


 Nausea & Vomiting  9/5/19 06:45


 10/5/19 06:44   


 


 


 Promethazine HCl/


 Codeine


  (Phenergan with


 Codeine)  5 ml  Q4H  PRN


 ORAL


 For Cough  9/5/19 11:00


 10/5/19 10:59   


 


 


 Vancomycin HCl


  (Vanco rx to


 dose)  1 ea  DAILY  PRN


 MISC


 Per rx protocol  9/5/19 06:45


 10/5/19 06:44   


 


 


 Vancomycin HCl/


 Dextrose  275 ml @ 


 137.5 mls/


 hr  Q24H


 IVPB


   9/7/19 09:00


 9/12/19 08:59  9/9/19 09:26


 








Allergies:  


Coded Allergies:  


     No Known Allergies (Unverified , 9/5/19)


ROS Limited/Unobtainable:  No


Constitutional:  Reports: no symptoms


HEENT:  Reports: no symptoms


Cardiovascular:  Reports: no symptoms


Respiratory:  Reports: no symptoms


Gastrointestinal/Abdominal:  Reports: no symptoms


Genitourinary:  Reports: no symptoms


Neurologic/Psychiatric:  Reports: no symptoms


Subjective


83 YO F admitted with shortness of breath.  Now cellulitis left leg and sepsis.

  Cover for Int Med-Dr Yarbrough.





Objective





Last Vital Signs








  Date Time  Temp Pulse Resp B/P (MAP) Pulse Ox O2 Delivery O2 Flow Rate FiO2


 


9/9/19 12:00  76      


 


9/9/19 12:00 98.5  18 121/54 (76) 97   


 


9/9/19 09:00      Nasal Cannula 2.0 


 


9/9/19 07:14        28











Laboratory Tests








Test


  9/9/19


05:36


 


White Blood Count


  14.5 K/UL


(4.8-10.8)  H


 


Red Blood Count


  3.89 M/UL


(4.20-5.40)  L


 


Hemoglobin


  11.2 G/DL


(12.0-16.0)  L


 


Hematocrit


  33.8 %


(37.0-47.0)  L


 


Mean Corpuscular Volume 87 FL (80-99)  


 


Mean Corpuscular Hemoglobin


  28.7 PG


(27.0-31.0)


 


Mean Corpuscular Hemoglobin


Concent 33.1 G/DL


(32.0-36.0)


 


Red Cell Distribution Width


  12.7 %


(11.6-14.8)


 


Platelet Count


  270 K/UL


(150-450)


 


Mean Platelet Volume


  6.5 FL


(6.5-10.1)


 


Neutrophils (%) (Auto)


  76.6 %


(45.0-75.0)  H


 


Lymphocytes (%) (Auto)


  14.4 %


(20.0-45.0)  L


 


Monocytes (%) (Auto)


  5.3 %


(1.0-10.0)


 


Eosinophils (%) (Auto)


  3.3 %


(0.0-3.0)  H


 


Basophils (%) (Auto)


  0.4 %


(0.0-2.0)


 


Sodium Level


  141 MMOL/L


(136-145)


 


Potassium Level


  3.7 MMOL/L


(3.5-5.1)


 


Chloride Level


  101 MMOL/L


()


 


Carbon Dioxide Level


  33 MMOL/L


(21-32)  H


 


Anion Gap


  7 mmol/L


(5-15)


 


Blood Urea Nitrogen


  18 mg/dL


(7-18)


 


Creatinine


  1.1 MG/DL


(0.55-1.30)


 


Estimat Glomerular Filtration


Rate  mL/min (>60)  


 


 


Glucose Level


  103 MG/DL


()


 


Calcium Level


  9.1 MG/DL


(8.5-10.1)

















Intake and Output  


 


 9/8/19 9/9/19





 19:00 07:00


 


Intake Total 320 ml 


 


Output Total 800 ml 400 ml


 


Balance -480 ml -400 ml


 


  


 


Intake Oral 320 ml 


 


Output Urine Total 800 ml 400 ml


 


# Voids 1 


 


# Bowel Movements 1 








Objective


PHYSICAL EXAMINATION:


GENERAL:  The patient is a well-developed and well-nourished 


[place] female, in no apparent distress.


HEENT:  Eyes, pupils are equal and responsive to light and accommodation.


Extraocular movements are intact.


NECK:  Supple without lymphadenopathy.


CHEST:  Lungs are clear to auscultation bilaterally without wheezes or


rales.


CARDIOVASCULAR:  Regular rhythm and rate.  S1 and S2 are normal without


murmurs, rubs, or gallops.


ABDOMEN:  Soft, nontender, and nondistended.  Positive bowel sounds.  No


evidence of hepatosplenomegaly.  Currently, no rebound or guarding


noted.


EXTREMITIES:  Negative for clubbing, cyanosis, or edema.


NEUROLOGICAL:  Cranial nerves II through XII are grossly intact without


focal deficits.  Motor strength is 5/5 bilaterally.  Deep tendon reflexes


are 2+ plantar.





Assessment/Plan


Assessment/Plan


ASSESSMENT:  This is an 84-year-old  female.





1. Shortness of breath.


2. Wheezing.


3. Edema of the bilateral lower extremities.


4. Diastolic Congestive heart failure-acute on chronic


5. Hypertension.


6.  Cellulitis left leg


7.  Bacteremia-Group B Strep





TREATMENT:


1. Shortness of breath/wheezing/congestive heart failure.  


Cardiology consultation has been obtained with Dr. Jacobson.  Echocardiogram LVEF=

55%.  


We will follow recommendation of Cardiology.


2. Hypertension.  


Continue chlorthalidone, losartan, and carvedilol as above.


3.  Continue Vanco and ceftriaxone per ID-Sixto Monk MD Sep 9, 2019 18:30

## 2019-09-10 NOTE — DISCHARGE SUMMARY
Discharge Summary


Discharge Summary


_


DATE OF ADMISSION: 9/4/2019





DATE OF DISCHARGE: 9/9/2019








DISCHARGED BY: Dr. Yarbrough





REASON FOR ADMISSION: 


84 years old female with past medical history of congestive heart failure, 

hypertension, peripheral edema, morbid obesity, initially was taken by 

paramedics to [facility] due to shortness of breath.  Chest x-ray at [facility] 

revealed increased interstitial markings. 


Clinical exam revealed left lower extremity edema and signs of cellulitis. 

Laboratory workup revealed leukocytosis, renal insufficiency and elevated pro 

BNP.  


Patient subsequently was transferred to [facility] for insurance 

purposes.  





CONSULTANTS:


cardiologist Dr. Davila


hospitalist Dr. Lagunas


ID specialist Dr. Salcido 


plastic surgeon Dr. Spear


 


 


[facility] COURSE: 


Patient admitted to telemetry floor.  Cardiologist followed.  


Echocardiogram revealed preserved ejection fraction of 55% with no evidence of 

left ventricular hypertrophy.  


No evidence of wall motion abnormality.  Grade 1 diastolic dysfunction.  


Right ventricular systolic pressure of 38 consistent with a mild pulmonary 

hypertension.  


Chest x-ray demonstrated borderline cardiomegaly.  


Venous duplex bilateral lower extremity revealed no evidence of acute DVT.  


Supplemental oxygen provided as needed to keep pulse oximetry above 92%.  


Pulmonary toilet with via handheld nebulizing therapy with bronchodilator 

provided.  


Patient started on IV Lasix with close monitoring of  volumes and cardiorenal 

parameters.  


DVT prophylaxis provided.  


Antiplatelet therapy with aspirin continued.  


ProBNP trended down from 2535 to  1127.





Infectious disease specialist followed.  


Antibiotic provided as per infectious disease specialist recommendation.


Left lower extremity edema  was secondary to cellulitis.  


Patient had strep group B bacteremia at [facility],  suspected from  cellulitis.  


Repeated blood cultures were negative.


Patient remained afebrile , still with leukocytosis.


Sputum culture was negative.


Stool for C. difficile was negative. 


Patient completed vancomycin for 5 days.


Infectious disease specialist recommended  continue ceftriaxone to complete the 

treatment for total of 2 weeks at the facility.





Renal parameters and electrolytes were closely monitored.


Electrolytes corrected as needed.  


BUN from 40 down to 18 and creatinine from 1.5 down to 1.1.  


Acute kidney injury resolved.





Plastic surgeon seen and evaluated patient for present on admission un-

stageable ulcer of right medial buttock.


Wound care provided as recommended as per plastic surgeon to loosen the slough 

and determine more accurate staging.  


Moisture was kept under control, and patient was offloaded every 2 hours.


No need for surgical intervention at this time.  Continue wound care at the 

facility.


Supportive care provided.  


Bowel regimen instituted.





Patient clinically stabilized and was ready for transfer back to skilled 

nursing facility for continuation of care.





FINAL DIAGNOSES: 


Sepsis with group B strep bacteremia at [facility], likely  due to cellulitis


Cellulitis left lower extremity


Acute on chronic diastolic congestive heart failure


Acute kidney injury-resolved


Hypertension


Morbid obesity


Right medial buttock un-stageable pressure ulcer,  present on admission 





DISCHARGE MEDICATIONS:


See Medication Reconciliation list.





DISCHARGE INSTRUCTIONS:


Patient was discharged to the skilled nursing facility. 


Follow up with medical doctor at the facility.





I have been assigned to dictate discharge summary for this account. I was not 

involved in the patient's management.











Lennie Rutledge NP Sep 10, 2019 10:42

## 2019-09-11 NOTE — DIAGNOSTIC IMAGING REPORT
--------------- APPROVED REPORT --------------





CPT Code: 67341



Present Symptoms

Comments: EDEMA





BILATERAL: Imaging reveals a patent deep venous system bilaterally. There is no evidence 

of thrombus within the common femoral, superficial femoral, popliteal or tibial segments. 

The greater saphenous veins are within normal limits. Doppler indicates normal 

spontaneous flow within these segments.

## 2020-02-25 ENCOUNTER — HOSPITAL ENCOUNTER (INPATIENT)
Dept: HOSPITAL 72 - 4E | Age: 85
LOS: 9 days | Discharge: NURSING, REHAB OR LONG-TERM CARE | DRG: 299 | End: 2020-03-05
Payer: MEDICARE

## 2020-02-25 VITALS — WEIGHT: 210 LBS | HEIGHT: 62 IN | BODY MASS INDEX: 38.64 KG/M2

## 2020-02-25 VITALS — DIASTOLIC BLOOD PRESSURE: 71 MMHG | SYSTOLIC BLOOD PRESSURE: 154 MMHG

## 2020-02-25 DIAGNOSIS — I89.0: ICD-10-CM

## 2020-02-25 DIAGNOSIS — I50.33: ICD-10-CM

## 2020-02-25 DIAGNOSIS — N17.0: ICD-10-CM

## 2020-02-25 DIAGNOSIS — I82.412: Primary | ICD-10-CM

## 2020-02-25 DIAGNOSIS — L03.116: ICD-10-CM

## 2020-02-25 DIAGNOSIS — E66.01: ICD-10-CM

## 2020-02-25 DIAGNOSIS — I11.0: ICD-10-CM

## 2020-02-25 DIAGNOSIS — D64.9: ICD-10-CM

## 2020-02-25 DIAGNOSIS — L03.115: ICD-10-CM

## 2020-02-25 DIAGNOSIS — I27.20: ICD-10-CM

## 2020-02-25 DIAGNOSIS — Z79.82: ICD-10-CM

## 2020-02-25 PROCEDURE — 84100 ASSAY OF PHOSPHORUS: CPT

## 2020-02-25 PROCEDURE — 36415 COLL VENOUS BLD VENIPUNCTURE: CPT

## 2020-02-25 PROCEDURE — 84550 ASSAY OF BLOOD/URIC ACID: CPT

## 2020-02-25 PROCEDURE — 76770 US EXAM ABDO BACK WALL COMP: CPT

## 2020-02-25 PROCEDURE — 85025 COMPLETE CBC W/AUTO DIFF WBC: CPT

## 2020-02-25 PROCEDURE — 83880 ASSAY OF NATRIURETIC PEPTIDE: CPT

## 2020-02-25 PROCEDURE — 80202 ASSAY OF VANCOMYCIN: CPT

## 2020-02-25 PROCEDURE — 80061 LIPID PANEL: CPT

## 2020-02-25 PROCEDURE — 85651 RBC SED RATE NONAUTOMATED: CPT

## 2020-02-25 PROCEDURE — 82607 VITAMIN B-12: CPT

## 2020-02-25 PROCEDURE — 83036 HEMOGLOBIN GLYCOSYLATED A1C: CPT

## 2020-02-25 PROCEDURE — 81001 URINALYSIS AUTO W/SCOPE: CPT

## 2020-02-25 PROCEDURE — 83735 ASSAY OF MAGNESIUM: CPT

## 2020-02-25 PROCEDURE — 83540 ASSAY OF IRON: CPT

## 2020-02-25 PROCEDURE — 82378 CARCINOEMBRYONIC ANTIGEN: CPT

## 2020-02-25 PROCEDURE — 85007 BL SMEAR W/DIFF WBC COUNT: CPT

## 2020-02-25 PROCEDURE — 82728 ASSAY OF FERRITIN: CPT

## 2020-02-25 PROCEDURE — 86140 C-REACTIVE PROTEIN: CPT

## 2020-02-25 PROCEDURE — 87040 BLOOD CULTURE FOR BACTERIA: CPT

## 2020-02-25 PROCEDURE — 71045 X-RAY EXAM CHEST 1 VIEW: CPT

## 2020-02-25 PROCEDURE — 83550 IRON BINDING TEST: CPT

## 2020-02-25 PROCEDURE — 82270 OCCULT BLOOD FECES: CPT

## 2020-02-25 PROCEDURE — 80076 HEPATIC FUNCTION PANEL: CPT

## 2020-02-25 PROCEDURE — 85044 MANUAL RETICULOCYTE COUNT: CPT

## 2020-02-25 PROCEDURE — 87086 URINE CULTURE/COLONY COUNT: CPT

## 2020-02-25 PROCEDURE — 85060 BLOOD SMEAR INTERPRETATION: CPT

## 2020-02-25 PROCEDURE — 80048 BASIC METABOLIC PNL TOTAL CA: CPT

## 2020-02-25 PROCEDURE — 83615 LACTATE (LD) (LDH) ENZYME: CPT

## 2020-02-25 PROCEDURE — 85730 THROMBOPLASTIN TIME PARTIAL: CPT

## 2020-02-25 PROCEDURE — 93970 EXTREMITY STUDY: CPT

## 2020-02-25 PROCEDURE — 85610 PROTHROMBIN TIME: CPT

## 2020-02-25 PROCEDURE — 80053 COMPREHEN METABOLIC PANEL: CPT

## 2020-02-25 PROCEDURE — 82746 ASSAY OF FOLIC ACID SERUM: CPT

## 2020-02-25 RX ADMIN — Medication SCH MLS/HR: at 23:29

## 2020-02-25 NOTE — NUR
NURSE NOTES:



Admitted patient direct admit from [facility], awake, alert, verbal, essentially stable 
vital signs, on bed rest. Physical assessment done, called attending MD for admitting 
orders.

## 2020-02-26 VITALS — SYSTOLIC BLOOD PRESSURE: 154 MMHG | DIASTOLIC BLOOD PRESSURE: 70 MMHG

## 2020-02-26 VITALS — SYSTOLIC BLOOD PRESSURE: 147 MMHG | DIASTOLIC BLOOD PRESSURE: 56 MMHG

## 2020-02-26 VITALS — DIASTOLIC BLOOD PRESSURE: 60 MMHG | SYSTOLIC BLOOD PRESSURE: 132 MMHG

## 2020-02-26 VITALS — DIASTOLIC BLOOD PRESSURE: 57 MMHG | SYSTOLIC BLOOD PRESSURE: 145 MMHG

## 2020-02-26 VITALS — SYSTOLIC BLOOD PRESSURE: 144 MMHG | DIASTOLIC BLOOD PRESSURE: 66 MMHG

## 2020-02-26 LAB
ADD MANUAL DIFF: NO
ANION GAP SERPL CALC-SCNC: 8 MMOL/L (ref 5–15)
BASOPHILS NFR BLD AUTO: 0.6 % (ref 0–2)
BUN SERPL-MCNC: 10 MG/DL (ref 7–18)
CALCIUM SERPL-MCNC: 9.4 MG/DL (ref 8.5–10.1)
CHLORIDE SERPL-SCNC: 108 MMOL/L (ref 98–107)
CO2 SERPL-SCNC: 30 MMOL/L (ref 21–32)
CREAT SERPL-MCNC: 0.9 MG/DL (ref 0.55–1.3)
EOSINOPHIL NFR BLD AUTO: 3.9 % (ref 0–3)
ERYTHROCYTE [DISTWIDTH] IN BLOOD BY AUTOMATED COUNT: 12.6 % (ref 11.6–14.8)
HCT VFR BLD CALC: 29.7 % (ref 37–47)
HGB BLD-MCNC: 10.1 G/DL (ref 12–16)
LYMPHOCYTES NFR BLD AUTO: 15.1 % (ref 20–45)
MCV RBC AUTO: 86 FL (ref 80–99)
MONOCYTES NFR BLD AUTO: 6.9 % (ref 1–10)
NEUTROPHILS NFR BLD AUTO: 73.5 % (ref 45–75)
PLATELET # BLD: 196 K/UL (ref 150–450)
POTASSIUM SERPL-SCNC: 3.6 MMOL/L (ref 3.5–5.1)
RBC # BLD AUTO: 3.46 M/UL (ref 4.2–5.4)
SODIUM SERPL-SCNC: 145 MMOL/L (ref 136–145)
WBC # BLD AUTO: 8.9 K/UL (ref 4.8–10.8)

## 2020-02-26 RX ADMIN — RIVAROXABAN SCH MG: 15 TABLET, FILM COATED ORAL at 18:25

## 2020-02-26 RX ADMIN — CARVEDILOL SCH MG: 25 TABLET, FILM COATED ORAL at 18:25

## 2020-02-26 RX ADMIN — ASPIRIN SCH MG: 81 TABLET, DELAYED RELEASE ORAL at 09:02

## 2020-02-26 RX ADMIN — SODIUM CHLORIDE SCH MLS/HR: 0.9 INJECTION INTRAVENOUS at 15:44

## 2020-02-26 RX ADMIN — CARVEDILOL SCH MG: 25 TABLET, FILM COATED ORAL at 09:02

## 2020-02-26 RX ADMIN — TRAMADOL HYDROCHLORIDE PRN MG: 50 TABLET, FILM COATED ORAL at 11:36

## 2020-02-26 RX ADMIN — LOSARTAN POTASSIUM SCH MG: 50 TABLET, FILM COATED ORAL at 09:02

## 2020-02-26 RX ADMIN — TRAMADOL HYDROCHLORIDE PRN MG: 50 TABLET, FILM COATED ORAL at 20:36

## 2020-02-26 NOTE — CONSULTATION
History of Present Illness


Present Illness


Allergies:  


Coded Allergies:  


     No Known Allergies (Unverified , 9/5/19)





Medication History


Scheduled


Acetaminophen* (Tylenol Extra Strength*), 1,000 MG ORAL Q6H, (Reported)


Aspirin* (Aspir 81*), 81 MG ORAL DAILY, (Reported)


Calcium Carbonate/Vitamin D3 (Calcium 600 + Vit D 200 Tablet), 1 EACH PO BID, (

Reported)


Carvedilol* (Carvedilol*), 25 MG ORAL BID, (Reported)


Ceftriaxone Sod (Ceftriaxone 2 gm-D5w Bag), 2 GM IV DAILY, (Reported)


Chlorthalidone* (Chlorthalidone*), 25 MG ORAL DAILY, (Reported)


Furosemide* (Lasix*), 20 MG ORAL BID, (Reported)


Losartan Potassium* (Losartan Potassium*), 50 MG ORAL DAILY, (Reported)


Tramadol Hcl (Tramadol Hcl), 50 MG ORAL EVERY 4 HOURS, (Reported)





Scheduled PRN


Ibuprofen* (Advil*), 200 MG ORAL Q6H PRN for Pain Scale (6-10), (Reported)





Patient History


Healthcare decision maker





Resuscitation status


Full Code


Advanced Directive on File


No





Physical Exam





Last 24 Hour Vital Signs








  Date Time  Temp Pulse Resp B/P (MAP) Pulse Ox O2 Delivery O2 Flow Rate FiO2


 


2/26/20 09:02    145/57    


 


2/26/20 09:02  80  145/57    


 


2/26/20 08:00 98.2 80 18 145/57 (86) 97   


 


2/26/20 04:00 97.5 79 19 147/56 (86) 97   


 


2/26/20 00:09 97.0 87 20 154/70 (98) 97   


 


2/25/20 23:05 97.9       


 


2/25/20 21:00      Room Air  


 


2/25/20 20:15      Room Air  


 


2/25/20 20:00 97.9 84 21 154/71 (98) 95   

















Intake and Output  


 


 2/25/20 2/26/20





 19:00 07:00


 


Intake Total  275 ml


 


Output Total  0 ml


 


Balance  275 ml


 


  


 


IV Total  275 ml


 


Output Urine Total  0 ml











Laboratory Tests








Test


  2/26/20


05:35


 


White Blood Count


  8.9 K/UL


(4.8-10.8)


 


Red Blood Count


  3.46 M/UL


(4.20-5.40)  L


 


Hemoglobin


  10.1 G/DL


(12.0-16.0)  L


 


Hematocrit


  29.7 %


(37.0-47.0)  L


 


Mean Corpuscular Volume 86 FL (80-99)  


 


Mean Corpuscular Hemoglobin


  29.2 PG


(27.0-31.0)


 


Mean Corpuscular Hemoglobin


Concent 34.1 G/DL


(32.0-36.0)


 


Red Cell Distribution Width


  12.6 %


(11.6-14.8)


 


Platelet Count


  196 K/UL


(150-450)


 


Mean Platelet Volume


  6.4 FL


(6.5-10.1)  L


 


Neutrophils (%) (Auto)


  73.5 %


(45.0-75.0)


 


Lymphocytes (%) (Auto)


  15.1 %


(20.0-45.0)  L


 


Monocytes (%) (Auto)


  6.9 %


(1.0-10.0)


 


Eosinophils (%) (Auto)


  3.9 %


(0.0-3.0)  H


 


Basophils (%) (Auto)


  0.6 %


(0.0-2.0)


 


Sodium Level


  145 MMOL/L


(136-145)


 


Potassium Level


  3.6 MMOL/L


(3.5-5.1)


 


Chloride Level


  108 MMOL/L


()  H


 


Carbon Dioxide Level


  30 MMOL/L


(21-32)


 


Anion Gap


  8 mmol/L


(5-15)


 


Blood Urea Nitrogen


  10 mg/dL


(7-18)


 


Creatinine


  0.9 MG/DL


(0.55-1.30)


 


Estimat Glomerular Filtration


Rate > 60 mL/min


(>60)


 


Glucose Level


  96 MG/DL


()


 


Calcium Level


  9.4 MG/DL


(8.5-10.1)








Height (Feet):  5


Height (Inches):  2.00


Weight (Pounds):  227


Medications





Current Medications








 Medications


  (Trade)  Dose


 Ordered  Sig/Imelda


 Route


 PRN Reason  Start Time


 Stop Time Status Last Admin


Dose Admin


 


 Acetaminophen


  (Tylenol)  1,000 mg  Q6H  PRN


 ORAL


 Mild Pain/Temp > 100.5  2/25/20 22:00


 3/26/20 21:59  2/25/20 22:33


 


 


 Aspirin


  (Ecotrin)  81 mg  DAILY


 ORAL


   2/26/20 09:00


 3/27/20 08:59  2/26/20 09:02


 


 


 Carvedilol


  (Coreg)  25 mg  BID


 ORAL


   2/26/20 09:00


 3/27/20 08:59  2/26/20 09:02


 


 


 Ceftriaxone


 Sodium 1 gm/


 Dextrose  55 ml @ 


 110 mls/hr  Q24H


 IVPB


   2/26/20 15:00


 3/4/20 14:59   


 


 


 Furosemide


  (Lasix)  40 mg  DAILY


 IV


   2/26/20 09:00


 3/27/20 08:59  2/26/20 09:02


 


 


 Heparin Sodium


  (Porcine)


  (Heparin 5000


 units/ml)  5,000 units  EVERY 12  HOURS


 SUBQ


   2/26/20 09:00


 3/27/20 08:59  2/26/20 09:03


 


 


 Lorazepam


  (Ativan)  1 mg  Q6H  PRN


 ORAL


 For Anxiety  2/25/20 22:00


 3/3/20 21:59   


 


 


 Losartan Potassium


  (Cozaar)  50 mg  DAILY


 ORAL


   2/26/20 09:00


 3/27/20 08:59  2/26/20 09:02


 


 


 Tramadol HCl


  (Ultram)  50 mg  Q6H  PRN


 ORAL


 Severe Pain (Pain Scale 7-10)  2/25/20 22:00


 3/3/20 21:59  2/26/20 11:36


 


 


 Vancomycin HCl


  (Vanco rx to


 dose)  1 ea  DAILY  PRN


 MISC


 Per rx protocol  2/25/20 22:00


 3/26/20 21:59   


 


 


 Vancomycin/Sodium


 Chloride  275 ml @ 


 184 mls/hr  Q24H


 IVPB


   2/26/20 00:00


 3/2/20 00:00  2/25/20 23:29


 











Assessment/Plan


Problem List:  


(1) Cellulitis


ICD Codes:  L03.90 - Cellulitis, unspecified


SNOMED:  167854242


(2) Severe anemia


ICD Codes:  D64.9 - Anemia, unspecified


SNOMED:  958614077


(3) Peripheral edema


ICD Codes:  R60.9 - Edema, unspecified


SNOMED:  595079565


(4) Elephantiasis


ICD Codes:  I89.0 - Lymphedema, not elsewhere classified


SNOMED:  071206163


(5) Mild pulmonary hypertension


ICD Codes:  I27.20 - Pulmonary hypertension, unspecified


SNOMED:  14701603


(6) Morbid obesity


ICD Codes:  E66.01 - Morbid (severe) obesity due to excess calories


SNOMED:  513879306


(7) History of hypertension


ICD Codes:  Z86.79 - Personal history of other diseases of the circulatory 

system


SNOMED:  643276157


Assessment/Plan:


wound care


iv abx


Podiatry to see


monitor BP


symptomatic treatment


anemia w/u including stool for OB and iron studies. 


check electrolytes


dvt prophylaxis.











Aleisha Lagunas MD Feb 26, 2020 13:29

## 2020-02-26 NOTE — HISTORY & PHYSICAL
History and Physical


History & Physicial


Dictated for Int Med-Dr Yarbrough no. [id number].











Sixto Hardy MD Feb 26, 2020 12:03

## 2020-02-26 NOTE — NUR
NURSE NOTES:

Received report from Betty WOLFF. Patient is awake and oriented, no acute distress noted, IV 
intact, patent. Bilateral legs swollen, elevated for comfort. Needs met at this time. Call 
light within reach.

## 2020-02-26 NOTE — NUR
CASE MANAGEMENT:INITIAL REVIEW



85 YR OLD FEMALE TRANSFERRED FROM [place] 



CC;BLE EDEMA



SI;BLE CELLULITIS. BLE LYMPHEDEMA.  BLE PAIN.  HTN.

97.0   87  20   154/70   97% ON RA

H/H 10.1/29.7   



IS;VANCO IV

TRAMADOL PO



****ADMITTED TO MED SURG

*****MED SURG STATUS



DCP;FROM HOME

## 2020-02-26 NOTE — NUR
NURSE NOTES:



Received patient awake, alert, verbal, resting in bed comfortably without complaints, 
visitor at bedside.

## 2020-02-26 NOTE — NUR
CHARGE NURSE NOTE:

Pt has a Left femoral to superf. femoral - Acute DVT.  was called, message left.

## 2020-02-26 NOTE — HISTORY AND PHYSICAL REPORT
DATE OF ADMISSION:  02/25/2020

CHIEF COMPLAINT:  The patient is an 85-year-old  female,

who presents with a chief complaint of bilateral leg pain and swelling.



HISTORY OF PRESENT ILLNESS:  The patient was admitted to [facility] in September 2019, for bilateral leg swelling.  The patient was

diagnosed with chronic lymphedema of bilateral lower extremities.



The patient states her legs have become increasingly swollen over the

last two weeks.  The patient has increasing pain in the bilateral knees.

Pain radiates to the bilateral calves.  The patient states the pain is

_____ bed.  She has been unable to walk for last two weeks.



The patient was initially transported to [facility] emergency room

via EMS.  The patient is transferred to [facility] for

insurance purposes.  The patient is admitted with bilateral lower

extremity edema and bilateral lower extremity cellulitis.



REVIEW OF SYSTEMS:  CONSTITUTIONAL:  The patient denies weight loss or

gain.  The patient denies fevers or chills.  HEENT:  The patient denies

ear or throat pain.  The patient denies headache.  CARDIOVASCULAR:  The

patient denies palpitations or chest pain.    CHEST:  The patient denies

wheeze or shortness of breath.    ABDOMEN:  The patient denies nausea,

vomiting, diarrhea, constipation.    GENITOURINARY:  The patient denies

dysuria or increased frequency of urination.    NEUROMUSCULAR:  The

patient denies seizures or generalized weakness.  The patient has 2+

pitting edema in bilateral lower extremities, left greater than right.



PAST MEDICAL HISTORY:  Significant for hypertension.



PAST SURGICAL HISTORY:  Significant for appendectomy.



CURRENT MEDICATIONS:

1. Ibuprofen 200 mg one tablet p.o. q.6 hours. p.r.n.

2. Aspirin 81 mg p.o. daily.

3. Cozaar 50 mg p.o. daily.

4. Carvedilol 25 mg p.o. twice daily.

5. Tramadol 50 mg one tablet p.o. q.6 hours. p.r.n.



ALLERGIES:  No known drug allergies.



SOCIAL HISTORY:  The patient is single and lives alone.  The patient denies

tobacco or alcohol use.



PHYSICAL EXAMINATION:

VITAL SIGNS:  Temperature 98.1, respirations 18, pulse 81, blood pressure,

162/61.

GENERAL:  The patient is a well-developed, well-nourished 

female, in no apparent distress.

HEENT:  Eyes, pupils equal and responsive to light and accommodation.

Extraocular movements are intact.

NECK:  Supple without lymphadenopathy.

CHEST:  Lungs are clear to auscultation bilaterally without wheezes or

rales.

CARDIOVASCULAR:  Regular rhythm and rate.  S1, S2 normal without murmurs,

rubs, or gallops.

ABDOMEN:  Soft, nontender, and nondistended.  Positive bowel sounds.  No

evidence of hepatosplenomegaly.  Currently, no rebound or guarding.

EXTREMITIES:  A 2+ pitting edema from the ankles to the knees bilaterally

with erythema of the left anterior calf from the ankle to the knee, left

greater than right.



LABORATORY STUDIES:  WBC 8.7, hemoglobin 10.5, hematocrit 33.3, platelets

_____.  Sodium 144, potassium 4.7, chloride 109, CO2 25, BUN 10,

creatinine 0.95, glucose 105.  X-ray of the bilateral knees failed to

demonstrate acute fracture.



ASSESSMENT:  This is an 85-year-old  female:



1. Cellulitis of bilateral lower extremities.

2. Lymphedema bilateral lower extremities.

3. Bilateral leg pain.

4. Hypertension.



TREATMENT:

1. Cellulitis of the bilateral lower extremities.  The patient has been

started empirically on intravenous vancomycin and ceftriaxone.  An

Infectious Disease consultation has been obtained with Dr. Elizabeth.  We

will follow recommendations of Infectious Disease.

2. Hypertension.  Continue Cozaar and Coreg as above.









  ______________________________________________

  Sixto Hardy M.D.





DR:  MICK

D:  02/26/2020 12:03

T:  02/27/2020 04:06

JOB#:  [id number]

CC:

## 2020-02-27 VITALS — SYSTOLIC BLOOD PRESSURE: 125 MMHG | DIASTOLIC BLOOD PRESSURE: 68 MMHG

## 2020-02-27 VITALS — SYSTOLIC BLOOD PRESSURE: 136 MMHG | DIASTOLIC BLOOD PRESSURE: 66 MMHG

## 2020-02-27 VITALS — DIASTOLIC BLOOD PRESSURE: 61 MMHG | SYSTOLIC BLOOD PRESSURE: 135 MMHG

## 2020-02-27 VITALS — DIASTOLIC BLOOD PRESSURE: 73 MMHG | SYSTOLIC BLOOD PRESSURE: 147 MMHG

## 2020-02-27 VITALS — DIASTOLIC BLOOD PRESSURE: 71 MMHG | SYSTOLIC BLOOD PRESSURE: 122 MMHG

## 2020-02-27 VITALS — SYSTOLIC BLOOD PRESSURE: 123 MMHG | DIASTOLIC BLOOD PRESSURE: 52 MMHG

## 2020-02-27 LAB
% IRON SATURATION: 17 % (ref 15–50)
ADD MANUAL DIFF: NO
ANION GAP SERPL CALC-SCNC: 7 MMOL/L (ref 5–15)
APTT BLD: 39 SEC (ref 23–33)
BASOPHILS NFR BLD AUTO: 0.6 % (ref 0–2)
BUN SERPL-MCNC: 13 MG/DL (ref 7–18)
CALCIUM SERPL-MCNC: 8.9 MG/DL (ref 8.5–10.1)
CHLORIDE SERPL-SCNC: 107 MMOL/L (ref 98–107)
CO2 SERPL-SCNC: 31 MMOL/L (ref 21–32)
CREAT SERPL-MCNC: 1 MG/DL (ref 0.55–1.3)
EOSINOPHIL NFR BLD AUTO: 4.5 % (ref 0–3)
ERYTHROCYTE [DISTWIDTH] IN BLOOD BY AUTOMATED COUNT: 12.8 % (ref 11.6–14.8)
HCT VFR BLD CALC: 29.8 % (ref 37–47)
HGB BLD-MCNC: 10 G/DL (ref 12–16)
INR PPP: 1.2 (ref 0.9–1.1)
IRON SERPL-MCNC: 33 UG/DL (ref 50–175)
LDH SERPL L TO P-CCNC: 147 U/L (ref 81–234)
LYMPHOCYTES NFR BLD AUTO: 17.3 % (ref 20–45)
MCV RBC AUTO: 85 FL (ref 80–99)
MONOCYTES NFR BLD AUTO: 7.3 % (ref 1–10)
NEUTROPHILS NFR BLD AUTO: 70.3 % (ref 45–75)
PLATELET # BLD: 191 K/UL (ref 150–450)
POTASSIUM SERPL-SCNC: 3.5 MMOL/L (ref 3.5–5.1)
RBC # BLD AUTO: 3.5 M/UL (ref 4.2–5.4)
SODIUM SERPL-SCNC: 145 MMOL/L (ref 136–145)
TIBC SERPL-MCNC: 200 UG/DL (ref 250–450)
UNSATURATED IRON BINDING: 167 UG/DL (ref 112–346)
WBC # BLD AUTO: 8.7 K/UL (ref 4.8–10.8)

## 2020-02-27 RX ADMIN — SODIUM CHLORIDE SCH MLS/HR: 0.9 INJECTION INTRAVENOUS at 15:38

## 2020-02-27 RX ADMIN — RIVAROXABAN SCH MG: 15 TABLET, FILM COATED ORAL at 18:09

## 2020-02-27 RX ADMIN — CARVEDILOL SCH MG: 25 TABLET, FILM COATED ORAL at 18:09

## 2020-02-27 RX ADMIN — Medication SCH MLS/HR: at 23:42

## 2020-02-27 RX ADMIN — CARVEDILOL SCH MG: 25 TABLET, FILM COATED ORAL at 08:53

## 2020-02-27 RX ADMIN — LOSARTAN POTASSIUM SCH MG: 50 TABLET, FILM COATED ORAL at 08:54

## 2020-02-27 RX ADMIN — ASPIRIN SCH MG: 81 TABLET, DELAYED RELEASE ORAL at 08:57

## 2020-02-27 RX ADMIN — Medication SCH MLS/HR: at 00:29

## 2020-02-27 RX ADMIN — RIVAROXABAN SCH MG: 15 TABLET, FILM COATED ORAL at 08:54

## 2020-02-27 RX ADMIN — TRAMADOL HYDROCHLORIDE PRN MG: 50 TABLET, FILM COATED ORAL at 06:25

## 2020-02-27 NOTE — NUR
NURSE NOTES:

seen by Matteo Driver for both lower legs cellulitis, edema, bilat heels. order of 
elevate both heels with pillow. prevalon boots but not using boots in the hospital anymore. 
order noted and carried out.

## 2020-02-27 NOTE — PULMONOLOGY PROGRESS NOTE
Assessment/Plan


Problems:  


(1) Cellulitis


(2) Severe anemia


(3) Peripheral edema


(4) Elephantiasis


(5) Mild pulmonary hypertension


(6) Morbid obesity


(7) History of hypertension


Assessment/Plan


wound care,


iv abx


podiatry


monitor BP


f/u stool for OB


symptomatic treatment.





Subjective


ROS Limited/Unobtainable:  No


Constitutional:  Reports: no symptoms


HEENT:  Repors: no symptoms


Respiratory:  Reports: no symptoms


Allergies:  


Coded Allergies:  


     No Known Allergies (Unverified , 9/5/19)





Objective





Last 24 Hour Vital Signs








  Date Time  Temp Pulse Resp B/P (MAP) Pulse Ox O2 Delivery O2 Flow Rate FiO2


 


2/27/20 12:00 97.8 80 20 125/68 (87) 98   


 


2/27/20 09:00      Room Air  


 


2/27/20 08:54    136/66    


 


2/27/20 08:53  87  136/66    


 


2/27/20 08:00 97.9 87 19 136/66 (89) 94   


 


2/27/20 04:00 98.2 82 18 147/73 (97) 95   


 


2/27/20 00:00 98.1 84 18 122/71 (88) 93   


 


2/26/20 21:10 97.9       


 


2/26/20 20:15      Room Air  


 


2/26/20 20:06 97.9 79 18 132/60 (84) 94   


 


2/26/20 18:25  80  144/66    


 


2/26/20 16:00 97.9  18 144/66 (92) 97   

















Intake and Output  


 


 2/26/20 2/27/20





 19:00 07:00


 


Intake Total 480 ml 2026 ml


 


Output Total 1800 ml 700 ml


 


Balance -1320 ml 1326 ml


 


  


 


Intake Oral 480 ml 


 


IV Total  2026 ml


 


Output Urine Total 1800 ml 700 ml


 


# Voids 2 


 


# Bowel Movements  1








General Appearance:  WD/WN


HEENT:  normocephalic, atraumatic


Respiratory/Chest:  chest wall non-tender, lungs clear


Breasts:  no masses


Cardiovascular:  normal peripheral pulses


Abdomen:  normal bowel sounds


Extremities:  no clubbing


Neurologic/Psychiatric:  CNs II-XII grossly normal


Laboratory Tests


2/27/20 04:42: 


White Blood Count 8.7, Red Blood Count 3.50L, Hemoglobin 10.0L, Hematocrit 29.8L

, Mean Corpuscular Volume 85, Mean Corpuscular Hemoglobin 28.6, Mean 

Corpuscular Hemoglobin Concent 33.5, Red Cell Distribution Width 12.8, Platelet 

Count 191, Mean Platelet Volume 6.4L, Neutrophils (%) (Auto) 70.3, Lymphocytes (

%) (Auto) 17.3L, Monocytes (%) (Auto) 7.3, Eosinophils (%) (Auto) 4.5H, 

Basophils (%) (Auto) 0.6, Differential Total Cells Counted 100, Neutrophils % (

Manual) 76H, Lymphocytes % (Manual) 15L, Monocytes % (Manual) 5, Eosinophils % (

Manual) 4H, Basophils % (Manual) 0, Band Neutrophils 0, Other Cell Type 

Pathologist review, Platelet Estimate Adequate, Platelet Morphology Normal, Red 

Blood Cell Morphology Normal, Erythrocyte Sedimentation Rate 50H, Reticulocyte 

Count 0.4L, Prothrombin Time 12.5H, Prothromb Time International Ratio 1.2H, 

Activated Partial Thromboplast Time 39H, Sodium Level 145, Potassium Level 3.5, 

Chloride Level 107, Carbon Dioxide Level 31, Anion Gap 7, Blood Urea Nitrogen 13

, Creatinine 1.0, Estimat Glomerular Filtration Rate > 60, Glucose Level 105, 

Calcium Level 8.9, Iron Level 33L, Total Iron Binding Capacity 200L, Percent 

Iron Saturation 17, Unsaturated Iron Binding 167, Lactate Dehydrogenase 147, Pro

-B-Type Natriuretic Peptide 684H, Carcinoembryonic Antigen [Pending], Vitamin 

B12 Level 279, Folate 6.7L


2/27/20 11:00: Stool Occult Blood [Pending]





Current Medications








 Medications


  (Trade)  Dose


 Ordered  Sig/Imelda


 Route


 PRN Reason  Start Time


 Stop Time Status Last Admin


Dose Admin


 


 Acetaminophen


  (Tylenol)  1,000 mg  Q6H  PRN


 ORAL


 Mild Pain/Temp > 100.5  2/25/20 22:00


 3/26/20 21:59  2/25/20 22:33


 


 


 Aspirin


  (Ecotrin)  81 mg  DAILY


 ORAL


   2/26/20 09:00


 3/27/20 08:59  2/27/20 08:57


 


 


 Carvedilol


  (Coreg)  25 mg  BID


 ORAL


   2/26/20 09:00


 3/27/20 08:59  2/27/20 08:53


 


 


 Ceftriaxone


 Sodium 1 gm/


 Dextrose  55 ml @ 


 110 mls/hr  Q24H


 IVPB


   2/26/20 15:00


 3/4/20 14:59  2/26/20 15:44


 


 


 Furosemide


  (Lasix)  40 mg  DAILY


 IV


   2/26/20 09:00


 3/27/20 08:59  2/27/20 08:53


 


 


 Lorazepam


  (Ativan)  1 mg  Q6H  PRN


 ORAL


 For Anxiety  2/25/20 22:00


 3/3/20 21:59   


 


 


 Losartan Potassium


  (Cozaar)  50 mg  DAILY


 ORAL


   2/26/20 09:00


 3/27/20 08:59  2/27/20 08:54


 


 


 Rivaroxaban


  (Xarelto)  15 mg  BID


 ORAL


   2/26/20 18:00


 3/18/20 09:01  2/27/20 08:54


 


 


 Rivaroxaban


  (Xarelto)  20 mg  DAILY


 ORAL


   3/19/20 09:00


 4/18/20 08:59   


 


 


 Tramadol HCl


  (Ultram)  50 mg  Q6H  PRN


 ORAL


 Severe Pain (Pain Scale 7-10)  2/25/20 22:00


 3/3/20 21:59  2/27/20 06:25


 


 


 Vancomycin HCl


  (Vanco rx to


 dose)  1 ea  DAILY  PRN


 MISC


 Per rx protocol  2/25/20 22:00


 3/26/20 21:59   


 


 


 Vancomycin/Sodium


 Chloride  275 ml @ 


 184 mls/hr  Q24H


 IVPB


   2/26/20 00:00


 3/2/20 00:00  2/27/20 00:29


 

















Aleisha Lagunas MD Feb 27, 2020 14:55

## 2020-02-27 NOTE — NUR
NURSE NOTES:

patient in bed, and awake. respiration is even and unlabored. Denies any pain and discomfort 
on BLE. encouraged patient to elevate BLE due to edema. patient is compliant. No acute 
distress noted on patient at this time. call light is within reach. will continue to follow 
plan of care.

## 2020-02-27 NOTE — CONSULTATION
History of Present Illness


General


Date patient seen:  Feb 27, 2020


Time patient seen:  09:00


Referring physician:  Dr. Lagunas


Reason for Consultation:  B/L L/E edema and heel DTI





Present Illness


HPI


Pt seen bedside for B/L L/E edema and heel DTI. Pt resting at bedside, denies 

any open wounds. Pt relates no acute SOI. Relates moderate pain to B/L Heel.


Allergies:  


Coded Allergies:  


     No Known Allergies (Unverified , 9/5/19)





Medication History


Scheduled


Acetaminophen* (Tylenol Extra Strength*), 1,000 MG ORAL Q6H, (Reported)


Aspirin* (Aspir 81*), 81 MG ORAL DAILY, (Reported)


Calcium Carbonate/Vitamin D3 (Calcium 600 + Vit D 200 Tablet), 1 EACH PO BID, (

Reported)


Carvedilol* (Carvedilol*), 25 MG ORAL BID, (Reported)


Ceftriaxone Sod (Ceftriaxone 2 gm-D5w Bag), 2 GM IV DAILY, (Reported)


Chlorthalidone* (Chlorthalidone*), 25 MG ORAL DAILY, (Reported)


Furosemide* (Lasix*), 20 MG ORAL BID, (Reported)


Losartan Potassium* (Losartan Potassium*), 50 MG ORAL DAILY, (Reported)


Tramadol Hcl (Tramadol Hcl), 50 MG ORAL EVERY 4 HOURS, (Reported)





Scheduled PRN


Ibuprofen* (Advil*), 200 MG ORAL Q6H PRN for Pain Scale (6-10), (Reported)





Patient History


Healthcare decision maker





Resuscitation status


Full Code


Advanced Directive on File


No





Physical Exam


Physical Exam Narrative


Focused B/L L/E Exam:





Derm:


B/L LE: Pitting edema noted distal to tibial tuberosity . Increased 

hyperpigmentation Ant tibia. Plantar heel blanchable , no open wounds noted. (+

) POP 


Vasc: 1/4 dp/pt pulses, CRT < 3 seconds 


Neuro: SILT intact 


MSK: MS/ROM deferred secondary to pain.





Last 24 Hour Vital Signs








  Date Time  Temp Pulse Resp B/P (MAP) Pulse Ox O2 Delivery O2 Flow Rate FiO2


 


2/27/20 08:54    136/66    


 


2/27/20 08:53  87  136/66    


 


2/27/20 08:00 97.9 87 19 136/66 (89) 94   


 


2/27/20 04:00 98.2 82 18 147/73 (97) 95   


 


2/27/20 00:00 98.1 84 18 122/71 (88) 93   


 


2/26/20 21:10 97.9       


 


2/26/20 20:15      Room Air  


 


2/26/20 20:06 97.9 79 18 132/60 (84) 94   


 


2/26/20 18:25  80  144/66    


 


2/26/20 16:00 97.9  18 144/66 (92) 97   

















Intake and Output  


 


 2/26/20 2/27/20





 19:00 07:00


 


Intake Total 480 ml 2026 ml


 


Output Total 1800 ml 700 ml


 


Balance -1320 ml 1326 ml


 


  


 


Intake Oral 480 ml 


 


IV Total  2026 ml


 


Output Urine Total 1800 ml 700 ml


 


# Voids 2 


 


# Bowel Movements  1











Laboratory Tests








Test


  2/27/20


04:42


 


White Blood Count


  8.7 K/UL


(4.8-10.8)


 


Red Blood Count


  3.50 M/UL


(4.20-5.40)  L


 


Hemoglobin


  10.0 G/DL


(12.0-16.0)  L


 


Hematocrit


  29.8 %


(37.0-47.0)  L


 


Mean Corpuscular Volume 85 FL (80-99)  


 


Mean Corpuscular Hemoglobin


  28.6 PG


(27.0-31.0)


 


Mean Corpuscular Hemoglobin


Concent 33.5 G/DL


(32.0-36.0)


 


Red Cell Distribution Width


  12.8 %


(11.6-14.8)


 


Platelet Count


  191 K/UL


(150-450)


 


Mean Platelet Volume


  6.4 FL


(6.5-10.1)  L


 


Neutrophils (%) (Auto)


  70.3 %


(45.0-75.0)


 


Lymphocytes (%) (Auto)


  17.3 %


(20.0-45.0)  L


 


Monocytes (%) (Auto)


  7.3 %


(1.0-10.0)


 


Eosinophils (%) (Auto)


  4.5 %


(0.0-3.0)  H


 


Basophils (%) (Auto)


  0.6 %


(0.0-2.0)


 


Neutrophils % (Manual) Pending  


 


Lymphocytes % (Manual) Pending  


 


Platelet Estimate Pending  


 


Platelet Morphology Pending  


 


Erythrocyte Sedimentation Rate Pending  


 


Reticulocyte Count Pending  


 


Prothrombin Time


  12.5 SEC


(9.30-11.50)  H


 


Prothromb Time International


Ratio 1.2 (0.9-1.1)


H


 


Activated Partial


Thromboplast Time 39 SEC (23-33)


H


 


Sodium Level


  145 MMOL/L


(136-145)


 


Potassium Level


  3.5 MMOL/L


(3.5-5.1)


 


Chloride Level


  107 MMOL/L


()


 


Carbon Dioxide Level


  31 MMOL/L


(21-32)


 


Anion Gap


  7 mmol/L


(5-15)


 


Blood Urea Nitrogen


  13 mg/dL


(7-18)


 


Creatinine


  1.0 MG/DL


(0.55-1.30)


 


Estimat Glomerular Filtration


Rate > 60 mL/min


(>60)


 


Glucose Level


  105 MG/DL


()


 


Calcium Level


  8.9 MG/DL


(8.5-10.1)


 


Iron Level


  33 ug/dL


()  L


 


Total Iron Binding Capacity


  200 ug/dL


(250-450)  L


 


Percent Iron Saturation 17 % (15-50)  


 


Unsaturated Iron Binding


  167 ug/dL


(112-346)


 


Lactate Dehydrogenase


  147 U/L


()


 


Pro-B-Type Natriuretic Peptide


  684 pg/mL


(0-125)  H


 


Carcinoembryonic Antigen Pending  


 


Vitamin B12 Level


  279 PG/ML


(193-986)


 


Folate


  6.7 NG/ML


(8.6-58.9)  L








Height (Feet):  5


Height (Inches):  2.00


Weight (Pounds):  227


Medications





Current Medications








 Medications


  (Trade)  Dose


 Ordered  Sig/Imelda


 Route


 PRN Reason  Start Time


 Stop Time Status Last Admin


Dose Admin


 


 Acetaminophen


  (Tylenol)  1,000 mg  Q6H  PRN


 ORAL


 Mild Pain/Temp > 100.5  2/25/20 22:00


 3/26/20 21:59  2/25/20 22:33


 


 


 Aspirin


  (Ecotrin)  81 mg  DAILY


 ORAL


   2/26/20 09:00


 3/27/20 08:59  2/27/20 08:57


 


 


 Carvedilol


  (Coreg)  25 mg  BID


 ORAL


   2/26/20 09:00


 3/27/20 08:59  2/27/20 08:53


 


 


 Ceftriaxone


 Sodium 1 gm/


 Dextrose  55 ml @ 


 110 mls/hr  Q24H


 IVPB


   2/26/20 15:00


 3/4/20 14:59  2/26/20 15:44


 


 


 Furosemide


  (Lasix)  40 mg  DAILY


 IV


   2/26/20 09:00


 3/27/20 08:59  2/27/20 08:53


 


 


 Lorazepam


  (Ativan)  1 mg  Q6H  PRN


 ORAL


 For Anxiety  2/25/20 22:00


 3/3/20 21:59   


 


 


 Losartan Potassium


  (Cozaar)  50 mg  DAILY


 ORAL


   2/26/20 09:00


 3/27/20 08:59  2/27/20 08:54


 


 


 Rivaroxaban


  (Xarelto)  15 mg  BID


 ORAL


   2/26/20 18:00


 3/18/20 09:01  2/27/20 08:54


 


 


 Rivaroxaban


  (Xarelto)  20 mg  DAILY


 ORAL


   3/19/20 09:00


 4/18/20 08:59   


 


 


 Tramadol HCl


  (Ultram)  50 mg  Q6H  PRN


 ORAL


 Severe Pain (Pain Scale 7-10)  2/25/20 22:00


 3/3/20 21:59  2/27/20 06:25


 


 


 Vancomycin HCl


  (Vanco rx to


 dose)  1 ea  DAILY  PRN


 MISC


 Per rx protocol  2/25/20 22:00


 3/26/20 21:59   


 


 


 Vancomycin/Sodium


 Chloride  275 ml @ 


 184 mls/hr  Q24H


 IVPB


   2/26/20 00:00


 3/2/20 00:00  2/27/20 00:29


 











Assessment/Plan


Assessment/Plan:


A: B/L L/E edema, heel DTI Stage 1


Edema


HTN


Obesity


Anemia


Acute DVT L femoral Vein 








P:


Pt seen and evaluated 


Discuss findings with patient 


Venous Doppler confirmed DVT L femoral Vein, DVT Tx per primary team


Rec off-loading measures to B/L L/E, ABD pad x 2 heel and Provlon boots. 


Cont Tx per specialists


No acute surgical intervention required at this time. 


Podiatry will cont to monitor.











Phan Little DPM Feb 27, 2020 09:15

## 2020-02-27 NOTE — CONSULTATION
DATE OF CONSULTATION:  02/27/2020

INFECTIOUS DISEASES CONSULTATION



CONSULTING PHYSICIAN:  Jorge Elizabeth M.D.



REFERRING PHYSICIAN:  Sixto Hardy M.D.



REASON FOR CONSULTATION:  Evaluation of patient for cellulitis of lower

extremity.



HISTORY OF PRESENT ILLNESS:  The patient is an 85-year-old female with

multiple medical problems as listed below, who was admitted to this

medical center due to lower extremity edema and erythema.  Infectious

Diseases consultation has been requested for further evaluation of the

patient's antibiotic management.



PAST MEDICAL HISTORY:  Significant for:



1. Hypertension.

2. Bilateral lymphedema.



ALLERGIES:  No known drug allergies.



SOCIAL HISTORY:  Negative for alcohol and drug abuse.



FAMILY HISTORY:  Noncontributory.



MEDICATIONS:  Rocephin, vancomycin.



REVIEW OF SYSTEMS:  A 10-point review of systems was done and except what

has been mentioned above has been negative.



PHYSICAL EXAMINATION:

VITAL SIGNS:  Temperature 98, blood pressure 125/68, pulse 80, respiratory

rate 20.

HEENT:  No pale conjunctivae.  No icterus.

NECK:  No lymphadenopathy.

CHEST:  Clear.

HEART:  S1 and S2.

ABDOMEN:  Soft.

EXTREMITIES:  The patient has bilateral lower extremity edema, mild

erythema and lymphedema.

NEUROLOGIC:  Awake and alert.



LABORATORY AND DIAGNOSTIC DATA:  White blood cells 8.7, hemoglobin 10,

platelet 191.  BUN 13, creatinine 1.  Venous Doppler showed acute DVT in

______ common vein and superficial vein.



ASSESSMENT:  The patient is an 85-year-old female with ______ lower

extremity lymphedema/cellulitis.



1. Left lower extremity edema.

2. Normal white blood cells and afebrile.



PLAN:

1. We will continue the patient on IV vancomycin.

2. Hold Rocephin.

3. Monitor CBC.

4. Monitor BMP.

5. We will send blood cultures.



Based on the patient's clinical course and labs, we will do further

recommendations.



Thank you, Dr. Hardy, for allowing me to participate in the care of this

patient.  I will follow the patient with you during hospitalization.









  ______________________________________________

  Jorge Elizabeth M.D.





DR:  Ashley

D:  02/27/2020 16:25

T:  02/27/2020 20:02

JOB#:  [id number]

CC:

## 2020-02-27 NOTE — INFECTIOUS DISEASES PROG NOTE
Subjective


Allergies:  


Coded Allergies:  


     No Known Allergies (Unverified , 9/5/19)


Subjective


#  [id number]





Objective


Vital Signs





Last 24 Hour Vital Signs








  Date Time  Temp Pulse Resp B/P (MAP) Pulse Ox O2 Delivery O2 Flow Rate FiO2


 


2/27/20 12:00 97.8 80 20 125/68 (87) 98   


 


2/27/20 09:00      Room Air  


 


2/27/20 08:54    136/66    


 


2/27/20 08:53  87  136/66    


 


2/27/20 08:00 97.9 87 19 136/66 (89) 94   


 


2/27/20 04:00 98.2 82 18 147/73 (97) 95   


 


2/27/20 00:00 98.1 84 18 122/71 (88) 93   


 


2/26/20 21:10 97.9       


 


2/26/20 20:15      Room Air  


 


2/26/20 20:06 97.9 79 18 132/60 (84) 94   


 


2/26/20 18:25  80  144/66    


 


2/26/20 16:00 97.9  18 144/66 (92) 97   








Height (Feet):  5


Height (Inches):  2.00


Weight (Pounds):  227





Laboratory Tests








Test


  2/27/20


04:42 2/27/20


11:00


 


White Blood Count


  8.7 K/UL


(4.8-10.8) 


 


 


Red Blood Count


  3.50 M/UL


(4.20-5.40)  L 


 


 


Hemoglobin


  10.0 G/DL


(12.0-16.0)  L 


 


 


Hematocrit


  29.8 %


(37.0-47.0)  L 


 


 


Mean Corpuscular Volume 85 FL (80-99)   


 


Mean Corpuscular Hemoglobin


  28.6 PG


(27.0-31.0) 


 


 


Mean Corpuscular Hemoglobin


Concent 33.5 G/DL


(32.0-36.0) 


 


 


Red Cell Distribution Width


  12.8 %


(11.6-14.8) 


 


 


Platelet Count


  191 K/UL


(150-450) 


 


 


Mean Platelet Volume


  6.4 FL


(6.5-10.1)  L 


 


 


Neutrophils (%) (Auto)


  70.3 %


(45.0-75.0) 


 


 


Lymphocytes (%) (Auto)


  17.3 %


(20.0-45.0)  L 


 


 


Monocytes (%) (Auto)


  7.3 %


(1.0-10.0) 


 


 


Eosinophils (%) (Auto)


  4.5 %


(0.0-3.0)  H 


 


 


Basophils (%) (Auto)


  0.6 %


(0.0-2.0) 


 


 


Differential Total Cells


Counted 100  


  


 


 


Neutrophils % (Manual) 76 % (45-75)  H 


 


Lymphocytes % (Manual) 15 % (20-45)  L 


 


Monocytes % (Manual) 5 % (1-10)   


 


Eosinophils % (Manual) 4 % (0-3)  H 


 


Basophils % (Manual) 0 % (0-2)   


 


Band Neutrophils 0 % (0-8)   


 


Other Cell Type


  Pathologist


review 


 


 


Platelet Estimate Adequate   


 


Platelet Morphology Normal   


 


Red Blood Cell Morphology Normal   


 


Erythrocyte Sedimentation Rate


  50 MM/HR


(0-30)  H 


 


 


Reticulocyte Count


  0.4 %


(0.5-2.0)  L 


 


 


Prothrombin Time


  12.5 SEC


(9.30-11.50)  H 


 


 


Prothromb Time International


Ratio 1.2 (0.9-1.1)


H 


 


 


Activated Partial


Thromboplast Time 39 SEC (23-33)


H 


 


 


Sodium Level


  145 MMOL/L


(136-145) 


 


 


Potassium Level


  3.5 MMOL/L


(3.5-5.1) 


 


 


Chloride Level


  107 MMOL/L


() 


 


 


Carbon Dioxide Level


  31 MMOL/L


(21-32) 


 


 


Anion Gap


  7 mmol/L


(5-15) 


 


 


Blood Urea Nitrogen


  13 mg/dL


(7-18) 


 


 


Creatinine


  1.0 MG/DL


(0.55-1.30) 


 


 


Estimat Glomerular Filtration


Rate > 60 mL/min


(>60) 


 


 


Glucose Level


  105 MG/DL


() 


 


 


Calcium Level


  8.9 MG/DL


(8.5-10.1) 


 


 


Iron Level


  33 ug/dL


()  L 


 


 


Total Iron Binding Capacity


  200 ug/dL


(250-450)  L 


 


 


Percent Iron Saturation 17 % (15-50)   


 


Unsaturated Iron Binding


  167 ug/dL


(112-346) 


 


 


Lactate Dehydrogenase


  147 U/L


() 


 


 


Pro-B-Type Natriuretic Peptide


  684 pg/mL


(0-125)  H 


 


 


Carcinoembryonic Antigen Pending   


 


Vitamin B12 Level


  279 PG/ML


(193-986) 


 


 


Folate


  6.7 NG/ML


(8.6-58.9)  L 


 


 


Stool Occult Blood  Pending  











Current Medications








 Medications


  (Trade)  Dose


 Ordered  Sig/Imelda


 Route


 PRN Reason  Start Time


 Stop Time Status Last Admin


Dose Admin


 


 Acetaminophen


  (Tylenol)  1,000 mg  Q6H  PRN


 ORAL


 Mild Pain/Temp > 100.5  2/25/20 22:00


 3/26/20 21:59  2/25/20 22:33


 


 


 Aspirin


  (Ecotrin)  81 mg  DAILY


 ORAL


   2/26/20 09:00


 3/27/20 08:59  2/27/20 08:57


 


 


 Carvedilol


  (Coreg)  25 mg  BID


 ORAL


   2/26/20 09:00


 3/27/20 08:59  2/27/20 08:53


 


 


 Ceftriaxone


 Sodium 1 gm/


 Dextrose  55 ml @ 


 110 mls/hr  Q24H


 IVPB


   2/26/20 15:00


 3/4/20 14:59  2/26/20 15:44


 


 


 Furosemide


  (Lasix)  40 mg  DAILY


 IV


   2/26/20 09:00


 3/27/20 08:59  2/27/20 08:53


 


 


 Lorazepam


  (Ativan)  1 mg  Q6H  PRN


 ORAL


 For Anxiety  2/25/20 22:00


 3/3/20 21:59   


 


 


 Losartan Potassium


  (Cozaar)  50 mg  DAILY


 ORAL


   2/26/20 09:00


 3/27/20 08:59  2/27/20 08:54


 


 


 Rivaroxaban


  (Xarelto)  15 mg  BID


 ORAL


   2/26/20 18:00


 3/18/20 09:01  2/27/20 08:54


 


 


 Rivaroxaban


  (Xarelto)  20 mg  DAILY


 ORAL


   3/19/20 09:00


 4/18/20 08:59   


 


 


 Tramadol HCl


  (Ultram)  50 mg  Q6H  PRN


 ORAL


 Severe Pain (Pain Scale 7-10)  2/25/20 22:00


 3/3/20 21:59  2/27/20 06:25


 


 


 Vancomycin HCl


  (Vanco rx to


 dose)  1 ea  DAILY  PRN


 MISC


 Per rx protocol  2/25/20 22:00


 3/26/20 21:59   


 


 


 Vancomycin/Sodium


 Chloride  275 ml @ 


 184 mls/hr  Q24H


 IVPB


   2/26/20 00:00


 3/2/20 00:00  2/27/20 00:29


 

















Jorge Elizabeth MD Feb 27, 2020 15:02

## 2020-02-27 NOTE — INTERNAL MED PROGRESS NOTE
Subjective


Date of Service:  Feb 27, 2020


Physician Name


Sixto Hardy


Attending Physician


Chris Yarbrough MD





Current Medications








 Medications


  (Trade)  Dose


 Ordered  Sig/Imelda


 Route


 PRN Reason  Start Time


 Stop Time Status Last Admin


Dose Admin


 


 Acetaminophen


  (Tylenol)  1,000 mg  Q6H  PRN


 ORAL


 Mild Pain/Temp > 100.5  2/25/20 22:00


 3/26/20 21:59  2/25/20 22:33


 


 


 Aspirin


  (Ecotrin)  81 mg  DAILY


 ORAL


   2/26/20 09:00


 3/27/20 08:59  2/27/20 08:57


 


 


 Carvedilol


  (Coreg)  25 mg  BID


 ORAL


   2/26/20 09:00


 3/27/20 08:59  2/27/20 18:09


 


 


 Furosemide


  (Lasix)  40 mg  DAILY


 IV


   2/26/20 09:00


 3/27/20 08:59  2/27/20 08:53


 


 


 Lorazepam


  (Ativan)  1 mg  Q6H  PRN


 ORAL


 For Anxiety  2/25/20 22:00


 3/3/20 21:59   


 


 


 Losartan Potassium


  (Cozaar)  50 mg  DAILY


 ORAL


   2/26/20 09:00


 3/27/20 08:59  2/27/20 08:54


 


 


 Rivaroxaban


  (Xarelto)  15 mg  BID


 ORAL


   2/26/20 18:00


 3/18/20 09:01  2/27/20 18:09


 


 


 Rivaroxaban


  (Xarelto)  20 mg  DAILY


 ORAL


   3/19/20 09:00


 4/18/20 08:59   


 


 


 Tramadol HCl


  (Ultram)  50 mg  Q6H  PRN


 ORAL


 Severe Pain (Pain Scale 7-10)  2/25/20 22:00


 3/3/20 21:59  2/27/20 06:25


 


 


 Vancomycin HCl


  (Vanco rx to


 dose)  1 ea  DAILY  PRN


 MISC


 Per rx protocol  2/25/20 22:00


 3/26/20 21:59   


 


 


 Vancomycin/Sodium


 Chloride  275 ml @ 


 184 mls/hr  Q24H


 IVPB


   2/26/20 00:00


 3/2/20 00:00  2/27/20 00:29


 








Allergies:  


Coded Allergies:  


     No Known Allergies (Unverified , 9/5/19)


ROS Limited/Unobtainable:  No


Constitutional:  Reports: no symptoms


HEENT:  Reports: no symptoms


Cardiovascular:  Reports: no symptoms


Respiratory:  Reports: no symptoms


Gastrointestinal/Abdominal:  Reports: no symptoms


Genitourinary:  Reports: no symptoms


Neurologic/Psychiatric:  Reports: no symptoms


Subjective


86 YO F admitted with bilateral leg swelling.  Now cellulitis bilat lower 

extremities and acute deep venous thrombosis left leg.  Cover for Jules Yarbrough





Objective





Last Vital Signs








  Date Time  Temp Pulse Resp B/P (MAP) Pulse Ox O2 Delivery O2 Flow Rate FiO2


 


2/27/20 18:09  78  135/61    


 


2/27/20 16:00 97.8  20  96   


 


2/27/20 09:00      Room Air  











Laboratory Tests








Test


  2/27/20


04:42 2/27/20


11:00


 


White Blood Count


  8.7 K/UL


(4.8-10.8) 


 


 


Red Blood Count


  3.50 M/UL


(4.20-5.40)  L 


 


 


Hemoglobin


  10.0 G/DL


(12.0-16.0)  L 


 


 


Hematocrit


  29.8 %


(37.0-47.0)  L 


 


 


Mean Corpuscular Volume 85 FL (80-99)   


 


Mean Corpuscular Hemoglobin


  28.6 PG


(27.0-31.0) 


 


 


Mean Corpuscular Hemoglobin


Concent 33.5 G/DL


(32.0-36.0) 


 


 


Red Cell Distribution Width


  12.8 %


(11.6-14.8) 


 


 


Platelet Count


  191 K/UL


(150-450) 


 


 


Mean Platelet Volume


  6.4 FL


(6.5-10.1)  L 


 


 


Neutrophils (%) (Auto)


  70.3 %


(45.0-75.0) 


 


 


Lymphocytes (%) (Auto)


  17.3 %


(20.0-45.0)  L 


 


 


Monocytes (%) (Auto)


  7.3 %


(1.0-10.0) 


 


 


Eosinophils (%) (Auto)


  4.5 %


(0.0-3.0)  H 


 


 


Basophils (%) (Auto)


  0.6 %


(0.0-2.0) 


 


 


Differential Total Cells


Counted 100  


  


 


 


Neutrophils % (Manual) 76 % (45-75)  H 


 


Lymphocytes % (Manual) 15 % (20-45)  L 


 


Monocytes % (Manual) 5 % (1-10)   


 


Eosinophils % (Manual) 4 % (0-3)  H 


 


Basophils % (Manual) 0 % (0-2)   


 


Band Neutrophils 0 % (0-8)   


 


Other Cell Type


  Pathologist


review 


 


 


Platelet Estimate Adequate   


 


Platelet Morphology Normal   


 


Red Blood Cell Morphology Normal   


 


Erythrocyte Sedimentation Rate


  50 MM/HR


(0-30)  H 


 


 


Reticulocyte Count


  0.4 %


(0.5-2.0)  L 


 


 


Prothrombin Time


  12.5 SEC


(9.30-11.50)  H 


 


 


Prothromb Time International


Ratio 1.2 (0.9-1.1)


H 


 


 


Activated Partial


Thromboplast Time 39 SEC (23-33)


H 


 


 


Sodium Level


  145 MMOL/L


(136-145) 


 


 


Potassium Level


  3.5 MMOL/L


(3.5-5.1) 


 


 


Chloride Level


  107 MMOL/L


() 


 


 


Carbon Dioxide Level


  31 MMOL/L


(21-32) 


 


 


Anion Gap


  7 mmol/L


(5-15) 


 


 


Blood Urea Nitrogen


  13 mg/dL


(7-18) 


 


 


Creatinine


  1.0 MG/DL


(0.55-1.30) 


 


 


Estimat Glomerular Filtration


Rate > 60 mL/min


(>60) 


 


 


Glucose Level


  105 MG/DL


() 


 


 


Calcium Level


  8.9 MG/DL


(8.5-10.1) 


 


 


Iron Level


  33 ug/dL


()  L 


 


 


Total Iron Binding Capacity


  200 ug/dL


(250-450)  L 


 


 


Percent Iron Saturation 17 % (15-50)   


 


Unsaturated Iron Binding


  167 ug/dL


(112-346) 


 


 


Lactate Dehydrogenase


  147 U/L


() 


 


 


Pro-B-Type Natriuretic Peptide


  684 pg/mL


(0-125)  H 


 


 


Carcinoembryonic Antigen Pending   


 


Vitamin B12 Level


  279 PG/ML


(193-986) 


 


 


Folate


  6.7 NG/ML


(8.6-58.9)  L 


 


 


Stool Occult Blood  Pending  

















Intake and Output  


 


 2/26/20 2/27/20





 19:00 07:00


 


Intake Total 480 ml 2026 ml


 


Output Total 1800 ml 700 ml


 


Balance -1320 ml 1326 ml


 


  


 


Intake Oral 480 ml 


 


IV Total  2026 ml


 


Output Urine Total 1800 ml 700 ml


 


# Voids 2 


 


# Bowel Movements  1








Objective


PHYSICAL EXAMINATION:


GENERAL:  The patient is a well-developed, well-nourished 


female, in no apparent distress.


HEENT:  Eyes, pupils equal and responsive to light and accommodation.


Extraocular movements are intact.


NECK:  Supple without lymphadenopathy.


CHEST:  Lungs are clear to auscultation bilaterally without wheezes or


rales.


CARDIOVASCULAR:  Regular rhythm and rate.  S1, S2 normal without murmurs,


rubs, or gallops.


ABDOMEN:  Soft, nontender, and nondistended.  Positive bowel sounds.  No


evidence of hepatosplenomegaly.  Currently, no rebound or guarding.


EXTREMITIES:  A 2+ pitting edema from the ankles to the knees bilaterally


with erythema of the left anterior calf from the ankle to the knee, left


greater than right.





Assessment/Plan


Assessment/Plan


ASSESSMENT:  This is an 85-year-old  female:





1. Cellulitis of bilateral lower extremities.


2. Lymphedema bilateral lower extremities.


3. Bilateral leg pain.


4. Hypertension.


5.  Acute deep venous thrombosis left common femoral to superficial


femoral vein





TREATMENT:


1. Cellulitis of the bilateral lower extremities.  The patient has been


started empirically on intravenous vancomycin and ceftriaxone.  An


Infectious Disease consultation has been obtained with Dr. Elizabeth.  We


will follow recommendations of Infectious Disease.


2. Hypertension.  Continue Cozaar and Coreg as above.


3.  Start Sixto Samson MD Feb 27, 2020 18:43

## 2020-02-27 NOTE — NUR
NURSE NOTES:

Received report from MARIELLA Moya. Pt resting in bed, AAO x 4, on room air. IV site intact 
and patent. No acute distress noted. Fall precaution maintained. HOB elevated. Elevated 
bilateral legs with pillow. Bed locked, alarm on, side rails up, call light within reach. 
Will continue to monitor.

## 2020-02-28 VITALS — DIASTOLIC BLOOD PRESSURE: 64 MMHG | SYSTOLIC BLOOD PRESSURE: 123 MMHG

## 2020-02-28 VITALS — DIASTOLIC BLOOD PRESSURE: 66 MMHG | SYSTOLIC BLOOD PRESSURE: 144 MMHG

## 2020-02-28 VITALS — SYSTOLIC BLOOD PRESSURE: 133 MMHG | DIASTOLIC BLOOD PRESSURE: 62 MMHG

## 2020-02-28 VITALS — DIASTOLIC BLOOD PRESSURE: 61 MMHG | SYSTOLIC BLOOD PRESSURE: 122 MMHG

## 2020-02-28 VITALS — DIASTOLIC BLOOD PRESSURE: 51 MMHG | SYSTOLIC BLOOD PRESSURE: 99 MMHG

## 2020-02-28 VITALS — DIASTOLIC BLOOD PRESSURE: 52 MMHG | SYSTOLIC BLOOD PRESSURE: 119 MMHG

## 2020-02-28 LAB
ADD MANUAL DIFF: NO
ANION GAP SERPL CALC-SCNC: 11 MMOL/L (ref 5–15)
BASOPHILS NFR BLD AUTO: 0.6 % (ref 0–2)
BUN SERPL-MCNC: 17 MG/DL (ref 7–18)
CALCIUM SERPL-MCNC: 9 MG/DL (ref 8.5–10.1)
CHLORIDE SERPL-SCNC: 107 MMOL/L (ref 98–107)
CO2 SERPL-SCNC: 28 MMOL/L (ref 21–32)
CREAT SERPL-MCNC: 1 MG/DL (ref 0.55–1.3)
EOSINOPHIL NFR BLD AUTO: 3.7 % (ref 0–3)
ERYTHROCYTE [DISTWIDTH] IN BLOOD BY AUTOMATED COUNT: 12.6 % (ref 11.6–14.8)
HCT VFR BLD CALC: 30.3 % (ref 37–47)
HGB BLD-MCNC: 10.2 G/DL (ref 12–16)
LYMPHOCYTES NFR BLD AUTO: 14.9 % (ref 20–45)
MCV RBC AUTO: 86 FL (ref 80–99)
MONOCYTES NFR BLD AUTO: 7.8 % (ref 1–10)
NEUTROPHILS NFR BLD AUTO: 73 % (ref 45–75)
PLATELET # BLD: 193 K/UL (ref 150–450)
POTASSIUM SERPL-SCNC: 3.7 MMOL/L (ref 3.5–5.1)
RBC # BLD AUTO: 3.53 M/UL (ref 4.2–5.4)
SODIUM SERPL-SCNC: 146 MMOL/L (ref 136–145)
WBC # BLD AUTO: 10.5 K/UL (ref 4.8–10.8)

## 2020-02-28 RX ADMIN — TRAMADOL HYDROCHLORIDE PRN MG: 50 TABLET, FILM COATED ORAL at 06:05

## 2020-02-28 RX ADMIN — Medication SCH MLS/HR: at 23:15

## 2020-02-28 RX ADMIN — MORPHINE SULFATE PRN MG: 2 INJECTION, SOLUTION INTRAMUSCULAR; INTRAVENOUS at 22:22

## 2020-02-28 RX ADMIN — CARVEDILOL SCH MG: 25 TABLET, FILM COATED ORAL at 09:24

## 2020-02-28 RX ADMIN — TRAMADOL HYDROCHLORIDE PRN MG: 50 TABLET, FILM COATED ORAL at 12:39

## 2020-02-28 RX ADMIN — RIVAROXABAN SCH MG: 15 TABLET, FILM COATED ORAL at 09:24

## 2020-02-28 RX ADMIN — LOSARTAN POTASSIUM SCH MG: 50 TABLET, FILM COATED ORAL at 09:24

## 2020-02-28 RX ADMIN — ASPIRIN SCH MG: 81 TABLET, DELAYED RELEASE ORAL at 09:24

## 2020-02-28 RX ADMIN — MORPHINE SULFATE PRN MG: 2 INJECTION, SOLUTION INTRAMUSCULAR; INTRAVENOUS at 15:51

## 2020-02-28 RX ADMIN — CARVEDILOL SCH MG: 25 TABLET, FILM COATED ORAL at 17:27

## 2020-02-28 RX ADMIN — RIVAROXABAN SCH MG: 15 TABLET, FILM COATED ORAL at 17:25

## 2020-02-28 NOTE — NUR
NURSE NOTES:

received report from MARIELLA Mccloud. patient in bed, A&Ox4, verbally responsive. no respiratory 
distress noted. c/o mild pain on both feet, patient took tramadol at 0630. will continue to 
monitor effectiveness of medication. elevate both legs and heels with pillow in bed, IV on 
right wrist 24g saline lock intact. bed in the lowest position and locked. call light within 
reach. will continue to provide plan of care.

## 2020-02-28 NOTE — INTERNAL MED PROGRESS NOTE
Subjective


Date of Service:  Feb 28, 2020


Physician Name


Sixto Hardy


Attending Physician


Chris Yarbrough MD





Current Medications








 Medications


  (Trade)  Dose


 Ordered  Sig/Imelda


 Route


 PRN Reason  Start Time


 Stop Time Status Last Admin


Dose Admin


 


 Acetaminophen


  (Tylenol)  1,000 mg  Q6H  PRN


 ORAL


 Mild Pain/Temp > 100.5  2/25/20 22:00


 3/26/20 21:59  2/25/20 22:33


 


 


 Aspirin


  (Ecotrin)  81 mg  DAILY


 ORAL


   2/26/20 09:00


 3/27/20 08:59  2/28/20 09:24


 


 


 Carvedilol


  (Coreg)  25 mg  BID


 ORAL


   2/26/20 09:00


 3/27/20 08:59  2/28/20 17:27


 


 


 Furosemide


  (Lasix)  40 mg  DAILY


 IV


   2/26/20 09:00


 3/27/20 08:59  2/28/20 09:25


 


 


 Lorazepam


  (Ativan 2mg/ml


 1ml)  2 mg  Q4H  PRN


 IV


 For Anxiety  2/28/20 12:15


 3/6/20 12:14   


 


 


 Lorazepam


  (Ativan)  1 mg  Q6H  PRN


 ORAL


 For Anxiety  2/25/20 22:00


 3/3/20 21:59   


 


 


 Losartan Potassium


  (Cozaar)  50 mg  DAILY


 ORAL


   2/26/20 09:00


 3/27/20 08:59  2/28/20 09:24


 


 


 Morphine Sulfate


  (Morphine


 Sulfate)  2 mg  Q4H  PRN


 IVP


 For severe Pain  2/28/20 15:15


 3/6/20 15:14  2/28/20 15:51


 


 


 Ondansetron HCl


  (Zofran)  4 mg  Q4H  PRN


 IVP


 Nausea & Vomiting  2/28/20 15:15


 3/29/20 15:14  2/28/20 15:51


 


 


 Rivaroxaban


  (Xarelto)  15 mg  BID


 ORAL


   2/26/20 18:00


 3/18/20 09:01  2/28/20 17:25


 


 


 Rivaroxaban


  (Xarelto)  20 mg  DAILY


 ORAL


   3/19/20 09:00


 4/18/20 08:59   


 


 


 Tramadol HCl


  (Ultram)  50 mg  Q6H  PRN


 ORAL


 Severe Pain (Pain Scale 7-10)  2/25/20 22:00


 3/3/20 21:59  2/28/20 12:39


 


 


 Vancomycin HCl


  (Vanco rx to


 dose)  1 ea  DAILY  PRN


 MISC


 Per rx protocol  2/25/20 22:00


 3/26/20 21:59   


 


 


 Vancomycin/Sodium


 Chloride  275 ml @ 


 184 mls/hr  Q24H


 IVPB


   2/26/20 00:00


 3/2/20 00:00  2/27/20 23:42


 








Allergies:  


Coded Allergies:  


     No Known Allergies (Unverified , 9/5/19)


ROS Limited/Unobtainable:  No


Constitutional:  Reports: no symptoms


HEENT:  Reports: no symptoms


Cardiovascular:  Reports: no symptoms


Respiratory:  Reports: no symptoms


Gastrointestinal/Abdominal:  Reports: no symptoms


Genitourinary:  Reports: no symptoms


Neurologic/Psychiatric:  Reports: no symptoms


Subjective


84 YO F admitted with bilateral leg swelling and cellulitis bilat lower 

extremities.   Now acute deep venous thrombosis left leg.  Cover for Int Fredy-Dr Yarbrough





Objective





Last Vital Signs








  Date Time  Temp Pulse Resp B/P (MAP) Pulse Ox O2 Delivery O2 Flow Rate FiO2


 


2/28/20 17:27  89  123/68    


 


2/28/20 16:21 99.0       


 


2/28/20 16:00   18  98   


 


2/28/20 09:00      Room Air  











Laboratory Tests








Test


  2/28/20


05:15


 


White Blood Count


  10.5 K/UL


(4.8-10.8)


 


Red Blood Count


  3.53 M/UL


(4.20-5.40)  L


 


Hemoglobin


  10.2 G/DL


(12.0-16.0)  L


 


Hematocrit


  30.3 %


(37.0-47.0)  L


 


Mean Corpuscular Volume 86 FL (80-99)  


 


Mean Corpuscular Hemoglobin


  28.9 PG


(27.0-31.0)


 


Mean Corpuscular Hemoglobin


Concent 33.7 G/DL


(32.0-36.0)


 


Red Cell Distribution Width


  12.6 %


(11.6-14.8)


 


Platelet Count


  193 K/UL


(150-450)


 


Mean Platelet Volume


  5.7 FL


(6.5-10.1)  L


 


Neutrophils (%) (Auto)


  73.0 %


(45.0-75.0)


 


Lymphocytes (%) (Auto)


  14.9 %


(20.0-45.0)  L


 


Monocytes (%) (Auto)


  7.8 %


(1.0-10.0)


 


Eosinophils (%) (Auto)


  3.7 %


(0.0-3.0)  H


 


Basophils (%) (Auto)


  0.6 %


(0.0-2.0)


 


Sodium Level


  146 MMOL/L


(136-145)  H


 


Potassium Level


  3.7 MMOL/L


(3.5-5.1)


 


Chloride Level


  107 MMOL/L


()


 


Carbon Dioxide Level


  28 MMOL/L


(21-32)


 


Anion Gap


  11 mmol/L


(5-15)


 


Blood Urea Nitrogen


  17 mg/dL


(7-18)


 


Creatinine


  1.0 MG/DL


(0.55-1.30)


 


Estimat Glomerular Filtration


Rate > 60 mL/min


(>60)


 


Glucose Level


  109 MG/DL


()  H


 


Calcium Level


  9.0 MG/DL


(8.5-10.1)

















Intake and Output  


 


 2/27/20 2/28/20





 19:00 07:00


 


Intake Total 1080 ml 


 


Output Total 1300 ml 900 ml


 


Balance -220 ml -900 ml


 


  


 


Intake Oral 1080 ml 


 


Output Urine Total 1300 ml 900 ml


 


# Bowel Movements 1 








Objective


PHYSICAL EXAMINATION:


GENERAL:  The patient is a well-developed, well-nourished 


female, in no apparent distress.


HEENT:  Eyes, pupils equal and responsive to light and accommodation.


Extraocular movements are intact.


NECK:  Supple without lymphadenopathy.


CHEST:  Lungs are clear to auscultation bilaterally without wheezes or


rales.


CARDIOVASCULAR:  Regular rhythm and rate.  S1, S2 normal without murmurs,


rubs, or gallops.


ABDOMEN:  Soft, nontender, and nondistended.  Positive bowel sounds.  No


evidence of hepatosplenomegaly.  Currently, no rebound or guarding.


EXTREMITIES:  A 2+ pitting edema from the ankles to the knees bilaterally


with erythema of the left anterior calf from the ankle to the knee, left


greater than right.





Assessment/Plan


Assessment/Plan


ASSESSMENT:  This is an 85-year-old  female:





1. Cellulitis of bilateral lower extremities.


2. Lymphedema bilateral lower extremities.


3. Bilateral leg pain.


4. Hypertension.


5.  Acute deep venous thrombosis left common femoral to superficial


femoral vein





TREATMENT:


1. Cellulitis of the bilateral lower extremities.  The patient has been


started empirically on intravenous vancomycin and ceftriaxone.  An


Infectious Disease consultation has been obtained with Dr. Elizabeth.  We


will follow recommendations of Infectious Disease.


2. Hypertension.  Continue Cozaar and Coreg as above.


3.  Start xarelto











Hardy,Sixto MD Feb 28, 2020 19:39

## 2020-02-28 NOTE — NUR
CASE MANAGEMENT:REVIEW



SI;ACUTE DVT. BLE LYMPHEDEMA/CELLULITIS. LEFT LE EDEMA.

98.6   88   18   144/66   94% ON RA

   



IS;VANCOMYCIN IV Q24 HRS

LASIX IV QD

COZAAR PO QD

COREG PO BID

XARELTO PO QD



DCP;FROM HOME

## 2020-02-28 NOTE — PULMONOLOGY PROGRESS NOTE
Assessment/Plan


Problems:  


(1) Cellulitis


(2) Severe anemia


(3) Peripheral edema


(4) Elephantiasis


(5) Mild pulmonary hypertension


(6) Morbid obesity


(7) History of hypertension


Assessment/Plan


doing better


all reviewed


wound care,


iv abx


podiatry


monitor BP


f/u stool for OB


symptomatic treatment.





Subjective


Interval Events:  doing better


Allergies:  


Coded Allergies:  


     No Known Allergies (Unverified , 9/5/19)





Objective





Last 24 Hour Vital Signs








  Date Time  Temp Pulse Resp B/P (MAP) Pulse Ox O2 Delivery O2 Flow Rate FiO2


 


2/28/20 12:00 98.6 76 18 123/64 (83) 95   


 


2/28/20 09:24    144/66    


 


2/28/20 09:24  85  144/66    


 


2/28/20 09:00      Room Air  


 


2/28/20 08:00 97.9 85 18 144/66 (92) 95   


 


2/28/20 03:59 98.7 88 18 133/62 (85) 94   


 


2/28/20 00:00 98.5 83 18 119/52 (74) 97   


 


2/27/20 21:00      Room Air  


 


2/27/20 20:00 98.4 80 18 123/52 (75) 94   


 


2/27/20 18:09  78  135/61    


 


2/27/20 16:00 97.8 78 20 135/61 (85) 96   

















Intake and Output  


 


 2/27/20 2/28/20





 19:00 07:00


 


Intake Total 1080 ml 


 


Output Total 1300 ml 900 ml


 


Balance -220 ml -900 ml


 


  


 


Intake Oral 1080 ml 


 


Output Urine Total 1300 ml 900 ml


 


# Bowel Movements 1 








General Appearance:  WD/WN


HEENT:  normocephalic, atraumatic


Respiratory/Chest:  chest wall non-tender, lungs clear


Breasts:  no masses


Cardiovascular:  normal rate


Abdomen:  normal bowel sounds, non distended


Extremities:  no cyanosis


Skin:  no rash


Laboratory Tests


2/28/20 05:15: 


White Blood Count 10.5, Red Blood Count 3.53L, Hemoglobin 10.2L, Hematocrit 

30.3L, Mean Corpuscular Volume 86, Mean Corpuscular Hemoglobin 28.9, Mean 

Corpuscular Hemoglobin Concent 33.7, Red Cell Distribution Width 12.6, Platelet 

Count 193, Mean Platelet Volume 5.7L, Neutrophils (%) (Auto) 73.0, Lymphocytes (

%) (Auto) 14.9L, Monocytes (%) (Auto) 7.8, Eosinophils (%) (Auto) 3.7H, 

Basophils (%) (Auto) 0.6, Sodium Level 146H, Potassium Level 3.7, Chloride 

Level 107, Carbon Dioxide Level 28, Anion Gap 11, Blood Urea Nitrogen 17, 

Creatinine 1.0, Estimat Glomerular Filtration Rate > 60, Glucose Level 109H, 

Calcium Level 9.0





Current Medications








 Medications


  (Trade)  Dose


 Ordered  Sig/Imelda


 Route


 PRN Reason  Start Time


 Stop Time Status Last Admin


Dose Admin


 


 Acetaminophen


  (Tylenol)  1,000 mg  Q6H  PRN


 ORAL


 Mild Pain/Temp > 100.5  2/25/20 22:00


 3/26/20 21:59  2/25/20 22:33


 


 


 Aspirin


  (Ecotrin)  81 mg  DAILY


 ORAL


   2/26/20 09:00


 3/27/20 08:59  2/28/20 09:24


 


 


 Carvedilol


  (Coreg)  25 mg  BID


 ORAL


   2/26/20 09:00


 3/27/20 08:59  2/28/20 09:24


 


 


 Furosemide


  (Lasix)  40 mg  DAILY


 IV


   2/26/20 09:00


 3/27/20 08:59  2/28/20 09:25


 


 


 Lorazepam


  (Ativan 2mg/ml


 1ml)  2 mg  Q4H  PRN


 IV


 For Anxiety  2/28/20 12:15


 3/6/20 12:14   


 


 


 Lorazepam


  (Ativan)  1 mg  Q6H  PRN


 ORAL


 For Anxiety  2/25/20 22:00


 3/3/20 21:59   


 


 


 Losartan Potassium


  (Cozaar)  50 mg  DAILY


 ORAL


   2/26/20 09:00


 3/27/20 08:59  2/28/20 09:24


 


 


 Rivaroxaban


  (Xarelto)  15 mg  BID


 ORAL


   2/26/20 18:00


 3/18/20 09:01  2/28/20 09:24


 


 


 Rivaroxaban


  (Xarelto)  20 mg  DAILY


 ORAL


   3/19/20 09:00


 4/18/20 08:59   


 


 


 Tramadol HCl


  (Ultram)  50 mg  Q6H  PRN


 ORAL


 Severe Pain (Pain Scale 7-10)  2/25/20 22:00


 3/3/20 21:59  2/28/20 12:39


 


 


 Vancomycin HCl


  (Vanco rx to


 dose)  1 ea  DAILY  PRN


 MISC


 Per rx protocol  2/25/20 22:00


 3/26/20 21:59   


 


 


 Vancomycin/Sodium


 Chloride  275 ml @ 


 184 mls/hr  Q24H


 IVPB


   2/26/20 00:00


 3/2/20 00:00  2/27/20 23:42


 

















Aleisha Lagunas MD Feb 28, 2020 14:57

## 2020-02-28 NOTE — NUR
NURSE NOTES:

Pt. received from MARIELLA Groves. Pt. AAOx4, on room air, breathing is even and unlabored, and no 
complaints of pain at this time.  IV right right wrist 24g asymptomatic, intact and patent; 
saline locked.  Pt.'s heels are elevated per MD order.  Bed is low and locked, side rails x2 
up, bed alarm active, and call light is in reach.  Will continue to monitor.

## 2020-02-28 NOTE — NUR
RD ASSESSMENT & RECOMMENDATIONS

SEE CARE ACTIVITY FOR COMPLETE ASSESSMENT



DAILY ESTIMATED NEEDS:

Needs based on obese, wound 61kg adj  

22-25  kcals/kg 

5717-0433  total kcals

1.25-1.5  g protein/kg

76-91  g total protein 

Fluid per MD, on lasix  mL/kg

 total fluid mLs



NUTRITION DIAGNOSIS:

Decreased sodium intake needs R/T fluid retention and cardiac hx as

evidenced by h/o HTN, edematous w/ 2+ BL leg, on lasix.





CURRENT DIET:REGULAR    





 

PO DIET RECOMMENDATIONS:

LOW NA 







ADDITIONAL RECOMMENDATIONS:

1) Calibrated bedscale wt 

2) Check lytes daily on lasix 

3) Skin integrity: add MVI x 1 


-------------------------------------------------------------------------------

Addendum: 02/28/20 at 1539 by HILDA RAHMAN RD

-------------------------------------------------------------------------------

4) Rec folic acid 1mg QD: low folate 6.7

## 2020-02-28 NOTE — INFECTIOUS DISEASES PROG NOTE
Assessment/Plan


Assessment/Plan


ASSESSMENT:  The patient is an 85-year-old female with 





Lower extremity lymphedema/cellulitis


Left lower extremity edema.


Normal white blood cells and afebrile








DVT: Acute DVT in the left common femoral vein and superficial femoral vein


Hypertension.


Bilateral lymphedema








PLAN:








Continue the patient on IV vancomycin # 2 





2/27 Sp  Rocephin # 1 


Monitor CBC


Monitor BMP.


Blood cultures.





Subjective


Allergies:  


Coded Allergies:  


     No Known Allergies (Unverified , 9/5/19)


Subjective


no acute event





Objective


Vital Signs





Last 24 Hour Vital Signs








  Date Time  Temp Pulse Resp B/P (MAP) Pulse Ox O2 Delivery O2 Flow Rate FiO2


 


2/28/20 09:24    144/66    


 


2/28/20 09:24  85  144/66    


 


2/28/20 09:00      Room Air  


 


2/28/20 08:00 97.9 85 18 144/66 (92) 95   


 


2/28/20 03:59 98.7 88 18 133/62 (85) 94   


 


2/28/20 00:00 98.5 83 18 119/52 (74) 97   


 


2/27/20 21:00      Room Air  


 


2/27/20 20:00 98.4 80 18 123/52 (75) 94   


 


2/27/20 18:09  78  135/61    


 


2/27/20 16:00 97.8 78 20 135/61 (85) 96   


 


2/27/20 12:00 97.8 80 20 125/68 (87) 98   








Height (Feet):  5


Height (Inches):  2.00


Weight (Pounds):  227


HEENT:  anicteric


Respiratory/Chest:  no respiratory distress


Cardiovascular:  regular rhythm


Abdomen:  soft, non tender





Laboratory Tests








Test


  2/28/20


05:15


 


White Blood Count


  10.5 K/UL


(4.8-10.8)


 


Red Blood Count


  3.53 M/UL


(4.20-5.40)  L


 


Hemoglobin


  10.2 G/DL


(12.0-16.0)  L


 


Hematocrit


  30.3 %


(37.0-47.0)  L


 


Mean Corpuscular Volume 86 FL (80-99)  


 


Mean Corpuscular Hemoglobin


  28.9 PG


(27.0-31.0)


 


Mean Corpuscular Hemoglobin


Concent 33.7 G/DL


(32.0-36.0)


 


Red Cell Distribution Width


  12.6 %


(11.6-14.8)


 


Platelet Count


  193 K/UL


(150-450)


 


Mean Platelet Volume


  5.7 FL


(6.5-10.1)  L


 


Neutrophils (%) (Auto)


  73.0 %


(45.0-75.0)


 


Lymphocytes (%) (Auto)


  14.9 %


(20.0-45.0)  L


 


Monocytes (%) (Auto)


  7.8 %


(1.0-10.0)


 


Eosinophils (%) (Auto)


  3.7 %


(0.0-3.0)  H


 


Basophils (%) (Auto)


  0.6 %


(0.0-2.0)


 


Sodium Level


  146 MMOL/L


(136-145)  H


 


Potassium Level


  3.7 MMOL/L


(3.5-5.1)


 


Chloride Level


  107 MMOL/L


()


 


Carbon Dioxide Level


  28 MMOL/L


(21-32)


 


Anion Gap


  11 mmol/L


(5-15)


 


Blood Urea Nitrogen


  17 mg/dL


(7-18)


 


Creatinine


  1.0 MG/DL


(0.55-1.30)


 


Estimat Glomerular Filtration


Rate > 60 mL/min


(>60)


 


Glucose Level


  109 MG/DL


()  H


 


Calcium Level


  9.0 MG/DL


(8.5-10.1)











Current Medications








 Medications


  (Trade)  Dose


 Ordered  Sig/Imelda


 Route


 PRN Reason  Start Time


 Stop Time Status Last Admin


Dose Admin


 


 Acetaminophen


  (Tylenol)  1,000 mg  Q6H  PRN


 ORAL


 Mild Pain/Temp > 100.5  2/25/20 22:00


 3/26/20 21:59  2/25/20 22:33


 


 


 Aspirin


  (Ecotrin)  81 mg  DAILY


 ORAL


   2/26/20 09:00


 3/27/20 08:59  2/28/20 09:24


 


 


 Carvedilol


  (Coreg)  25 mg  BID


 ORAL


   2/26/20 09:00


 3/27/20 08:59  2/28/20 09:24


 


 


 Furosemide


  (Lasix)  40 mg  DAILY


 IV


   2/26/20 09:00


 3/27/20 08:59  2/28/20 09:25


 


 


 Lorazepam


  (Ativan)  1 mg  Q6H  PRN


 ORAL


 For Anxiety  2/25/20 22:00


 3/3/20 21:59   


 


 


 Losartan Potassium


  (Cozaar)  50 mg  DAILY


 ORAL


   2/26/20 09:00


 3/27/20 08:59  2/28/20 09:24


 


 


 Rivaroxaban


  (Xarelto)  15 mg  BID


 ORAL


   2/26/20 18:00


 3/18/20 09:01  2/28/20 09:24


 


 


 Rivaroxaban


  (Xarelto)  20 mg  DAILY


 ORAL


   3/19/20 09:00


 4/18/20 08:59   


 


 


 Tramadol HCl


  (Ultram)  50 mg  Q6H  PRN


 ORAL


 Severe Pain (Pain Scale 7-10)  2/25/20 22:00


 3/3/20 21:59  2/28/20 06:05


 


 


 Vancomycin HCl


  (Vanco rx to


 dose)  1 ea  DAILY  PRN


 MISC


 Per rx protocol  2/25/20 22:00


 3/26/20 21:59   


 


 


 Vancomycin/Sodium


 Chloride  275 ml @ 


 184 mls/hr  Q24H


 IVPB


   2/26/20 00:00


 3/2/20 00:00  2/27/20 23:42


 

















Jorge Elizabeth MD Feb 28, 2020 11:30

## 2020-02-29 VITALS — DIASTOLIC BLOOD PRESSURE: 49 MMHG | SYSTOLIC BLOOD PRESSURE: 124 MMHG

## 2020-02-29 VITALS — DIASTOLIC BLOOD PRESSURE: 51 MMHG | SYSTOLIC BLOOD PRESSURE: 113 MMHG

## 2020-02-29 VITALS — DIASTOLIC BLOOD PRESSURE: 47 MMHG | SYSTOLIC BLOOD PRESSURE: 103 MMHG

## 2020-02-29 VITALS — SYSTOLIC BLOOD PRESSURE: 103 MMHG | DIASTOLIC BLOOD PRESSURE: 59 MMHG

## 2020-02-29 VITALS — DIASTOLIC BLOOD PRESSURE: 73 MMHG | SYSTOLIC BLOOD PRESSURE: 123 MMHG

## 2020-02-29 VITALS — DIASTOLIC BLOOD PRESSURE: 55 MMHG | SYSTOLIC BLOOD PRESSURE: 116 MMHG

## 2020-02-29 LAB
ADD MANUAL DIFF: NO
ANION GAP SERPL CALC-SCNC: 10 MMOL/L (ref 5–15)
BASOPHILS NFR BLD AUTO: 0.7 % (ref 0–2)
BUN SERPL-MCNC: 19 MG/DL (ref 7–18)
CALCIUM SERPL-MCNC: 9.1 MG/DL (ref 8.5–10.1)
CHLORIDE SERPL-SCNC: 100 MMOL/L (ref 98–107)
CO2 SERPL-SCNC: 28 MMOL/L (ref 21–32)
CREAT SERPL-MCNC: 1.2 MG/DL (ref 0.55–1.3)
EOSINOPHIL NFR BLD AUTO: 4.2 % (ref 0–3)
ERYTHROCYTE [DISTWIDTH] IN BLOOD BY AUTOMATED COUNT: 12.8 % (ref 11.6–14.8)
HCT VFR BLD CALC: 29.8 % (ref 37–47)
HGB BLD-MCNC: 9.9 G/DL (ref 12–16)
LYMPHOCYTES NFR BLD AUTO: 19 % (ref 20–45)
MCV RBC AUTO: 86 FL (ref 80–99)
MONOCYTES NFR BLD AUTO: 7.6 % (ref 1–10)
NEUTROPHILS NFR BLD AUTO: 68.6 % (ref 45–75)
PLATELET # BLD: 224 K/UL (ref 150–450)
POTASSIUM SERPL-SCNC: 4.4 MMOL/L (ref 3.5–5.1)
RBC # BLD AUTO: 3.45 M/UL (ref 4.2–5.4)
SODIUM SERPL-SCNC: 138 MMOL/L (ref 136–145)
WBC # BLD AUTO: 10.8 K/UL (ref 4.8–10.8)

## 2020-02-29 RX ADMIN — CARVEDILOL SCH MG: 25 TABLET, FILM COATED ORAL at 17:54

## 2020-02-29 RX ADMIN — MORPHINE SULFATE PRN MG: 2 INJECTION, SOLUTION INTRAMUSCULAR; INTRAVENOUS at 10:23

## 2020-02-29 RX ADMIN — RIVAROXABAN SCH MG: 15 TABLET, FILM COATED ORAL at 17:24

## 2020-02-29 RX ADMIN — TRAMADOL HYDROCHLORIDE PRN MG: 50 TABLET, FILM COATED ORAL at 08:33

## 2020-02-29 RX ADMIN — ASPIRIN SCH MG: 81 TABLET, DELAYED RELEASE ORAL at 08:33

## 2020-02-29 RX ADMIN — MORPHINE SULFATE PRN MG: 2 INJECTION, SOLUTION INTRAMUSCULAR; INTRAVENOUS at 15:40

## 2020-02-29 RX ADMIN — RIVAROXABAN SCH MG: 15 TABLET, FILM COATED ORAL at 08:33

## 2020-02-29 RX ADMIN — LOSARTAN POTASSIUM SCH MG: 50 TABLET, FILM COATED ORAL at 08:34

## 2020-02-29 RX ADMIN — CARVEDILOL SCH MG: 25 TABLET, FILM COATED ORAL at 08:33

## 2020-02-29 RX ADMIN — MORPHINE SULFATE PRN MG: 2 INJECTION, SOLUTION INTRAMUSCULAR; INTRAVENOUS at 05:36

## 2020-02-29 NOTE — NUR
NURSE NOTES:

Pt. received from MARIELLA Damon.  Pt AAOx4, on room air, no indications of pain and no signs of 
respiratory distress.  IV site right wrist 24g, asymptomatic, intact, and patent; saline 
locked.  Bed is low and locked, side rails x2 up, bed alarm active, and call light is in 
reach.  Will continue to monitor.

## 2020-02-29 NOTE — NUR
NURSE NOTES:

Received patient in bed asleep. No SOB or acute distress. IV line intact. HOB elevated. Bed 
locked in lowest position. Call light within reach. Will continue plan of care.

## 2020-02-29 NOTE — NUR
NURSE NOTES:

Patient said she was feeling itchy on the left lateral leg. RN checked and found a skin tear 
measuring about 1cm x 1cm, dark pink, no active bleeding, no discharge. Reported to charge 
nurse and covered with hydrogel and optifoam. Will continue to monitor.

## 2020-02-29 NOTE — NUR
PT Note

Acknowledged order for PT eval. Chart reviewed. Patient has acute DVT of the LLE. CN advised 
to hold off on PT till further orders.

## 2020-02-29 NOTE — INTERNAL MED PROGRESS NOTE
Subjective


Date of Service:  Feb 29, 2020


Physician Name


Hardy,Sixto


Attending Physician


Chris Yarbrough MD





Current Medications








 Medications


  (Trade)  Dose


 Ordered  Sig/Imelda


 Route


 PRN Reason  Start Time


 Stop Time Status Last Admin


Dose Admin


 


 Acetaminophen


  (Tylenol)  1,000 mg  Q6H  PRN


 ORAL


 Mild Pain/Temp > 100.5  2/25/20 22:00


 3/26/20 21:59  2/25/20 22:33


 


 


 Aspirin


  (Ecotrin)  81 mg  DAILY


 ORAL


   2/26/20 09:00


 3/27/20 08:59  2/29/20 08:33


 


 


 Carvedilol


  (Coreg)  25 mg  BID


 ORAL


   2/26/20 09:00


 3/27/20 08:59  2/29/20 08:33


 


 


 Furosemide


  (Lasix)  40 mg  DAILY


 IV


   2/26/20 09:00


 3/27/20 08:59  2/29/20 08:34


 


 


 Lorazepam


  (Ativan 2mg/ml


 1ml)  2 mg  Q4H  PRN


 IV


 For Anxiety  2/28/20 12:15


 3/6/20 12:14   


 


 


 Lorazepam


  (Ativan)  1 mg  Q6H  PRN


 ORAL


 For Anxiety  2/25/20 22:00


 3/3/20 21:59   


 


 


 Losartan Potassium


  (Cozaar)  50 mg  DAILY


 ORAL


   2/26/20 09:00


 3/27/20 08:59  2/29/20 08:34


 


 


 Morphine Sulfate


  (Morphine


 Sulfate)  2 mg  Q4H  PRN


 IVP


 For severe Pain  2/28/20 15:15


 3/6/20 15:14  2/29/20 10:23


 


 


 Ondansetron HCl


  (Zofran)  4 mg  Q4H  PRN


 IVP


 Nausea & Vomiting  2/28/20 15:15


 3/29/20 15:14  2/29/20 05:37


 


 


 Rivaroxaban


  (Xarelto)  15 mg  BID


 ORAL


   2/26/20 18:00


 3/18/20 09:01  2/29/20 08:33


 


 


 Rivaroxaban


  (Xarelto)  20 mg  DAILY


 ORAL


   3/19/20 09:00


 4/18/20 08:59   


 


 


 Tramadol HCl


  (Ultram)  50 mg  Q6H  PRN


 ORAL


 Severe Pain (Pain Scale 7-10)  2/25/20 22:00


 3/3/20 21:59  2/29/20 08:33


 


 


 Vancomycin HCl


  (Vanco rx to


 dose)  1 ea  DAILY  PRN


 MISC


 Per rx protocol  2/25/20 22:00


 3/26/20 21:59   


 


 


 Vancomycin HCl 1


 gm/Dextrose  275 ml @ 


 183.708


 mls/hr  Q24H


 IVPB


   3/1/20 00:00


 3/6/20 00:00   


 








Allergies:  


Coded Allergies:  


     No Known Allergies (Unverified , 9/5/19)


ROS Limited/Unobtainable:  No


Constitutional:  Reports: no symptoms


HEENT:  Reports: no symptoms


Cardiovascular:  Reports: no symptoms


Respiratory:  Reports: no symptoms


Gastrointestinal/Abdominal:  Reports: no symptoms


Genitourinary:  Reports: no symptoms


Neurologic/Psychiatric:  Reports: no symptoms


Subjective


86 YO F admitted with bilateral leg swelling and cellulitis bilat lower 

extremities.   Now acute deep venous thrombosis left leg.  Cover for Int Fredy-Dr Yarbrough





Objective





Last Vital Signs








  Date Time  Temp Pulse Resp B/P (MAP) Pulse Ox O2 Delivery O2 Flow Rate FiO2


 


2/29/20 12:00 97.9 75 17 113/51 (71) 92   


 


2/29/20 09:00      Room Air  











Laboratory Tests








Test


  2/28/20


23:07 2/29/20


04:30


 


Vancomycin Level Trough


  16.0 ug/mL


(5.0-12.0)  H 


 


 


White Blood Count


  


  10.8 K/UL


(4.8-10.8)


 


Red Blood Count


  


  3.45 M/UL


(4.20-5.40)  L


 


Hemoglobin


  


  9.9 G/DL


(12.0-16.0)  L


 


Hematocrit


  


  29.8 %


(37.0-47.0)  L


 


Mean Corpuscular Volume  86 FL (80-99)  


 


Mean Corpuscular Hemoglobin


  


  28.7 PG


(27.0-31.0)


 


Mean Corpuscular Hemoglobin


Concent 


  33.3 G/DL


(32.0-36.0)


 


Red Cell Distribution Width


  


  12.8 %


(11.6-14.8)


 


Platelet Count


  


  224 K/UL


(150-450)


 


Mean Platelet Volume


  


  5.7 FL


(6.5-10.1)  L


 


Neutrophils (%) (Auto)


  


  68.6 %


(45.0-75.0)


 


Lymphocytes (%) (Auto)


  


  19.0 %


(20.0-45.0)  L


 


Monocytes (%) (Auto)


  


  7.6 %


(1.0-10.0)


 


Eosinophils (%) (Auto)


  


  4.2 %


(0.0-3.0)  H


 


Basophils (%) (Auto)


  


  0.7 %


(0.0-2.0)


 


Sodium Level


  


  138 MMOL/L


(136-145)


 


Potassium Level


  


  4.4 MMOL/L


(3.5-5.1)


 


Chloride Level


  


  100 MMOL/L


()


 


Carbon Dioxide Level


  


  28 MMOL/L


(21-32)


 


Anion Gap


  


  10 mmol/L


(5-15)


 


Blood Urea Nitrogen


  


  19 mg/dL


(7-18)  H


 


Creatinine


  


  1.2 MG/DL


(0.55-1.30)


 


Estimat Glomerular Filtration


Rate 


  52.0 mL/min


(>60)


 


Glucose Level


  


  70 MG/DL


()  L


 


Calcium Level


  


  9.1 MG/DL


(8.5-10.1)











Microbiology








 Date/Time


Source Procedure


Growth Status


 


 


 2/27/20 17:08


Blood Blood Culture - Preliminary


NO GROWTH AFTER 24 HOURS Resulted


 


 2/27/20 17:00


Blood Blood Culture - Preliminary


NO GROWTH AFTER 24 HOURS Resulted

















Intake and Output  


 


 2/28/20 2/29/20





 19:00 07:00


 


Intake Total 800 ml 635 ml


 


Output Total  800 ml


 


Balance 800 ml -165 ml


 


  


 


IV Total  275 ml


 


Other 800 ml 360 ml


 


Output Urine Total  800 ml








Objective


PHYSICAL EXAMINATION:


GENERAL:  The patient is a well-developed, well-nourished 


female, in no apparent distress.


HEENT:  Eyes, pupils equal and responsive to light and accommodation.


Extraocular movements are intact.


NECK:  Supple without lymphadenopathy.


CHEST:  Lungs are clear to auscultation bilaterally without wheezes or


rales.


CARDIOVASCULAR:  Regular rhythm and rate.  S1, S2 normal without murmurs,


rubs, or gallops.


ABDOMEN:  Soft, nontender, and nondistended.  Positive bowel sounds.  No


evidence of hepatosplenomegaly.  Currently, no rebound or guarding.


EXTREMITIES:  A 2+ pitting edema from the ankles to the knees bilaterally


with erythema of the left anterior calf from the ankle to the knee, left


greater than right.





Assessment/Plan


Assessment/Plan


ASSESSMENT:  This is an 85-year-old  female:





1. Cellulitis of bilateral lower extremities.


2. Lymphedema bilateral lower extremities.


3. Bilateral leg pain.


4. Hypertension.


5.  Acute deep venous thrombosis left common femoral to superficial


femoral vein





TREATMENT:


1. Cellulitis of the bilateral lower extremities.


ABX= vancomycin and S/P ceftriaxone.  An


Infectious Disease consultation has been obtained with Dr. Elizabeth.  We


will follow recommendations of Infectious Disease.


2. Hypertension.  Continue Cozaar and Coreg as above.


3.  Start Sixto Samson MD Feb 29, 2020 14:33

## 2020-02-29 NOTE — PULMONOLOGY PROGRESS NOTE
Assessment/Plan


Assessment/Plan


ASSESSMENT


Left lower extremity cellulitis


Acute DVT left CFV and SFV


Lymphedema


Peripheral edema


Morbid obesity


Hypertension


Mild pulmonary hypertension





PLAN OF CARE


[facility] floor


abx as per ID 


venous Duplex+ acute DVr 


started on Xarelto


monitor H&H with goal to keep Hgb above 7


anemia work-up noted


stool OB negative


CEA WNL 


continue IV Lasix with close monitoring of  volumes and  edema


monitor renal parameters , correct electrolytes as needed


BP  management with BB  and ARB


podiatry input appreciated ; no need for any   surgical intervention at this 

time; offload


supportive care   





case discussed and evaluated by supervising physician





Subjective


Allergies:  


Coded Allergies:  


     No Known Allergies (Unverified , 9/5/19)


Subjective


pulse ox stable on RA, remains afebrile





Objective





Last 24 Hour Vital Signs








  Date Time  Temp Pulse Resp B/P (MAP) Pulse Ox O2 Delivery O2 Flow Rate FiO2


 


2/29/20 04:00 98.2 84 18 124/49 (74) 96   


 


2/29/20 00:00 98.8 82 16 103/47 (65) 92   


 


2/28/20 22:52 99.0       


 


2/28/20 21:00      Room Air  


 


2/28/20 20:00 98.9 84 18 99/51 (67) 100   


 


2/28/20 17:27  89  123/68    


 


2/28/20 16:00 99.0 84 18 122/61 (81) 98   


 


2/28/20 12:00 98.6 76 18 123/64 (83) 95   


 


2/28/20 09:24    144/66    


 


2/28/20 09:24  85  144/66    


 


2/28/20 09:00      Room Air  


 


2/28/20 08:00 97.9 85 18 144/66 (92) 95   

















Intake and Output  


 


 2/28/20 2/29/20





 19:00 07:00


 


Intake Total 800 ml 635 ml


 


Output Total  800 ml


 


Balance 800 ml -165 ml


 


  


 


IV Total  275 ml


 


Other 800 ml 360 ml


 


Output Urine Total  800 ml








General Appearance:  no acute distress


HEENT:  normocephalic, atraumatic, anicteric


Respiratory/Chest:  lungs clear, no respiratory distress


Cardiovascular:  normal rate


Abdomen:  soft, non tender


Extremities:  other - edema VLE +3 , L>R


Neurologic/Psychiatric:  alert


Musculoskeletal:  atrophy - BLE





Microbiology








 Date/Time


Source Procedure


Growth Status


 


 


 2/27/20 17:08


Blood Blood Culture - Preliminary


NO GROWTH AFTER 24 HOURS Resulted


 


 2/27/20 17:00


Blood Blood Culture - Preliminary


NO GROWTH AFTER 24 HOURS Resulted








Laboratory Tests


2/28/20 23:07: Vancomycin Level Trough 16.0H


2/29/20 04:30: 


White Blood Count 10.8, Red Blood Count 3.45L, Hemoglobin 9.9L, Hematocrit 29.8L

, Mean Corpuscular Volume 86, Mean Corpuscular Hemoglobin 28.7, Mean 

Corpuscular Hemoglobin Concent 33.3, Red Cell Distribution Width 12.8, Platelet 

Count 224, Mean Platelet Volume 5.7L, Neutrophils (%) (Auto) 68.6, Lymphocytes (

%) (Auto) 19.0L, Monocytes (%) (Auto) 7.6, Eosinophils (%) (Auto) 4.2H, 

Basophils (%) (Auto) 0.7, Sodium Level 138, Potassium Level 4.4, Chloride Level 

100, Carbon Dioxide Level 28, Anion Gap 10, Blood Urea Nitrogen 19H, Creatinine 

1.2, Estimat Glomerular Filtration Rate 52.0, Glucose Level 70L, Calcium Level 

9.1





Current Medications








 Medications


  (Trade)  Dose


 Ordered  Sig/Imelda


 Route


 PRN Reason  Start Time


 Stop Time Status Last Admin


Dose Admin


 


 Acetaminophen


  (Tylenol)  1,000 mg  Q6H  PRN


 ORAL


 Mild Pain/Temp > 100.5  2/25/20 22:00


 3/26/20 21:59  2/25/20 22:33


 


 


 Aspirin


  (Ecotrin)  81 mg  DAILY


 ORAL


   2/26/20 09:00


 3/27/20 08:59  2/28/20 09:24


 


 


 Carvedilol


  (Coreg)  25 mg  BID


 ORAL


   2/26/20 09:00


 3/27/20 08:59  2/28/20 17:27


 


 


 Furosemide


  (Lasix)  40 mg  DAILY


 IV


   2/26/20 09:00


 3/27/20 08:59  2/28/20 09:25


 


 


 Lorazepam


  (Ativan 2mg/ml


 1ml)  2 mg  Q4H  PRN


 IV


 For Anxiety  2/28/20 12:15


 3/6/20 12:14   


 


 


 Lorazepam


  (Ativan)  1 mg  Q6H  PRN


 ORAL


 For Anxiety  2/25/20 22:00


 3/3/20 21:59   


 


 


 Losartan Potassium


  (Cozaar)  50 mg  DAILY


 ORAL


   2/26/20 09:00


 3/27/20 08:59  2/28/20 09:24


 


 


 Morphine Sulfate


  (Morphine


 Sulfate)  2 mg  Q4H  PRN


 IVP


 For severe Pain  2/28/20 15:15


 3/6/20 15:14  2/29/20 05:36


 


 


 Ondansetron HCl


  (Zofran)  4 mg  Q4H  PRN


 IVP


 Nausea & Vomiting  2/28/20 15:15


 3/29/20 15:14  2/29/20 05:37


 


 


 Rivaroxaban


  (Xarelto)  15 mg  BID


 ORAL


   2/26/20 18:00


 3/18/20 09:01  2/28/20 17:25


 


 


 Rivaroxaban


  (Xarelto)  20 mg  DAILY


 ORAL


   3/19/20 09:00


 4/18/20 08:59   


 


 


 Tramadol HCl


  (Ultram)  50 mg  Q6H  PRN


 ORAL


 Severe Pain (Pain Scale 7-10)  2/25/20 22:00


 3/3/20 21:59  2/28/20 12:39


 


 


 Vancomycin HCl


  (Vanco rx to


 dose)  1 ea  DAILY  PRN


 MISC


 Per rx protocol  2/25/20 22:00


 3/26/20 21:59   


 


 


 Vancomycin HCl 1


 gm/Dextrose  275 ml @ 


 183.708


 mls/hr  Q24H


 IVPB


   3/1/20 00:00


 3/6/20 00:00   


 

















Lennie Rutledge NP Feb 29, 2020 07:41

## 2020-03-01 VITALS — DIASTOLIC BLOOD PRESSURE: 66 MMHG | SYSTOLIC BLOOD PRESSURE: 132 MMHG

## 2020-03-01 VITALS — SYSTOLIC BLOOD PRESSURE: 126 MMHG | DIASTOLIC BLOOD PRESSURE: 75 MMHG

## 2020-03-01 VITALS — SYSTOLIC BLOOD PRESSURE: 120 MMHG | DIASTOLIC BLOOD PRESSURE: 63 MMHG

## 2020-03-01 VITALS — SYSTOLIC BLOOD PRESSURE: 130 MMHG | DIASTOLIC BLOOD PRESSURE: 70 MMHG

## 2020-03-01 VITALS — DIASTOLIC BLOOD PRESSURE: 49 MMHG | SYSTOLIC BLOOD PRESSURE: 114 MMHG

## 2020-03-01 VITALS — SYSTOLIC BLOOD PRESSURE: 130 MMHG | DIASTOLIC BLOOD PRESSURE: 61 MMHG

## 2020-03-01 LAB
ADD MANUAL DIFF: NO
ANION GAP SERPL CALC-SCNC: 6 MMOL/L (ref 5–15)
BASOPHILS NFR BLD AUTO: 0.6 % (ref 0–2)
BUN SERPL-MCNC: 25 MG/DL (ref 7–18)
CALCIUM SERPL-MCNC: 9.1 MG/DL (ref 8.5–10.1)
CHLORIDE SERPL-SCNC: 102 MMOL/L (ref 98–107)
CO2 SERPL-SCNC: 32 MMOL/L (ref 21–32)
CREAT SERPL-MCNC: 1.7 MG/DL (ref 0.55–1.3)
EOSINOPHIL NFR BLD AUTO: 4 % (ref 0–3)
ERYTHROCYTE [DISTWIDTH] IN BLOOD BY AUTOMATED COUNT: 12.7 % (ref 11.6–14.8)
HCT VFR BLD CALC: 29.8 % (ref 37–47)
HGB BLD-MCNC: 9.8 G/DL (ref 12–16)
LYMPHOCYTES NFR BLD AUTO: 12.2 % (ref 20–45)
MCV RBC AUTO: 87 FL (ref 80–99)
MONOCYTES NFR BLD AUTO: 7.5 % (ref 1–10)
NEUTROPHILS NFR BLD AUTO: 75.7 % (ref 45–75)
PLATELET # BLD: 227 K/UL (ref 150–450)
POTASSIUM SERPL-SCNC: 4.2 MMOL/L (ref 3.5–5.1)
RBC # BLD AUTO: 3.45 M/UL (ref 4.2–5.4)
SODIUM SERPL-SCNC: 140 MMOL/L (ref 136–145)
WBC # BLD AUTO: 12 K/UL (ref 4.8–10.8)

## 2020-03-01 RX ADMIN — LOSARTAN POTASSIUM SCH MG: 50 TABLET, FILM COATED ORAL at 09:21

## 2020-03-01 RX ADMIN — CARVEDILOL SCH MG: 25 TABLET, FILM COATED ORAL at 17:58

## 2020-03-01 RX ADMIN — RIVAROXABAN SCH MG: 15 TABLET, FILM COATED ORAL at 17:58

## 2020-03-01 RX ADMIN — ASPIRIN SCH MG: 81 TABLET, DELAYED RELEASE ORAL at 09:20

## 2020-03-01 RX ADMIN — MORPHINE SULFATE PRN MG: 2 INJECTION, SOLUTION INTRAMUSCULAR; INTRAVENOUS at 04:13

## 2020-03-01 RX ADMIN — CARVEDILOL SCH MG: 25 TABLET, FILM COATED ORAL at 09:20

## 2020-03-01 RX ADMIN — RIVAROXABAN SCH MG: 15 TABLET, FILM COATED ORAL at 09:20

## 2020-03-01 NOTE — PULMONOLOGY PROGRESS NOTE
Assessment/Plan


Assessment/Plan


ASSESSMENT


Left lower extremity cellulitis


Acute DVT left CFV and SFV


Lymphedema


Peripheral edema


Morbid obesity


Hypertension


Mild pulmonary hypertension





PLAN OF CARE


[facility] floor


abx as per ID , BCX NGTD


venous Duplex+ acute DVr 


started on Xarelto


monitor H&H with goal to keep Hgb above 7


anemia work-up noted


stool OB negative


CEA WNL 


continue IV Lasix with close monitoring of  volumes and  edema


monitor renal parameters , correct electrolytes as needed


BP  management with BB  and ARB


podiatry input appreciated ; no need for any   surgical intervention at this 

time; offload


supportive care   





case discussed and evaluated by supervising physician





Subjective


Allergies:  


Coded Allergies:  


     No Known Allergies (Unverified , 9/5/19)


Subjective


pulse ox stable on RA, remains afebrile





Objective





Last 24 Hour Vital Signs








  Date Time  Temp Pulse Resp B/P (MAP) Pulse Ox O2 Delivery O2 Flow Rate FiO2


 


3/1/20 04:43 98.2       


 


3/1/20 04:00 98.5 89 20 130/61 (84) 97   


 


3/1/20 00:00 98.2 79 21 120/63 (82) 96   


 


2/29/20 21:00      Room Air  


 


2/29/20 20:00 98.7 84 20 116/55 (75) 95   


 


2/29/20 17:54  89  103/59    


 


2/29/20 16:00 98.2 89 19 103/59 (74) 94   


 


2/29/20 12:00 97.9 75 17 113/51 (71) 92   


 


2/29/20 09:00      Room Air  


 


2/29/20 08:34    123/73    


 


2/29/20 08:33  86  123/73    


 


2/29/20 08:00 98.0 86 18 123/73 (90) 99   

















Intake and Output  


 


 2/29/20 3/1/20





 19:00 07:00


 


Intake Total  515.000 ml


 


Output Total  500 ml


 


Balance  15.000 ml


 


  


 


Intake Oral  240 ml


 


IV Total  275.000 ml


 


Output Urine Total  500 ml


 


# Voids  2








Objective


General Appearance:  no acute distress


HEENT:  normocephalic, atraumatic, anicteric


Respiratory/Chest:  lungs clear, no respiratory distress


Cardiovascular:  normal rate


Abdomen:  soft, non tender


Extremities:  other - edema VLE +3 , L>R


Neurologic/Psychiatric:  alert


Musculoskeletal:  atrophy - BLE





Microbiology








 Date/Time


Source Procedure


Growth Status


 


 


 2/27/20 17:08


Blood Blood Culture - Preliminary


NO GROWTH AFTER 48 HOURS Resulted


 


 2/27/20 17:00


Blood Blood Culture - Preliminary


NO GROWTH AFTER 48 HOURS Resulted








Laboratory Tests


3/1/20 06:05: 


White Blood Count [Pending], Red Blood Count [Pending], Hemoglobin [Pending], 

Hematocrit [Pending], Mean Corpuscular Volume [Pending], Mean Corpuscular 

Hemoglobin [Pending], Mean Corpuscular Hemoglobin Concent [Pending], Red Cell 

Distribution Width [Pending], Platelet Count [Pending], Mean Platelet Volume [

Pending], Neutrophils (%) (Auto) [Pending], Lymphocytes (%) (Auto) [Pending], 

Monocytes (%) (Auto) [Pending], Eosinophils (%) (Auto) [Pending], Basophils (%) 

(Auto) [Pending], Sodium Level [Pending], Potassium Level [Pending], Chloride 

Level [Pending], Carbon Dioxide Level [Pending], Blood Urea Nitrogen [Pending], 

Creatinine [Pending], Estimat Glomerular Filtration Rate [Pending], Glucose 

Level [Pending], Calcium Level [Pending]





Current Medications








 Medications


  (Trade)  Dose


 Ordered  Sig/Imelda


 Route


 PRN Reason  Start Time


 Stop Time Status Last Admin


Dose Admin


 


 Acetaminophen


  (Tylenol)  1,000 mg  Q6H  PRN


 ORAL


 Mild Pain/Temp > 100.5  2/25/20 22:00


 3/26/20 21:59  2/25/20 22:33


 


 


 Aspirin


  (Ecotrin)  81 mg  DAILY


 ORAL


   2/26/20 09:00


 3/27/20 08:59  2/29/20 08:33


 


 


 Carvedilol


  (Coreg)  25 mg  BID


 ORAL


   2/26/20 09:00


 3/27/20 08:59  2/29/20 08:33


 


 


 Furosemide


  (Lasix)  40 mg  DAILY


 IV


   2/26/20 09:00


 3/27/20 08:59  2/29/20 08:34


 


 


 Lorazepam


  (Ativan 2mg/ml


 1ml)  2 mg  Q4H  PRN


 IV


 For Anxiety  2/28/20 12:15


 3/6/20 12:14   


 


 


 Lorazepam


  (Ativan)  1 mg  Q6H  PRN


 ORAL


 For Anxiety  2/25/20 22:00


 3/3/20 21:59   


 


 


 Losartan Potassium


  (Cozaar)  50 mg  DAILY


 ORAL


   2/26/20 09:00


 3/27/20 08:59  2/29/20 08:34


 


 


 Morphine Sulfate


  (Morphine


 Sulfate)  2 mg  Q4H  PRN


 IVP


 For severe Pain  2/28/20 15:15


 3/6/20 15:14  3/1/20 04:13


 


 


 Ondansetron HCl


  (Zofran)  4 mg  Q4H  PRN


 IVP


 Nausea & Vomiting  2/28/20 15:15


 3/29/20 15:14  3/1/20 04:13


 


 


 Rivaroxaban


  (Xarelto)  15 mg  BID


 ORAL


   2/26/20 18:00


 3/18/20 09:01  2/29/20 17:24


 


 


 Rivaroxaban


  (Xarelto)  20 mg  DAILY


 ORAL


   3/19/20 09:00


 4/18/20 08:59   


 


 


 Tramadol HCl


  (Ultram)  50 mg  Q6H  PRN


 ORAL


 Severe Pain (Pain Scale 7-10)  2/25/20 22:00


 3/3/20 21:59  2/29/20 08:33


 


 


 Vancomycin HCl


  (Vanco rx to


 dose)  1 ea  DAILY  PRN


 MISC


 Per rx protocol  2/25/20 22:00


 3/26/20 21:59   


 


 


 Vancomycin HCl 1


 gm/Dextrose  275 ml @ 


 183.708


 mls/hr  Q24H


 IVPB


   3/1/20 00:00


 3/6/20 00:00  2/29/20 23:54


 

















Lennie Rutledge NP Mar 1, 2020 07:54

## 2020-03-01 NOTE — NUR
NURSE NOTES:

Pt. awake at this time.  No complaints of pain at this time, breathing is even and 
unlabored.

## 2020-03-01 NOTE — INTERNAL MED PROGRESS NOTE
Subjective


Date of Service:  Mar 1, 2020


Physician Name


HayleySixto


Attending Physician


Chris Yarbrough MD





Current Medications








 Medications


  (Trade)  Dose


 Ordered  Sig/Imelda


 Route


 PRN Reason  Start Time


 Stop Time Status Last Admin


Dose Admin


 


 Acetaminophen


  (Tylenol)  1,000 mg  Q6H  PRN


 ORAL


 Mild Pain/Temp > 100.5  2/25/20 22:00


 3/26/20 21:59  2/25/20 22:33


 


 


 Aspirin


  (Ecotrin)  81 mg  DAILY


 ORAL


   2/26/20 09:00


 3/27/20 08:59  3/1/20 09:20


 


 


 Carvedilol


  (Coreg)  25 mg  BID


 ORAL


   2/26/20 09:00


 3/27/20 08:59  3/1/20 09:20


 


 


 Furosemide


  (Lasix)  40 mg  DAILY


 IV


   2/26/20 09:00


 3/27/20 08:59  3/1/20 09:21


 


 


 Lorazepam


  (Ativan 2mg/ml


 1ml)  2 mg  Q4H  PRN


 IV


 For Anxiety  2/28/20 12:15


 3/6/20 12:14   


 


 


 Lorazepam


  (Ativan)  1 mg  Q6H  PRN


 ORAL


 For Anxiety  2/25/20 22:00


 3/3/20 21:59   


 


 


 Losartan Potassium


  (Cozaar)  50 mg  DAILY


 ORAL


   2/26/20 09:00


 3/27/20 08:59  3/1/20 09:21


 


 


 Morphine Sulfate


  (Morphine


 Sulfate)  2 mg  Q4H  PRN


 IVP


 For severe Pain  2/28/20 15:15


 3/6/20 15:14  3/1/20 04:13


 


 


 Ondansetron HCl


  (Zofran)  4 mg  Q4H  PRN


 IVP


 Nausea & Vomiting  2/28/20 15:15


 3/29/20 15:14  3/1/20 04:13


 


 


 Rivaroxaban


  (Xarelto)  15 mg  BID


 ORAL


   2/26/20 18:00


 3/18/20 09:01  3/1/20 09:20


 


 


 Rivaroxaban


  (Xarelto)  20 mg  DAILY


 ORAL


   3/19/20 09:00


 4/18/20 08:59   


 


 


 Tramadol HCl


  (Ultram)  50 mg  Q6H  PRN


 ORAL


 Severe Pain (Pain Scale 7-10)  2/25/20 22:00


 3/3/20 21:59  2/29/20 08:33


 


 


 Vancomycin HCl


  (Vanco rx to


 dose)  1 ea  DAILY  PRN


 MISC


 Per rx protocol  2/25/20 22:00


 3/26/20 21:59   


 


 


 Vancomycin HCl 1


 gm/Dextrose  275 ml @ 


 183.708


 mls/hr  Q24H


 IVPB


   3/1/20 00:00


 3/6/20 00:00  2/29/20 23:54


 








Allergies:  


Coded Allergies:  


     No Known Allergies (Unverified , 9/5/19)


ROS Limited/Unobtainable:  No


Constitutional:  Reports: no symptoms


HEENT:  Reports: no symptoms


Cardiovascular:  Reports: no symptoms


Respiratory:  Reports: no symptoms


Gastrointestinal/Abdominal:  Reports: no symptoms


Genitourinary:  Reports: no symptoms


Neurologic/Psychiatric:  Reports: no symptoms


Subjective


86 YO F admitted with bilateral leg swelling and cellulitis bilat lower 

extremities.   Now acute deep venous thrombosis left leg.  Cover for Int Fredy-Dr Yarbrough





Objective





Last Vital Signs








  Date Time  Temp Pulse Resp B/P (MAP) Pulse Ox O2 Delivery O2 Flow Rate FiO2


 


3/1/20 09:21    132/66    


 


3/1/20 09:20  92      


 


3/1/20 09:00      Room Air  


 


3/1/20 08:00 98.7  20  98   











Laboratory Tests








Test


  3/1/20


06:05


 


White Blood Count


  12.0 K/UL


(4.8-10.8)  H


 


Red Blood Count


  3.45 M/UL


(4.20-5.40)  L


 


Hemoglobin


  9.8 G/DL


(12.0-16.0)  L


 


Hematocrit


  29.8 %


(37.0-47.0)  L


 


Mean Corpuscular Volume 87 FL (80-99)  


 


Mean Corpuscular Hemoglobin


  28.5 PG


(27.0-31.0)


 


Mean Corpuscular Hemoglobin


Concent 32.9 G/DL


(32.0-36.0)


 


Red Cell Distribution Width


  12.7 %


(11.6-14.8)


 


Platelet Count


  227 K/UL


(150-450)


 


Mean Platelet Volume


  6.2 FL


(6.5-10.1)  L


 


Neutrophils (%) (Auto)


  75.7 %


(45.0-75.0)  H


 


Lymphocytes (%) (Auto)


  12.2 %


(20.0-45.0)  L


 


Monocytes (%) (Auto)


  7.5 %


(1.0-10.0)


 


Eosinophils (%) (Auto)


  4.0 %


(0.0-3.0)  H


 


Basophils (%) (Auto)


  0.6 %


(0.0-2.0)


 


Sodium Level


  140 MMOL/L


(136-145)


 


Potassium Level


  4.2 MMOL/L


(3.5-5.1)


 


Chloride Level


  102 MMOL/L


()


 


Carbon Dioxide Level


  32 MMOL/L


(21-32)


 


Anion Gap


  6 mmol/L


(5-15)


 


Blood Urea Nitrogen


  25 mg/dL


(7-18)  H


 


Creatinine


  1.7 MG/DL


(0.55-1.30)  H


 


Estimat Glomerular Filtration


Rate 34.8 mL/min


(>60)


 


Glucose Level


  101 MG/DL


()


 


Calcium Level


  9.1 MG/DL


(8.5-10.1)











Microbiology








 Date/Time


Source Procedure


Growth Status


 


 


 2/27/20 17:08


Blood Blood Culture - Preliminary


NO GROWTH AFTER 48 HOURS Resulted


 


 2/27/20 17:00


Blood Blood Culture - Preliminary


NO GROWTH AFTER 48 HOURS Resulted

















Intake and Output  


 


 2/29/20 3/1/20





 19:00 07:00


 


Intake Total  515.000 ml


 


Output Total  500 ml


 


Balance  15.000 ml


 


  


 


Intake Oral  240 ml


 


IV Total  275.000 ml


 


Output Urine Total  500 ml


 


# Voids  2








Objective


PHYSICAL EXAMINATION:


GENERAL:  The patient is a well-developed, well-nourished 


female, in no apparent distress.


HEENT:  Eyes, pupils equal and responsive to light and accommodation.


Extraocular movements are intact.


NECK:  Supple without lymphadenopathy.


CHEST:  Lungs are clear to auscultation bilaterally without wheezes or


rales.


CARDIOVASCULAR:  Regular rhythm and rate.  S1, S2 normal without murmurs,


rubs, or gallops.


ABDOMEN:  Soft, nontender, and nondistended.  Positive bowel sounds.  No


evidence of hepatosplenomegaly.  Currently, no rebound or guarding.


EXTREMITIES:  A 2+ pitting edema from the ankles to the knees bilaterally


with erythema of the left anterior calf from the ankle to the knee, left


greater than right.





Assessment/Plan


Assessment/Plan


ASSESSMENT:  This is an 85-year-old  female:





1. Cellulitis of bilateral lower extremities.


2. Lymphedema bilateral lower extremities.


3. Bilateral leg pain.


4. Hypertension.


5.  Acute deep venous thrombosis left common femoral to superficial


femoral vein





TREATMENT:


1. Cellulitis of the bilateral lower extremities.


ABX= vancomycin and S/P ceftriaxone.  An


Infectious Disease consultation has been obtained with Dr. Elizabeth.  We


will follow recommendations of Infectious Disease.


2. Hypertension.  Continue Cozaar and Coreg as above.


3.  Start xarelto


4.  Discharge planning











Sixto Hardy MD Mar 1, 2020 13:28

## 2020-03-01 NOTE — NUR
NURSE NOTES:

Pt AAOx4, on room air, no c/o of pain at this time, no signs of respiratory distress.  IV 
site right wrist 24g, asymptomatic, intact, and patent; saline locked.  Skin tear noted on 
left shin, no s/s of infection. Bed is low and locked, side rails x2 up, bed alarm active, 
and call light is in reach.

## 2020-03-01 NOTE — NUR
NURSE NOTES:

received patient in bed, awake, alert, no complaint of pain or discomfort, no signs of 
distress. R wrist IV access saline locked. Bed locked at lowest position possible, call 
light within easy reach, siderails up x2, on bed alarm. Will continue to monitor patient and 
follow up on the POC.

## 2020-03-02 VITALS — DIASTOLIC BLOOD PRESSURE: 52 MMHG | SYSTOLIC BLOOD PRESSURE: 120 MMHG

## 2020-03-02 VITALS — DIASTOLIC BLOOD PRESSURE: 53 MMHG | SYSTOLIC BLOOD PRESSURE: 123 MMHG

## 2020-03-02 VITALS — DIASTOLIC BLOOD PRESSURE: 54 MMHG | SYSTOLIC BLOOD PRESSURE: 139 MMHG

## 2020-03-02 VITALS — SYSTOLIC BLOOD PRESSURE: 129 MMHG | DIASTOLIC BLOOD PRESSURE: 59 MMHG

## 2020-03-02 VITALS — DIASTOLIC BLOOD PRESSURE: 59 MMHG | SYSTOLIC BLOOD PRESSURE: 148 MMHG

## 2020-03-02 VITALS — DIASTOLIC BLOOD PRESSURE: 58 MMHG | SYSTOLIC BLOOD PRESSURE: 130 MMHG

## 2020-03-02 LAB
% IRON SATURATION: 9 % (ref 15–50)
ADD MANUAL DIFF: NO
ALBUMIN SERPL-MCNC: 2.7 G/DL (ref 3.4–5)
ALP SERPL-CCNC: 53 U/L (ref 46–116)
ALT SERPL-CCNC: 17 U/L (ref 12–78)
ANION GAP SERPL CALC-SCNC: 7 MMOL/L (ref 5–15)
AST SERPL-CCNC: 15 U/L (ref 15–37)
BASOPHILS NFR BLD AUTO: 0.4 % (ref 0–2)
BILIRUB DIRECT SERPL-MCNC: < 0.1 MG/DL (ref 0–0.3)
BILIRUB SERPL-MCNC: 0.5 MG/DL (ref 0.2–1)
BUN SERPL-MCNC: 30 MG/DL (ref 7–18)
CALCIUM SERPL-MCNC: 8.9 MG/DL (ref 8.5–10.1)
CHLORIDE SERPL-SCNC: 103 MMOL/L (ref 98–107)
CHOLEST SERPL-MCNC: 150 MG/DL (ref ?–200)
CO2 SERPL-SCNC: 31 MMOL/L (ref 21–32)
CREAT SERPL-MCNC: 1.9 MG/DL (ref 0.55–1.3)
EOSINOPHIL NFR BLD AUTO: 4.3 % (ref 0–3)
ERYTHROCYTE [DISTWIDTH] IN BLOOD BY AUTOMATED COUNT: 12.5 % (ref 11.6–14.8)
FERRITIN SERPL-MCNC: 174 NG/ML (ref 8–388)
HCT VFR BLD CALC: 28.3 % (ref 37–47)
HDLC SERPL-MCNC: 52 MG/DL (ref 40–60)
HGB BLD-MCNC: 9.5 G/DL (ref 12–16)
IRON SERPL-MCNC: 18 UG/DL (ref 50–175)
LYMPHOCYTES NFR BLD AUTO: 13.9 % (ref 20–45)
MCV RBC AUTO: 86 FL (ref 80–99)
MONOCYTES NFR BLD AUTO: 7.1 % (ref 1–10)
NEUTROPHILS NFR BLD AUTO: 74.3 % (ref 45–75)
PHOSPHATE SERPL-MCNC: 3.7 MG/DL (ref 2.5–4.9)
PLATELET # BLD: 216 K/UL (ref 150–450)
POTASSIUM SERPL-SCNC: 4.8 MMOL/L (ref 3.5–5.1)
RBC # BLD AUTO: 3.3 M/UL (ref 4.2–5.4)
SODIUM SERPL-SCNC: 141 MMOL/L (ref 136–145)
TIBC SERPL-MCNC: 191 UG/DL (ref 250–450)
TRIGL SERPL-MCNC: 70 MG/DL (ref 30–150)
UNSATURATED IRON BINDING: 173 UG/DL (ref 112–346)
WBC # BLD AUTO: 11.3 K/UL (ref 4.8–10.8)

## 2020-03-02 RX ADMIN — RIVAROXABAN SCH MG: 15 TABLET, FILM COATED ORAL at 09:02

## 2020-03-02 RX ADMIN — DOCUSATE SODIUM SCH MG: 100 CAPSULE, LIQUID FILLED ORAL at 17:38

## 2020-03-02 RX ADMIN — DAPTOMYCIN SCH MLS/HR: 500 INJECTION, POWDER, LYOPHILIZED, FOR SOLUTION INTRAVENOUS at 18:50

## 2020-03-02 RX ADMIN — ASPIRIN SCH MG: 81 TABLET, DELAYED RELEASE ORAL at 09:01

## 2020-03-02 RX ADMIN — RIVAROXABAN SCH MG: 15 TABLET, FILM COATED ORAL at 17:39

## 2020-03-02 RX ADMIN — CARVEDILOL SCH MG: 25 TABLET, FILM COATED ORAL at 17:38

## 2020-03-02 RX ADMIN — CARVEDILOL SCH MG: 25 TABLET, FILM COATED ORAL at 09:02

## 2020-03-02 RX ADMIN — LOSARTAN POTASSIUM SCH MG: 50 TABLET, FILM COATED ORAL at 09:02

## 2020-03-02 RX ADMIN — DOCUSATE SODIUM SCH MG: 100 CAPSULE, LIQUID FILLED ORAL at 14:05

## 2020-03-02 NOTE — INFECTIOUS DISEASES PROG NOTE
Assessment/Plan


Assessment/Plan


ASSESSMENT:  The patient is an 85-year-old female with 





Lower extremity lymphedema/cellulitis


Left lower extremity edema.


Normal white blood cells and afebrile











MIKE 


DVT: Acute DVT in the left common femoral vein and superficial femoral vein


Hypertension.


Bilateral lymphedema








PLAN:








Continue the patient on IV Datpo # 1 and DC IV Vancomycin # 5 ( MIKE )





    2/27 Sp  Rocephin # 1 


Monitor CBC


Monitor BMP.


Blood cultures.





Subjective


Allergies:  


Coded Allergies:  


     No Known Allergies (Unverified , 9/5/19)


Subjective


comfortable no acute event





Objective


Vital Signs





Last 24 Hour Vital Signs








  Date Time  Temp Pulse Resp B/P (MAP) Pulse Ox O2 Delivery O2 Flow Rate FiO2


 


3/2/20 12:00 98.3 80 18 123/53 (76) 96   


 


3/2/20 09:02    134/54    


 


3/2/20 09:02  85  134/54    


 


3/2/20 08:00 98.1 85 18 134/54 (80) 94   


 


3/2/20 04:00 97.9 82 17 130/58 (82) 98   


 


3/2/20 00:00 98.0 82 17 120/52 (74) 98   


 


3/1/20 21:22      Room Air  


 


3/1/20 20:00 98.2 79 19 114/49 (70) 98   


 


3/1/20 17:58  88  130/70    


 


3/1/20 16:00 98.3 88 19 130/70 (90) 98   








Height (Feet):  5


Height (Inches):  2.00


Weight (Pounds):  227


Respiratory/Chest:  normal breath sounds


Cardiovascular:  regular rhythm


Abdomen:  soft, non tender





Laboratory Tests








Test


  3/1/20


23:12 3/2/20


04:15 3/2/20


04:45


 


Vancomycin Level Trough


  20.7 ug/mL


(5.0-12.0)  H 


  


 


 


White Blood Count


  


  11.3 K/UL


(4.8-10.8)  H 


 


 


Red Blood Count


  


  3.30 M/UL


(4.20-5.40)  L 


 


 


Hemoglobin


  


  9.5 G/DL


(12.0-16.0)  L 


 


 


Hematocrit


  


  28.3 %


(37.0-47.0)  L 


 


 


Mean Corpuscular Volume  86 FL (80-99)   


 


Mean Corpuscular Hemoglobin


  


  28.6 PG


(27.0-31.0) 


 


 


Mean Corpuscular Hemoglobin


Concent 


  33.4 G/DL


(32.0-36.0) 


 


 


Red Cell Distribution Width


  


  12.5 %


(11.6-14.8) 


 


 


Platelet Count


  


  216 K/UL


(150-450) 


 


 


Mean Platelet Volume


  


  5.2 FL


(6.5-10.1)  L 


 


 


Neutrophils (%) (Auto)


  


  74.3 %


(45.0-75.0) 


 


 


Lymphocytes (%) (Auto)


  


  13.9 %


(20.0-45.0)  L 


 


 


Monocytes (%) (Auto)


  


  7.1 %


(1.0-10.0) 


 


 


Eosinophils (%) (Auto)


  


  4.3 %


(0.0-3.0)  H 


 


 


Basophils (%) (Auto)


  


  0.4 %


(0.0-2.0) 


 


 


Sodium Level


  


  141 MMOL/L


(136-145) 


 


 


Potassium Level


  


  4.8 MMOL/L


(3.5-5.1) 


 


 


Chloride Level


  


  103 MMOL/L


() 


 


 


Carbon Dioxide Level


  


  31 MMOL/L


(21-32) 


 


 


Anion Gap


  


  7 mmol/L


(5-15) 


 


 


Blood Urea Nitrogen


  


  30 mg/dL


(7-18)  H 


 


 


Creatinine


  


  1.9 MG/DL


(0.55-1.30)  H 


 


 


Estimat Glomerular Filtration


Rate 


  30.5 mL/min


(>60) 


 


 


Glucose Level


  


  81 MG/DL


() 


 


 


Calcium Level


  


  8.9 MG/DL


(8.5-10.1) 


 


 


Hemoglobin A1c


  


  


  5.8 %


(4.3-6.0)


 


Uric Acid


  


  


  10.0 MG/DL


(2.6-7.2)  H


 


Phosphorus Level


  


  


  3.7 MG/DL


(2.5-4.9)


 


Magnesium Level


  


  


  1.9 MG/DL


(1.8-2.4)


 


Iron Level


  


  


  18 ug/dL


()  L


 


Total Iron Binding Capacity


  


  


  191 ug/dL


(250-450)  L


 


Percent Iron Saturation   9 % (15-50)  L


 


Unsaturated Iron Binding


  


  


  173 ug/dL


(112-346)


 


Ferritin


  


  


  174 NG/ML


(8-388)


 


Total Bilirubin


  


  


  0.5 MG/DL


(0.2-1.0)


 


Direct Bilirubin


  


  


  < 0.1 MG/DL


(0.0-0.3)


 


Aspartate Amino Transf


(AST/SGOT) 


  


  15 U/L (15-37)


 


 


Alanine Aminotransferase


(ALT/SGPT) 


  


  17 U/L (12-78)


 


 


Alkaline Phosphatase


  


  


  53 U/L


()


 


C-Reactive Protein,


Quantitative 


  


  17.4 mg/dL


(0.00-0.90)  H


 


Total Protein


  


  


  6.4 G/DL


(6.4-8.2)


 


Albumin


  


  


  2.7 G/DL


(3.4-5.0)  L


 


Triglycerides Level


  


  


  70 MG/DL


()


 


Cholesterol Level


  


  


  150 MG/DL (<


200)


 


LDL Cholesterol


  


  


  85 mg/dL


(<100)


 


HDL Cholesterol


  


  


  52 MG/DL


(40-60)


 


Cholesterol/HDL Ratio


  


  


  2.9 (3.3-4.4)


L


 


Vitamin B12 Level


  


  


  294 PG/ML


(193-986)


 


Folate


  


  


  7.2 NG/ML


(8.6-58.9)  L











Current Medications








 Medications


  (Trade)  Dose


 Ordered  Sig/Imelda


 Route


 PRN Reason  Start Time


 Stop Time Status Last Admin


Dose Admin


 


 Acetaminophen


  (Tylenol)  1,000 mg  Q6H  PRN


 ORAL


 Mild Pain/Temp > 100.5  2/25/20 22:00


 3/26/20 21:59  2/25/20 22:33


 


 


 Aspirin


  (Ecotrin)  81 mg  DAILY


 ORAL


   2/26/20 09:00


 3/27/20 08:59  3/2/20 09:01


 


 


 Carvedilol


  (Coreg)  25 mg  BID


 ORAL


   2/26/20 09:00


 3/27/20 08:59  3/2/20 09:02


 


 


 Docusate Sodium


  (Colace)  100 mg  THREE TIMES A  DAY


 ORAL


   3/2/20 13:00


 4/1/20 12:59   


 


 


 Folic Acid


  (Folate)  1 mg  DAILY


 ORAL


   3/2/20 10:00


 4/1/20 09:59   


 


 


 Lorazepam


  (Ativan 2mg/ml


 1ml)  2 mg  Q4H  PRN


 IV


 For Anxiety  2/28/20 12:15


 3/6/20 12:14   


 


 


 Lorazepam


  (Ativan)  1 mg  Q6H  PRN


 ORAL


 For Anxiety  2/25/20 22:00


 3/3/20 21:59   


 


 


 Losartan Potassium


  (Cozaar)  50 mg  DAILY


 ORAL


   2/26/20 09:00


 3/27/20 08:59  3/2/20 09:02


 


 


 Morphine Sulfate


  (Morphine


 Sulfate)  2 mg  Q4H  PRN


 IVP


 For severe Pain  2/28/20 15:15


 3/6/20 15:14  3/1/20 04:13


 


 


 Ondansetron HCl


  (Zofran)  4 mg  Q4H  PRN


 IVP


 Nausea & Vomiting  2/28/20 15:15


 3/29/20 15:14  3/1/20 04:13


 


 


 Pantoprazole


  (Protonix)  40 mg  EVERY 12  HOURS


 ORAL


   3/2/20 21:00


 4/1/20 20:59   


 


 


 Rivaroxaban


  (Xarelto)  15 mg  BID


 ORAL


   2/26/20 18:00


 3/18/20 09:01  3/2/20 09:02


 


 


 Rivaroxaban


  (Xarelto)  20 mg  DAILY


 ORAL


   3/19/20 09:00


 4/18/20 08:59   


 


 


 Tramadol HCl


  (Ultram)  50 mg  Q6H  PRN


 ORAL


 Severe Pain (Pain Scale 7-10)  2/25/20 22:00


 3/3/20 21:59  2/29/20 08:33


 


 


 Vancomycin HCl


  (Vanco rx to


 dose)  1 ea  DAILY  PRN


 MISC


 Per rx protocol  2/25/20 22:00


 3/26/20 21:59   


 


 


 Vancomycin HCl 1


 gm/Dextrose  275 ml @ 


 183.708


 mls/hr  Q36H


 IVPB


   3/2/20 12:00


 3/6/20 00:00   


 

















Jorge Elizabeth MD Mar 2, 2020 13:31

## 2020-03-02 NOTE — INTERNAL MED PROGRESS NOTE
Subjective


Date of Service:  Mar 2, 2020


Physician Name


HayleySixto


Attending Physician


Chris Yarbrough MD





Current Medications








 Medications


  (Trade)  Dose


 Ordered  Sig/Imelda


 Route


 PRN Reason  Start Time


 Stop Time Status Last Admin


Dose Admin


 


 Acetaminophen


  (Tylenol)  1,000 mg  Q6H  PRN


 ORAL


 Mild Pain/Temp > 100.5  2/25/20 22:00


 3/26/20 21:59  2/25/20 22:33


 


 


 Aspirin


  (Ecotrin)  81 mg  DAILY


 ORAL


   2/26/20 09:00


 3/27/20 08:59  3/2/20 09:01


 


 


 Carvedilol


  (Coreg)  25 mg  BID


 ORAL


   2/26/20 09:00


 3/27/20 08:59  3/2/20 17:38


 


 


 Daptomycin 400 mg/


 Sodium Chloride  55 ml @ 


 100 mls/hr  Q48H


 IV


   3/2/20 18:00


 3/9/20 17:59  3/2/20 18:50


 


 


 Docusate Sodium


  (Colace)  100 mg  THREE TIMES A  DAY


 ORAL


   3/2/20 13:00


 4/1/20 12:59  3/2/20 17:38


 


 


 Folic Acid


  (Folate)  1 mg  DAILY


 ORAL


   3/2/20 10:00


 4/1/20 09:59  3/2/20 14:05


 


 


 Lorazepam


  (Ativan 2mg/ml


 1ml)  2 mg  Q4H  PRN


 IV


 For Anxiety  2/28/20 12:15


 3/6/20 12:14   


 


 


 Lorazepam


  (Ativan)  1 mg  Q6H  PRN


 ORAL


 For Anxiety  2/25/20 22:00


 3/3/20 21:59   


 


 


 Losartan Potassium


  (Cozaar)  50 mg  DAILY


 ORAL


   2/26/20 09:00


 3/27/20 08:59  3/2/20 09:02


 


 


 Morphine Sulfate


  (Morphine


 Sulfate)  2 mg  Q4H  PRN


 IVP


 For severe Pain  2/28/20 15:15


 3/6/20 15:14  3/1/20 04:13


 


 


 Ondansetron HCl


  (Zofran)  4 mg  Q4H  PRN


 IVP


 Nausea & Vomiting  2/28/20 15:15


 3/29/20 15:14  3/1/20 04:13


 


 


 Pantoprazole


  (Protonix)  40 mg  EVERY 12  HOURS


 ORAL


   3/2/20 21:00


 4/1/20 20:59   


 


 


 Rivaroxaban


  (Xarelto)  15 mg  BID


 ORAL


   2/26/20 18:00


 3/18/20 09:01  3/2/20 17:39


 


 


 Rivaroxaban


  (Xarelto)  20 mg  DAILY


 ORAL


   3/19/20 09:00


 4/18/20 08:59   


 


 


 Tramadol HCl


  (Ultram)  50 mg  Q6H  PRN


 ORAL


 Severe Pain (Pain Scale 7-10)  2/25/20 22:00


 3/3/20 21:59  2/29/20 08:33


 








Allergies:  


Coded Allergies:  


     No Known Allergies (Unverified , 9/5/19)


ROS Limited/Unobtainable:  No


Constitutional:  Reports: no symptoms


HEENT:  Reports: no symptoms


Cardiovascular:  Reports: no symptoms


Respiratory:  Reports: no symptoms


Gastrointestinal/Abdominal:  Reports: no symptoms


Genitourinary:  Reports: no symptoms


Neurologic/Psychiatric:  Reports: no symptoms


Subjective


84 YO F admitted with bilateral leg swelling and cellulitis bilat lower 

extremities.   Now acute deep venous thrombosis left leg.  Cover for Int Fredy-Dr Yarbrough





Objective





Last Vital Signs








  Date Time  Temp Pulse Resp B/P (MAP) Pulse Ox O2 Delivery O2 Flow Rate FiO2


 


3/2/20 17:38  81  148/59    


 


3/2/20 16:00 98.6  18  94   


 


3/2/20 09:00      Room Air  











Laboratory Tests








Test


  3/1/20


23:12 3/2/20


04:15 3/2/20


04:45


 


Vancomycin Level Trough


  20.7 ug/mL


(5.0-12.0)  H 


  


 


 


White Blood Count


  


  11.3 K/UL


(4.8-10.8)  H 


 


 


Red Blood Count


  


  3.30 M/UL


(4.20-5.40)  L 


 


 


Hemoglobin


  


  9.5 G/DL


(12.0-16.0)  L 


 


 


Hematocrit


  


  28.3 %


(37.0-47.0)  L 


 


 


Mean Corpuscular Volume  86 FL (80-99)   


 


Mean Corpuscular Hemoglobin


  


  28.6 PG


(27.0-31.0) 


 


 


Mean Corpuscular Hemoglobin


Concent 


  33.4 G/DL


(32.0-36.0) 


 


 


Red Cell Distribution Width


  


  12.5 %


(11.6-14.8) 


 


 


Platelet Count


  


  216 K/UL


(150-450) 


 


 


Mean Platelet Volume


  


  5.2 FL


(6.5-10.1)  L 


 


 


Neutrophils (%) (Auto)


  


  74.3 %


(45.0-75.0) 


 


 


Lymphocytes (%) (Auto)


  


  13.9 %


(20.0-45.0)  L 


 


 


Monocytes (%) (Auto)


  


  7.1 %


(1.0-10.0) 


 


 


Eosinophils (%) (Auto)


  


  4.3 %


(0.0-3.0)  H 


 


 


Basophils (%) (Auto)


  


  0.4 %


(0.0-2.0) 


 


 


Sodium Level


  


  141 MMOL/L


(136-145) 


 


 


Potassium Level


  


  4.8 MMOL/L


(3.5-5.1) 


 


 


Chloride Level


  


  103 MMOL/L


() 


 


 


Carbon Dioxide Level


  


  31 MMOL/L


(21-32) 


 


 


Anion Gap


  


  7 mmol/L


(5-15) 


 


 


Blood Urea Nitrogen


  


  30 mg/dL


(7-18)  H 


 


 


Creatinine


  


  1.9 MG/DL


(0.55-1.30)  H 


 


 


Estimat Glomerular Filtration


Rate 


  30.5 mL/min


(>60) 


 


 


Glucose Level


  


  81 MG/DL


() 


 


 


Calcium Level


  


  8.9 MG/DL


(8.5-10.1) 


 


 


Hemoglobin A1c


  


  


  5.8 %


(4.3-6.0)


 


Uric Acid


  


  


  10.0 MG/DL


(2.6-7.2)  H


 


Phosphorus Level


  


  


  3.7 MG/DL


(2.5-4.9)


 


Magnesium Level


  


  


  1.9 MG/DL


(1.8-2.4)


 


Iron Level


  


  


  18 ug/dL


()  L


 


Total Iron Binding Capacity


  


  


  191 ug/dL


(250-450)  L


 


Percent Iron Saturation   9 % (15-50)  L


 


Unsaturated Iron Binding


  


  


  173 ug/dL


(112-346)


 


Ferritin


  


  


  174 NG/ML


(8-388)


 


Total Bilirubin


  


  


  0.5 MG/DL


(0.2-1.0)


 


Direct Bilirubin


  


  


  < 0.1 MG/DL


(0.0-0.3)


 


Aspartate Amino Transf


(AST/SGOT) 


  


  15 U/L (15-37)


 


 


Alanine Aminotransferase


(ALT/SGPT) 


  


  17 U/L (12-78)


 


 


Alkaline Phosphatase


  


  


  53 U/L


()


 


C-Reactive Protein,


Quantitative 


  


  17.4 mg/dL


(0.00-0.90)  H


 


Total Protein


  


  


  6.4 G/DL


(6.4-8.2)


 


Albumin


  


  


  2.7 G/DL


(3.4-5.0)  L


 


Triglycerides Level


  


  


  70 MG/DL


()


 


Cholesterol Level


  


  


  150 MG/DL (<


200)


 


LDL Cholesterol


  


  


  85 mg/dL


(<100)


 


HDL Cholesterol


  


  


  52 MG/DL


(40-60)


 


Cholesterol/HDL Ratio


  


  


  2.9 (3.3-4.4)


L


 


Vitamin B12 Level


  


  


  294 PG/ML


(193-986)


 


Folate


  


  


  7.2 NG/ML


(8.6-58.9)  L

















Intake and Output  


 


 3/1/20 3/2/20





 19:00 07:00


 


Intake Total 1000 ml 


 


Output Total  1150 ml


 


Balance 1000 ml -1150 ml


 


  


 


Intake Oral 1000 ml 


 


Output Urine Total  1150 ml


 


# Voids  2








Objective


PHYSICAL EXAMINATION:


GENERAL:  The patient is a well-developed, well-nourished 


female, in no apparent distress.


HEENT:  Eyes, pupils equal and responsive to light and accommodation.


Extraocular movements are intact.


NECK:  Supple without lymphadenopathy.


CHEST:  Lungs are clear to auscultation bilaterally without wheezes or


rales.


CARDIOVASCULAR:  Regular rhythm and rate.  S1, S2 normal without murmurs,


rubs, or gallops.


ABDOMEN:  Soft, nontender, and nondistended.  Positive bowel sounds.  No


evidence of hepatosplenomegaly.  Currently, no rebound or guarding.


EXTREMITIES:  A 2+ pitting edema from the ankles to the knees bilaterally


with erythema of the left anterior calf from the ankle to the knee, left


greater than right.





Assessment/Plan


Assessment/Plan


ASSESSMENT:  This is an 85-year-old  female:





1. Cellulitis of bilateral lower extremities.


2. Lymphedema bilateral lower extremities.


3. Bilateral leg pain.


4. Hypertension.


5.  Acute deep venous thrombosis left common femoral to superficial


femoral vein





TREATMENT:


1. Cellulitis of the bilateral lower extremities.


ABX= vancomycin and S/P ceftriaxone.  An


Infectious Disease consultation has been obtained with Dr. Elizabeth.  We


will follow recommendations of Infectious Disease.


2. Hypertension.  Continue Cozaar and Coreg as above.


3.  Start xarelto


4.  Discharge planning : skilled nursing fac











Sixto Hardy MD Mar 2, 2020 19:36

## 2020-03-02 NOTE — NUR
NURSE NOTES:



Received patient awake, alert, verbal, resting in bed, comfortable, kept clean and dry.

## 2020-03-02 NOTE — NUR
CASE MANAGEMENT:REVIEW



SI;BLE CELLULITIS. BLE LYMPHEDEMA. BLL PAIN. LLE AC DEEP VENOUS THROMBOSIS.

98.3   85   18   134/54   94% ON RA

WBC 11.3   H/H 9.5/29.3   UR ACID 10.0   BUN 30   CR 1.9   



IS;XARELTO PO QD

IRON SUCROSE IV ONCE

DAPTOMYCIN IV Q48 HRS

PROTONIX PO Q12 HRS

COREG PO BID

COZAAR PO QD



******MED SURG STATUS



DCP;SNF PLACEMENT VS ARU

## 2020-03-02 NOTE — NUR
CASE MANAGEMENT NOTE



SPOKE WITH PATIENT AT BEDSIDE. STATES SHE CURRENTLY RESIDES ALONE. ANTICIPATING FOR SOMEONE 
TO MOVE IN TO BE ABLE TO ASSIST WITH HER CARE. INFORMED OF MD ORDER FOR ARU VS SNF. PATIENT 
IN AGREEMENT FOR PLACEMENT. STATES SHE PREFERS SNF PLACEMENT AT THIS TIME FOR PT REHAB.

## 2020-03-02 NOTE — CONSULTATION
Consult Note


Consult Note





I was asked to evaluate the patient at the request of Dr Yarbrough for rising serum 

creatinine.


Patient was admitted 6 days ago and serum creatinine ashu from 1 to 1.9.





The patient is an 85-year-old  female,


who initially presented with a chief complaint of bilateral leg pain and 

swelling.


The patient was admitted to [facility] in September 2019, for bilateral leg swelling.  The patient was 

diagnosed with chronic lymphedema of bilateral lower extremities.





The patient was initially transported to [facility] emergency room


via EMS.  The patient is transferred to [facility] for


insurance purposes.  





Patient interviewed


Patient examined


Data reviewed





.


Assessment/Plan





Acute renal failure as serum creatinine ashu from 1 from the date of admission 

TO 1.9 today.


Etiology is most likely overdiuresis and and partly treatment with vancomycin.


Anemia.


Other conditions:


1. Cellulitis of bilateral lower extremities.


2. Lymphedema bilateral lower extremities.


3. Bilateral leg pain.


4. Hypertension.


5.  Acute deep venous thrombosis left common femoral to superficial femoral vein





Suggestions:





Hold Lasix.


Kidney ultrasound.


Urine analysis since none was done since admission.


Monitor renal parameters.


Chest x-ray.


Monitor Vanco levels.


Per orders.











Neville Marshall MD Mar 2, 2020 09:41

## 2020-03-02 NOTE — NUR
NURSE NOTES:

received patient in bed, asleep, no signs of distress. R wrist IV access saline locked. Bed 
locked at lowest position possible, call light within easy reach, siderails up x2, on bed 
alarm. Will continue to monitor patient and follow up on the POC.

## 2020-03-02 NOTE — NUR
P.T Note:

P.T consult received. P.T evaluation completed and tx initiated. Please refer to P.T 
evaluation for current functional status and POC. Pt is alert,oriented to self/person, place 
and situation but not to time. Pt is pleasant and cooperative. Pt. presented c/o generalized 
weakness  and B lower leg pain aggravated by touch pressure and movement. Pt currently 
require MOD A X 1 and extended time to turn/roll using the bed rail and mod-max a x 1 for 
supine to/from sitting. Pt was able to sit at the EOB w/o support however to weak and 
fatigued  to stand and transfer.  Pt will benefit from skilled P.T service to increase her 
strength and endurance to increase her activity tolerance , mobility independence and 
safety. Recommend SNF for further rehab intervention VS acute rehab at  [facility].

## 2020-03-02 NOTE — PULMONOLOGY PROGRESS NOTE
Assessment/Plan


Problems:  


(1) ATN (acute tubular necrosis)


(2) Cellulitis


(3) Severe anemia


(4) Peripheral edema


(5) Elephantiasis


(6) Mild pulmonary hypertension


(7) Morbid obesity


(8) History of hypertension


Assessment/Plan


renal studies


check electrolytes


f/u renal US


wound care,


iv abx


podiatry


monitor BP


f/u stool for OB


symptomatic treatment.





Subjective


ROS Limited/Unobtainable:  No


Interval Events:  no new complains


Allergies:  


Coded Allergies:  


     No Known Allergies (Unverified , 9/5/19)





Objective





Last 24 Hour Vital Signs








  Date Time  Temp Pulse Resp B/P (MAP) Pulse Ox O2 Delivery O2 Flow Rate FiO2


 


3/2/20 12:00 98.3 80 18 123/53 (76) 96   


 


3/2/20 09:02    134/54    


 


3/2/20 09:02  85  134/54    


 


3/2/20 08:00 98.1 85 18 134/54 (80) 94   


 


3/2/20 04:00 97.9 82 17 130/58 (82) 98   


 


3/2/20 00:00 98.0 82 17 120/52 (74) 98   


 


3/1/20 21:22      Room Air  


 


3/1/20 20:00 98.2 79 19 114/49 (70) 98   


 


3/1/20 17:58  88  130/70    


 


3/1/20 16:00 98.3 88 19 130/70 (90) 98   

















Intake and Output  


 


 3/1/20 3/2/20





 19:00 07:00


 


Intake Total 1000 ml 


 


Output Total  1150 ml


 


Balance 1000 ml -1150 ml


 


  


 


Intake Oral 1000 ml 


 


Output Urine Total  1150 ml


 


# Voids  2








General Appearance:  WD/WN


HEENT:  normocephalic, atraumatic


Respiratory/Chest:  chest wall non-tender, lungs clear


Breasts:  no masses


Cardiovascular:  normal peripheral pulses


Abdomen:  normal bowel sounds, non distended


Extremities:  no cyanosis


Laboratory Tests


3/1/20 23:12: Vancomycin Level Trough 20.7H


3/2/20 04:15: 


White Blood Count 11.3H, Red Blood Count 3.30L, Hemoglobin 9.5L, Hematocrit 

28.3L, Mean Corpuscular Volume 86, Mean Corpuscular Hemoglobin 28.6, Mean 

Corpuscular Hemoglobin Concent 33.4, Red Cell Distribution Width 12.5, Platelet 

Count 216, Mean Platelet Volume 5.2L, Neutrophils (%) (Auto) 74.3, Lymphocytes (

%) (Auto) 13.9L, Monocytes (%) (Auto) 7.1, Eosinophils (%) (Auto) 4.3H, 

Basophils (%) (Auto) 0.4, Sodium Level 141, Potassium Level 4.8, Chloride Level 

103, Carbon Dioxide Level 31, Anion Gap 7, Blood Urea Nitrogen 30H, Creatinine 

1.9H, Estimat Glomerular Filtration Rate 30.5, Glucose Level 81, Calcium Level 

8.9


3/2/20 04:45: 


Hemoglobin A1c 5.8, Uric Acid 10.0H, Phosphorus Level 3.7, Magnesium Level 1.9, 

Iron Level 18L, Total Iron Binding Capacity 191L, Percent Iron Saturation 9L, 

Unsaturated Iron Binding 173, Ferritin 174, Total Bilirubin 0.5, Direct 

Bilirubin < 0.1, Aspartate Amino Transf (AST/SGOT) 15, Alanine Aminotransferase 

(ALT/SGPT) 17, Alkaline Phosphatase 53, C-Reactive Protein, Quantitative 17.4H, 

Total Protein 6.4, Albumin 2.7L, Triglycerides Level 70, Cholesterol Level 150, 

LDL Cholesterol 85, HDL Cholesterol 52, Cholesterol/HDL Ratio 2.9L, Vitamin B12 

Level 294, Folate 7.2L





Current Medications








 Medications


  (Trade)  Dose


 Ordered  Sig/Imelda


 Route


 PRN Reason  Start Time


 Stop Time Status Last Admin


Dose Admin


 


 Acetaminophen


  (Tylenol)  1,000 mg  Q6H  PRN


 ORAL


 Mild Pain/Temp > 100.5  2/25/20 22:00


 3/26/20 21:59  2/25/20 22:33


 


 


 Aspirin


  (Ecotrin)  81 mg  DAILY


 ORAL


   2/26/20 09:00


 3/27/20 08:59  3/2/20 09:01


 


 


 Carvedilol


  (Coreg)  25 mg  BID


 ORAL


   2/26/20 09:00


 3/27/20 08:59  3/2/20 09:02


 


 


 Docusate Sodium


  (Colace)  100 mg  THREE TIMES A  DAY


 ORAL


   3/2/20 13:00


 4/1/20 12:59   


 


 


 Folic Acid


  (Folate)  1 mg  DAILY


 ORAL


   3/2/20 10:00


 4/1/20 09:59   


 


 


 Lorazepam


  (Ativan 2mg/ml


 1ml)  2 mg  Q4H  PRN


 IV


 For Anxiety  2/28/20 12:15


 3/6/20 12:14   


 


 


 Lorazepam


  (Ativan)  1 mg  Q6H  PRN


 ORAL


 For Anxiety  2/25/20 22:00


 3/3/20 21:59   


 


 


 Losartan Potassium


  (Cozaar)  50 mg  DAILY


 ORAL


   2/26/20 09:00


 3/27/20 08:59  3/2/20 09:02


 


 


 Morphine Sulfate


  (Morphine


 Sulfate)  2 mg  Q4H  PRN


 IVP


 For severe Pain  2/28/20 15:15


 3/6/20 15:14  3/1/20 04:13


 


 


 Ondansetron HCl


  (Zofran)  4 mg  Q4H  PRN


 IVP


 Nausea & Vomiting  2/28/20 15:15


 3/29/20 15:14  3/1/20 04:13


 


 


 Pantoprazole


  (Protonix)  40 mg  EVERY 12  HOURS


 ORAL


   3/2/20 21:00


 4/1/20 20:59   


 


 


 Rivaroxaban


  (Xarelto)  15 mg  BID


 ORAL


   2/26/20 18:00


 3/18/20 09:01  3/2/20 09:02


 


 


 Rivaroxaban


  (Xarelto)  20 mg  DAILY


 ORAL


   3/19/20 09:00


 4/18/20 08:59   


 


 


 Tramadol HCl


  (Ultram)  50 mg  Q6H  PRN


 ORAL


 Severe Pain (Pain Scale 7-10)  2/25/20 22:00


 3/3/20 21:59  2/29/20 08:33


 


 


 Vancomycin HCl


  (Vanco rx to


 dose)  1 ea  DAILY  PRN


 MISC


 Per rx protocol  2/25/20 22:00


 3/26/20 21:59   


 


 


 Vancomycin HCl 1


 gm/Dextrose  275 ml @ 


 183.708


 mls/hr  Q36H


 IVPB


   3/2/20 12:00


 3/6/20 00:00   


 

















Aleisha Lagunas MD Mar 2, 2020 13:11

## 2020-03-03 VITALS — DIASTOLIC BLOOD PRESSURE: 48 MMHG | SYSTOLIC BLOOD PRESSURE: 108 MMHG

## 2020-03-03 VITALS — SYSTOLIC BLOOD PRESSURE: 142 MMHG | DIASTOLIC BLOOD PRESSURE: 61 MMHG

## 2020-03-03 VITALS — DIASTOLIC BLOOD PRESSURE: 76 MMHG | SYSTOLIC BLOOD PRESSURE: 129 MMHG

## 2020-03-03 VITALS — DIASTOLIC BLOOD PRESSURE: 56 MMHG | SYSTOLIC BLOOD PRESSURE: 136 MMHG

## 2020-03-03 VITALS — SYSTOLIC BLOOD PRESSURE: 134 MMHG | DIASTOLIC BLOOD PRESSURE: 56 MMHG

## 2020-03-03 VITALS — SYSTOLIC BLOOD PRESSURE: 118 MMHG | DIASTOLIC BLOOD PRESSURE: 76 MMHG

## 2020-03-03 LAB
ADD MANUAL DIFF: NO
ALBUMIN SERPL-MCNC: 2.5 G/DL (ref 3.4–5)
ALBUMIN/GLOB SERPL: 0.6 {RATIO} (ref 1–2.7)
ALP SERPL-CCNC: 62 U/L (ref 46–116)
ALT SERPL-CCNC: 17 U/L (ref 12–78)
ANION GAP SERPL CALC-SCNC: 12 MMOL/L (ref 5–15)
APPEARANCE UR: (no result)
APTT PPP: YELLOW S
AST SERPL-CCNC: 15 U/L (ref 15–37)
BASOPHILS NFR BLD AUTO: 0.8 % (ref 0–2)
BILIRUB SERPL-MCNC: 0.6 MG/DL (ref 0.2–1)
BUN SERPL-MCNC: 26 MG/DL (ref 7–18)
CALCIUM SERPL-MCNC: 9.6 MG/DL (ref 8.5–10.1)
CHLORIDE SERPL-SCNC: 107 MMOL/L (ref 98–107)
CO2 SERPL-SCNC: 30 MMOL/L (ref 21–32)
CREAT SERPL-MCNC: 1.5 MG/DL (ref 0.55–1.3)
EOSINOPHIL NFR BLD AUTO: 3.6 % (ref 0–3)
ERYTHROCYTE [DISTWIDTH] IN BLOOD BY AUTOMATED COUNT: 12.5 % (ref 11.6–14.8)
GLOBULIN SER-MCNC: 4.1 G/DL
GLUCOSE UR STRIP-MCNC: NEGATIVE MG/DL
HCT VFR BLD CALC: 29.3 % (ref 37–47)
HGB BLD-MCNC: 9.9 G/DL (ref 12–16)
KETONES UR QL STRIP: (no result)
LEUKOCYTE ESTERASE UR QL STRIP: (no result)
LYMPHOCYTES NFR BLD AUTO: 13.3 % (ref 20–45)
MCV RBC AUTO: 86 FL (ref 80–99)
MONOCYTES NFR BLD AUTO: 5.4 % (ref 1–10)
NEUTROPHILS NFR BLD AUTO: 76.9 % (ref 45–75)
NITRITE UR QL STRIP: NEGATIVE
PH UR STRIP: 5 [PH] (ref 4.5–8)
PHOSPHATE SERPL-MCNC: 3.4 MG/DL (ref 2.5–4.9)
PLATELET # BLD: 255 K/UL (ref 150–450)
POTASSIUM SERPL-SCNC: 4.2 MMOL/L (ref 3.5–5.1)
PROT UR QL STRIP: (no result)
RBC # BLD AUTO: 3.41 M/UL (ref 4.2–5.4)
SODIUM SERPL-SCNC: 148 MMOL/L (ref 136–145)
SP GR UR STRIP: 1.01 (ref 1–1.03)
UROBILINOGEN UR-MCNC: NORMAL MG/DL (ref 0–1)
WBC # BLD AUTO: 13 K/UL (ref 4.8–10.8)

## 2020-03-03 RX ADMIN — RIVAROXABAN SCH MG: 15 TABLET, FILM COATED ORAL at 17:42

## 2020-03-03 RX ADMIN — DOCUSATE SODIUM SCH MG: 100 CAPSULE, LIQUID FILLED ORAL at 17:43

## 2020-03-03 RX ADMIN — CARVEDILOL SCH MG: 25 TABLET, FILM COATED ORAL at 08:25

## 2020-03-03 RX ADMIN — ASPIRIN SCH MG: 81 TABLET, DELAYED RELEASE ORAL at 08:25

## 2020-03-03 RX ADMIN — DOCUSATE SODIUM SCH MG: 100 CAPSULE, LIQUID FILLED ORAL at 08:26

## 2020-03-03 RX ADMIN — CARVEDILOL SCH MG: 25 TABLET, FILM COATED ORAL at 17:43

## 2020-03-03 RX ADMIN — RIVAROXABAN SCH MG: 15 TABLET, FILM COATED ORAL at 08:26

## 2020-03-03 RX ADMIN — DOCUSATE SODIUM SCH MG: 100 CAPSULE, LIQUID FILLED ORAL at 12:46

## 2020-03-03 RX ADMIN — LOSARTAN POTASSIUM SCH MG: 50 TABLET, FILM COATED ORAL at 08:26

## 2020-03-03 NOTE — INTERNAL MED PROGRESS NOTE
Subjective


Date of Service:  Mar 3, 2020


Physician Name


Sixto Hardy


Attending Physician


Chris Yarbrough MD





Current Medications








 Medications


  (Trade)  Dose


 Ordered  Sig/Imelda


 Route


 PRN Reason  Start Time


 Stop Time Status Last Admin


Dose Admin


 


 Acetaminophen


  (Tylenol)  1,000 mg  Q6H  PRN


 ORAL


 Mild Pain/Temp > 100.5  2/25/20 22:00


 3/26/20 21:59  2/25/20 22:33


 


 


 Aspirin


  (Ecotrin)  81 mg  DAILY


 ORAL


   2/26/20 09:00


 3/27/20 08:59  3/3/20 08:25


 


 


 Carvedilol


  (Coreg)  25 mg  BID


 ORAL


   2/26/20 09:00


 3/27/20 08:59  3/3/20 08:25


 


 


 Daptomycin 400 mg/


 Sodium Chloride  55 ml @ 


 100 mls/hr  Q48H


 IV


   3/2/20 18:00


 3/9/20 17:59  3/2/20 18:50


 


 


 Docusate Sodium


  (Colace)  100 mg  THREE TIMES A  DAY


 ORAL


   3/2/20 13:00


 4/1/20 12:59  3/3/20 12:46


 


 


 Folic Acid


  (Folate)  1 mg  DAILY


 ORAL


   3/2/20 10:00


 4/1/20 09:59  3/3/20 08:26


 


 


 Lorazepam


  (Ativan 2mg/ml


 1ml)  2 mg  Q4H  PRN


 IV


 For Anxiety  2/28/20 12:15


 3/6/20 12:14   


 


 


 Lorazepam


  (Ativan)  1 mg  Q6H  PRN


 ORAL


 For Anxiety  2/25/20 22:00


 3/3/20 21:59   


 


 


 Losartan Potassium


  (Cozaar)  50 mg  DAILY


 ORAL


   2/26/20 09:00


 3/27/20 08:59  3/3/20 08:26


 


 


 Morphine Sulfate


  (Morphine


 Sulfate)  2 mg  Q4H  PRN


 IVP


 For severe Pain  2/28/20 15:15


 3/6/20 15:14  3/1/20 04:13


 


 


 Ondansetron HCl


  (Zofran)  4 mg  Q4H  PRN


 IVP


 Nausea & Vomiting  2/28/20 15:15


 3/29/20 15:14  3/1/20 04:13


 


 


 Pantoprazole


  (Protonix)  40 mg  EVERY 12  HOURS


 ORAL


   3/2/20 21:00


 4/1/20 20:59  3/3/20 08:26


 


 


 Rivaroxaban


  (Xarelto)  15 mg  BID


 ORAL


   2/26/20 18:00


 3/18/20 09:01  3/3/20 08:26


 


 


 Rivaroxaban


  (Xarelto)  20 mg  DAILY


 ORAL


   3/19/20 09:00


 4/18/20 08:59   


 


 


 Tramadol HCl


  (Ultram)  50 mg  Q6H  PRN


 ORAL


 Severe Pain (Pain Scale 7-10)  2/25/20 22:00


 3/3/20 21:59  2/29/20 08:33


 








Allergies:  


Coded Allergies:  


     No Known Allergies (Unverified , 9/5/19)


ROS Limited/Unobtainable:  No


Constitutional:  Reports: no symptoms


HEENT:  Reports: no symptoms


Cardiovascular:  Reports: no symptoms


Respiratory:  Reports: no symptoms


Gastrointestinal/Abdominal:  Reports: no symptoms


Genitourinary:  Reports: no symptoms


Neurologic/Psychiatric:  Reports: no symptoms


Subjective


84 YO F admitted with bilateral leg swelling and cellulitis bilat lower 

extremities.   Now acute deep venous thrombosis left leg.  Cover for Int Fredy-Dr Yarbrough





Objective





Last Vital Signs








  Date Time  Temp Pulse Resp B/P (MAP) Pulse Ox O2 Delivery O2 Flow Rate FiO2


 


3/3/20 16:00 98.3 72 20 129/76 (93) 95   


 


3/3/20 09:00      Room Air  











Laboratory Tests








Test


  3/3/20


05:00


 


White Blood Count


  13.0 K/UL


(4.8-10.8)  H


 


Red Blood Count


  3.41 M/UL


(4.20-5.40)  L


 


Hemoglobin


  9.9 G/DL


(12.0-16.0)  L


 


Hematocrit


  29.3 %


(37.0-47.0)  L


 


Mean Corpuscular Volume 86 FL (80-99)  


 


Mean Corpuscular Hemoglobin


  28.9 PG


(27.0-31.0)


 


Mean Corpuscular Hemoglobin


Concent 33.7 G/DL


(32.0-36.0)


 


Red Cell Distribution Width


  12.5 %


(11.6-14.8)


 


Platelet Count


  255 K/UL


(150-450)


 


Mean Platelet Volume


  5.7 FL


(6.5-10.1)  L


 


Neutrophils (%) (Auto)


  76.9 %


(45.0-75.0)  H


 


Lymphocytes (%) (Auto)


  13.3 %


(20.0-45.0)  L


 


Monocytes (%) (Auto)


  5.4 %


(1.0-10.0)


 


Eosinophils (%) (Auto)


  3.6 %


(0.0-3.0)  H


 


Basophils (%) (Auto)


  0.8 %


(0.0-2.0)


 


Erythrocyte Sedimentation Rate


  79 MM/HR


(0-30)  H


 


Sodium Level


  148 MMOL/L


(136-145)  H


 


Potassium Level


  4.2 MMOL/L


(3.5-5.1)


 


Chloride Level


  107 MMOL/L


()


 


Carbon Dioxide Level


  30 MMOL/L


(21-32)


 


Anion Gap


  12 mmol/L


(5-15)


 


Blood Urea Nitrogen


  26 mg/dL


(7-18)  H


 


Creatinine


  1.5 MG/DL


(0.55-1.30)  H


 


Estimat Glomerular Filtration


Rate 40.2 mL/min


(>60)


 


Glucose Level


  100 MG/DL


()


 


Calcium Level


  9.6 MG/DL


(8.5-10.1)


 


Phosphorus Level


  3.4 MG/DL


(2.5-4.9)


 


Magnesium Level


  2.0 MG/DL


(1.8-2.4)


 


Total Bilirubin


  0.6 MG/DL


(0.2-1.0)


 


Aspartate Amino Transf


(AST/SGOT) 15 U/L (15-37)


 


 


Alanine Aminotransferase


(ALT/SGPT) 17 U/L (12-78)


 


 


Alkaline Phosphatase


  62 U/L


()


 


C-Reactive Protein,


Quantitative 16.6 mg/dL


(0.00-0.90)  H


 


Total Protein


  6.6 G/DL


(6.4-8.2)


 


Albumin


  2.5 G/DL


(3.4-5.0)  L


 


Globulin 4.1 g/dL  


 


Albumin/Globulin Ratio


  0.6 (1.0-2.7)


L

















Intake and Output  


 


 3/2/20 3/3/20





 19:00 07:00


 


Intake Total 480 ml 120 ml


 


Output Total 400 ml 450 ml


 


Balance 80 ml -330 ml


 


  


 


Intake Oral 480 ml 


 


IV Total  120 ml


 


Output Urine Total 400 ml 450 ml


 


# Voids 2 








Objective


PHYSICAL EXAMINATION:


GENERAL:  The patient is a well-developed, well-nourished 


female, in no apparent distress.


HEENT:  Eyes, pupils equal and responsive to light and accommodation.


Extraocular movements are intact.


NECK:  Supple without lymphadenopathy.


CHEST:  Lungs are clear to auscultation bilaterally without wheezes or


rales.


CARDIOVASCULAR:  Regular rhythm and rate.  S1, S2 normal without murmurs,


rubs, or gallops.


ABDOMEN:  Soft, nontender, and nondistended.  Positive bowel sounds.  No


evidence of hepatosplenomegaly.  Currently, no rebound or guarding.


EXTREMITIES:  A 2+ pitting edema from the ankles to the knees bilaterally


with erythema of the left anterior calf from the ankle to the knee, left


greater than right.





Assessment/Plan


Assessment/Plan


ASSESSMENT:  This is an 85-year-old  female:





1. Cellulitis of bilateral lower extremities.


2. Lymphedema bilateral lower extremities.


3. Bilateral leg pain.


4. Hypertension.


5.  Acute deep venous thrombosis left common femoral to superficial


femoral vein





TREATMENT:


1. Cellulitis of the bilateral lower extremities.


ABX= vancomycin and S/P ceftriaxone.  An


Infectious Disease consultation has been obtained with Dr. Elizabeth.  We


will follow recommendations of Infectious Disease.


2. Hypertension.  Continue Cozaar and Coreg as above.


3.  Start xarelto


4.  Discharge planning : skilled nursing fac











Sixto Hardy MD Mar 3, 2020 17:04

## 2020-03-03 NOTE — NUR
NURSE NOTES:

PT IS AXOX3, CALM, EATING BREAKFAST IN BED. DENIES PAIN OR N/V AT THIS TIME. PT EDUCATED ON 
PLAN FOR TODAY. PT VERBALIZED UNDERSTANDING. LEFT LEG WITH NON-PITTING EDEMA, ELEVATED ON 
PILLOW. PT EDUCATED ON KEEPING LEG ELEVATED. IN NO APPARENT DISTRESS AT THIS TIME. BED IN 
LOWEST POSITION WITH BEDSIDE RAILS X3 RAISED. EDUCATED ON FALL PRECAUTIONS. CALL LIGHT 
WITHIN REACH. WILL CONTINUE TO MONITOR.

## 2020-03-03 NOTE — INFECTIOUS DISEASES PROG NOTE
Assessment/Plan


Assessment/Plan


ASSESSMENT:  The patient is an 85-year-old female with 





Lower extremity lymphedema/cellulitis


Left lower extremity edema.


Normal white blood cells and afebrile











MIKE 


DVT: Acute DVT in the left common femoral vein and superficial femoral vein


Hypertension.


Bilateral lymphedema








PLAN:








Continue the patient on IV Datpo # 2/5-7 





   3/2 Sp  IV Vancomycin # 5 ( MIKE )


    2/27 Sp  Rocephin # 1 


Monitor CBC


Monitor BMP.


Blood cultures.





Subjective


Allergies:  


Coded Allergies:  


     No Known Allergies (Unverified , 9/5/19)


Subjective


comfortable no acute event





Objective


Vital Signs





Last 24 Hour Vital Signs








  Date Time  Temp Pulse Resp B/P (MAP) Pulse Ox O2 Delivery O2 Flow Rate FiO2


 


3/3/20 17:43  72  129/76    


 


3/3/20 16:00 98.3 72 20 129/76 (93) 95   


 


3/3/20 12:00 98.3 78 18 142/61 (88) 95   


 


3/3/20 09:00      Room Air  


 


3/3/20 08:26    136/56    


 


3/3/20 08:25  79  136/56    


 


3/3/20 07:59 98.1 79 18 136/56 (82) 94   


 


3/3/20 04:26 97.9 77 21 134/56 (82) 94   


 


3/3/20 00:23 97.6 81 20 118/76 (90) 99   


 


3/2/20 21:23      Room Air  


 


3/2/20 20:00 98.1 72 21 129/59 (82) 95   








Height (Feet):  5


Height (Inches):  2.00


Weight (Pounds):  227


HEENT:  anicteric


Respiratory/Chest:  normal breath sounds


Cardiovascular:  regular rhythm


Abdomen:  no organomegaly





Laboratory Tests








Test


  3/3/20


05:00 3/3/20


17:45


 


White Blood Count


  13.0 K/UL


(4.8-10.8)  H 


 


 


Red Blood Count


  3.41 M/UL


(4.20-5.40)  L 


 


 


Hemoglobin


  9.9 G/DL


(12.0-16.0)  L 


 


 


Hematocrit


  29.3 %


(37.0-47.0)  L 


 


 


Mean Corpuscular Volume 86 FL (80-99)   


 


Mean Corpuscular Hemoglobin


  28.9 PG


(27.0-31.0) 


 


 


Mean Corpuscular Hemoglobin


Concent 33.7 G/DL


(32.0-36.0) 


 


 


Red Cell Distribution Width


  12.5 %


(11.6-14.8) 


 


 


Platelet Count


  255 K/UL


(150-450) 


 


 


Mean Platelet Volume


  5.7 FL


(6.5-10.1)  L 


 


 


Neutrophils (%) (Auto)


  76.9 %


(45.0-75.0)  H 


 


 


Lymphocytes (%) (Auto)


  13.3 %


(20.0-45.0)  L 


 


 


Monocytes (%) (Auto)


  5.4 %


(1.0-10.0) 


 


 


Eosinophils (%) (Auto)


  3.6 %


(0.0-3.0)  H 


 


 


Basophils (%) (Auto)


  0.8 %


(0.0-2.0) 


 


 


Erythrocyte Sedimentation Rate


  79 MM/HR


(0-30)  H 


 


 


Sodium Level


  148 MMOL/L


(136-145)  H 


 


 


Potassium Level


  4.2 MMOL/L


(3.5-5.1) 


 


 


Chloride Level


  107 MMOL/L


() 


 


 


Carbon Dioxide Level


  30 MMOL/L


(21-32) 


 


 


Anion Gap


  12 mmol/L


(5-15) 


 


 


Blood Urea Nitrogen


  26 mg/dL


(7-18)  H 


 


 


Creatinine


  1.5 MG/DL


(0.55-1.30)  H 


 


 


Estimat Glomerular Filtration


Rate 40.2 mL/min


(>60) 


 


 


Glucose Level


  100 MG/DL


() 


 


 


Calcium Level


  9.6 MG/DL


(8.5-10.1) 


 


 


Phosphorus Level


  3.4 MG/DL


(2.5-4.9) 


 


 


Magnesium Level


  2.0 MG/DL


(1.8-2.4) 


 


 


Total Bilirubin


  0.6 MG/DL


(0.2-1.0) 


 


 


Aspartate Amino Transf


(AST/SGOT) 15 U/L (15-37)


  


 


 


Alanine Aminotransferase


(ALT/SGPT) 17 U/L (12-78)


  


 


 


Alkaline Phosphatase


  62 U/L


() 


 


 


C-Reactive Protein,


Quantitative 16.6 mg/dL


(0.00-0.90)  H 


 


 


Total Protein


  6.6 G/DL


(6.4-8.2) 


 


 


Albumin


  2.5 G/DL


(3.4-5.0)  L 


 


 


Globulin 4.1 g/dL   


 


Albumin/Globulin Ratio


  0.6 (1.0-2.7)


L 


 


 


Urine Color  Yellow  


 


Urine Appearance  Cloudy  


 


Urine pH  5 (4.5-8.0)  


 


Urine Specific Gravity


  


  1.015


(1.005-1.035)


 


Urine Protein


  


  2+ (NEGATIVE)


H


 


Urine Glucose (UA)


  


  Negative


(NEGATIVE)


 


Urine Ketones


  


  1+ (NEGATIVE)


H


 


Urine Blood


  


  5+ (NEGATIVE)


H


 


Urine Nitrite


  


  Negative


(NEGATIVE)


 


Urine Bilirubin


  


  Negative


(NEGATIVE)


 


Urine Urobilinogen


  


  Normal MG/DL


(0.0-1.0)


 


Urine Leukocyte Esterase


  


  3+ (NEGATIVE)


H


 


Urine RBC


  


  30-40 /HPF (0


- 2)  H


 


Urine WBC


  


  2-4 /HPF (0 -


2)


 


Urine Squamous Epithelial


Cells 


  Many /LPF


(NONE/OCC)  H


 


Urine Bacteria


  


  Moderate /HPF


(NONE)  H











Current Medications








 Medications


  (Trade)  Dose


 Ordered  Sig/Imelda


 Route


 PRN Reason  Start Time


 Stop Time Status Last Admin


Dose Admin


 


 Acetaminophen


  (Tylenol)  1,000 mg  Q6H  PRN


 ORAL


 Mild Pain/Temp > 100.5  2/25/20 22:00


 3/26/20 21:59  2/25/20 22:33


 


 


 Aspirin


  (Ecotrin)  81 mg  DAILY


 ORAL


   2/26/20 09:00


 3/27/20 08:59  3/3/20 08:25


 


 


 Carvedilol


  (Coreg)  25 mg  BID


 ORAL


   2/26/20 09:00


 3/27/20 08:59  3/3/20 08:25


 


 


 Daptomycin 400 mg/


 Sodium Chloride  55 ml @ 


 100 mls/hr  Q48H


 IV


   3/2/20 18:00


 3/9/20 17:59  3/2/20 18:50


 


 


 Docusate Sodium


  (Colace)  100 mg  THREE TIMES A  DAY


 ORAL


   3/2/20 13:00


 4/1/20 12:59  3/3/20 12:46


 


 


 Folic Acid


  (Folate)  1 mg  DAILY


 ORAL


   3/2/20 10:00


 4/1/20 09:59  3/3/20 08:26


 


 


 Lorazepam


  (Ativan 2mg/ml


 1ml)  2 mg  Q4H  PRN


 IV


 For Anxiety  2/28/20 12:15


 3/6/20 12:14   


 


 


 Lorazepam


  (Ativan)  1 mg  Q6H  PRN


 ORAL


 For Anxiety  2/25/20 22:00


 3/3/20 21:59   


 


 


 Losartan Potassium


  (Cozaar)  50 mg  DAILY


 ORAL


   2/26/20 09:00


 3/27/20 08:59  3/3/20 08:26


 


 


 Morphine Sulfate


  (Morphine


 Sulfate)  2 mg  Q4H  PRN


 IVP


 For severe Pain  2/28/20 15:15


 3/6/20 15:14  3/1/20 04:13


 


 


 Ondansetron HCl


  (Zofran)  4 mg  Q4H  PRN


 IVP


 Nausea & Vomiting  2/28/20 15:15


 3/29/20 15:14  3/1/20 04:13


 


 


 Pantoprazole


  (Protonix)  40 mg  EVERY 12  HOURS


 ORAL


   3/2/20 21:00


 4/1/20 20:59  3/3/20 08:26


 


 


 Rivaroxaban


  (Xarelto)  15 mg  BID


 ORAL


   2/26/20 18:00


 3/18/20 09:01  3/3/20 17:42


 


 


 Rivaroxaban


  (Xarelto)  20 mg  DAILY


 ORAL


   3/19/20 09:00


 4/18/20 08:59   


 


 


 Tramadol HCl


  (Ultram)  50 mg  Q6H  PRN


 ORAL


 Severe Pain (Pain Scale 7-10)  2/25/20 22:00


 3/3/20 21:59  2/29/20 08:33


 

















Jorge Elizabeth MD Mar 3, 2020 18:42

## 2020-03-03 NOTE — NUR
NURSE NOTES:



Received patient awake, alert, verbal, resting in bed comfortably without complaints.

## 2020-03-03 NOTE — NEPHROLOGY PROGRESS NOTE
Assessment/Plan


Problem List:  


(1) Acute on chronic diastolic congestive heart failure


Assessment:  Cr lower today





(2) Morbid obesity


(3) Peripheral edema


(4) Elephantiasis


Assessment





Acute renal failure as serum creatinine ashu from 1 from the date of admission 

TO 1.9 today.


Etiology is most likely overdiuresis and and partly treatment with vancomycin.


Anemia.


Other conditions:


1. Cellulitis of bilateral lower extremities.


2. Lymphedema bilateral lower extremities.


3. Bilateral leg pain.


4. Hypertension.


5.  Acute deep venous thrombosis left common femoral to superficial femoral vein


Plan








Hold Lasix.


Kidney ultrasound.  Negative ultrasound the kidneys. 5 mm left renal cyst.


Urine analysis since none was done since admission. still pending


Monitor renal parameters.


Chest x-ray.


Monitor Vanco levels.


trial Folic-B12- Venofer


Per orders.





Subjective


ROS Limited/Unobtainable:  No


Constitutional:  Reports: malaise





Objective


Objective





Last 24 Hour Vital Signs








  Date Time  Temp Pulse Resp B/P (MAP) Pulse Ox O2 Delivery O2 Flow Rate FiO2


 


3/3/20 09:00      Room Air  


 


3/3/20 08:26    136/56    


 


3/3/20 08:25  79  136/56    


 


3/3/20 07:59 98.1 79 18 136/56 (82) 94   


 


3/3/20 04:26 97.9 77 21 134/56 (82) 94   


 


3/3/20 00:23 97.6 81 20 118/76 (90) 99   


 


3/2/20 21:23      Room Air  


 


3/2/20 20:00 98.1 72 21 129/59 (82) 95   


 


3/2/20 17:38  81  148/59    


 


3/2/20 16:00 98.6 81 18 148/59 (88) 94   


 


3/2/20 12:00 98.3 80 18 123/53 (76) 96   

















Intake and Output  


 


 3/2/20 3/3/20





 19:00 07:00


 


Intake Total 480 ml 120 ml


 


Output Total 400 ml 450 ml


 


Balance 80 ml -330 ml


 


  


 


Intake Oral 480 ml 


 


IV Total  120 ml


 


Output Urine Total 400 ml 450 ml


 


# Voids 2 








Laboratory Tests


3/3/20 05:00: 


White Blood Count 13.0H, Red Blood Count 3.41L, Hemoglobin 9.9L, Hematocrit 

29.3L, Mean Corpuscular Volume 86, Mean Corpuscular Hemoglobin 28.9, Mean 

Corpuscular Hemoglobin Concent 33.7, Red Cell Distribution Width 12.5, Platelet 

Count 255, Mean Platelet Volume 5.7L, Neutrophils (%) (Auto) 76.9H, Lymphocytes 

(%) (Auto) 13.3L, Monocytes (%) (Auto) 5.4, Eosinophils (%) (Auto) 3.6H, 

Basophils (%) (Auto) 0.8, Erythrocyte Sedimentation Rate 79H, Sodium Level 148H

, Potassium Level 4.2, Chloride Level 107, Carbon Dioxide Level 30, Anion Gap 12

, Blood Urea Nitrogen 26H, Creatinine 1.5H, Estimat Glomerular Filtration Rate 

40.2, Glucose Level 100, Calcium Level 9.6, Phosphorus Level 3.4, Magnesium 

Level 2.0, Total Bilirubin 0.6, Aspartate Amino Transf (AST/SGOT) 15, Alanine 

Aminotransferase (ALT/SGPT) 17, Alkaline Phosphatase 62, C-Reactive Protein, 

Quantitative 16.6H, Total Protein 6.6, Albumin 2.5L, Globulin 4.1, Albumin/

Globulin Ratio 0.6L


Height (Feet):  5


Height (Inches):  2.00


Weight (Pounds):  227


General Appearance:  no apparent distress


Cardiovascular:  normal rate


Respiratory/Chest:  decreased breath sounds


Abdomen:  soft


Extremities:  other - edema











Neville Marshall MD Mar 3, 2020 11:19

## 2020-03-03 NOTE — PULMONOLOGY PROGRESS NOTE
Assessment/Plan


Problems:  


(1) ATN (acute tubular necrosis)


(2) Cellulitis


(3) Severe anemia


(4) Peripheral edema


(5) Elephantiasis


(6) Mild pulmonary hypertension


(7) Morbid obesity


(8) History of hypertension


Assessment/Plan


wbc higher


check cultures


doing better


all reviewed


bun/creatinine slightlyl beter today


wound care,


iv abx


podiatry


monitor BP


f/u stool for OB was negative.


symptomatic treatment.





Subjective


ROS Limited/Unobtainable:  No


Constitutional:  Reports: no symptoms


HEENT:  Repors: no symptoms


Respiratory:  Reports: no symptoms


Allergies:  


Coded Allergies:  


     No Known Allergies (Unverified , 9/5/19)





Objective





Last 24 Hour Vital Signs








  Date Time  Temp Pulse Resp B/P (MAP) Pulse Ox O2 Delivery O2 Flow Rate FiO2


 


3/3/20 12:00 98.3 78 18 142/61 (88) 95   


 


3/3/20 09:00      Room Air  


 


3/3/20 08:26    136/56    


 


3/3/20 08:25  79  136/56    


 


3/3/20 07:59 98.1 79 18 136/56 (82) 94   


 


3/3/20 04:26 97.9 77 21 134/56 (82) 94   


 


3/3/20 00:23 97.6 81 20 118/76 (90) 99   


 


3/2/20 21:23      Room Air  


 


3/2/20 20:00 98.1 72 21 129/59 (82) 95   


 


3/2/20 17:38  81  148/59    


 


3/2/20 16:00 98.6 81 18 148/59 (88) 94   

















Intake and Output  


 


 3/2/20 3/3/20





 19:00 07:00


 


Intake Total 480 ml 120 ml


 


Output Total 400 ml 450 ml


 


Balance 80 ml -330 ml


 


  


 


Intake Oral 480 ml 


 


IV Total  120 ml


 


Output Urine Total 400 ml 450 ml


 


# Voids 2 








General Appearance:  WD/WN, no acute distress


HEENT:  atraumatic


Respiratory/Chest:  chest wall non-tender, lungs clear


Breasts:  no masses


Cardiovascular:  normal rate, regular rhythm


Abdomen:  normal bowel sounds, soft, non tender


Genitourinary:  normal external genitalia


Extremities:  no clubbing


Skin:  no rash


Laboratory Tests


3/3/20 05:00: 


White Blood Count 13.0H, Red Blood Count 3.41L, Hemoglobin 9.9L, Hematocrit 

29.3L, Mean Corpuscular Volume 86, Mean Corpuscular Hemoglobin 28.9, Mean 

Corpuscular Hemoglobin Concent 33.7, Red Cell Distribution Width 12.5, Platelet 

Count 255, Mean Platelet Volume 5.7L, Neutrophils (%) (Auto) 76.9H, Lymphocytes 

(%) (Auto) 13.3L, Monocytes (%) (Auto) 5.4, Eosinophils (%) (Auto) 3.6H, 

Basophils (%) (Auto) 0.8, Erythrocyte Sedimentation Rate 79H, Sodium Level 148H

, Potassium Level 4.2, Chloride Level 107, Carbon Dioxide Level 30, Anion Gap 12

, Blood Urea Nitrogen 26H, Creatinine 1.5H, Estimat Glomerular Filtration Rate 

40.2, Glucose Level 100, Calcium Level 9.6, Phosphorus Level 3.4, Magnesium 

Level 2.0, Total Bilirubin 0.6, Aspartate Amino Transf (AST/SGOT) 15, Alanine 

Aminotransferase (ALT/SGPT) 17, Alkaline Phosphatase 62, C-Reactive Protein, 

Quantitative 16.6H, Total Protein 6.6, Albumin 2.5L, Globulin 4.1, Albumin/

Globulin Ratio 0.6L





Current Medications








 Medications


  (Trade)  Dose


 Ordered  Sig/Imelda


 Route


 PRN Reason  Start Time


 Stop Time Status Last Admin


Dose Admin


 


 Acetaminophen


  (Tylenol)  1,000 mg  Q6H  PRN


 ORAL


 Mild Pain/Temp > 100.5  2/25/20 22:00


 3/26/20 21:59  2/25/20 22:33


 


 


 Aspirin


  (Ecotrin)  81 mg  DAILY


 ORAL


   2/26/20 09:00


 3/27/20 08:59  3/3/20 08:25


 


 


 Carvedilol


  (Coreg)  25 mg  BID


 ORAL


   2/26/20 09:00


 3/27/20 08:59  3/3/20 08:25


 


 


 Cyanocobalamin


  (Vitamin B12)  1,000 mcg  ONCE


 SUBQ


   3/3/20 11:30


 3/3/20 13:00   


 


 


 Daptomycin 400 mg/


 Sodium Chloride  55 ml @ 


 100 mls/hr  Q48H


 IV


   3/2/20 18:00


 3/9/20 17:59  3/2/20 18:50


 


 


 Docusate Sodium


  (Colace)  100 mg  THREE TIMES A  DAY


 ORAL


   3/2/20 13:00


 4/1/20 12:59  3/3/20 08:26


 


 


 Folic Acid


  (Folate)  1 mg  DAILY


 ORAL


   3/2/20 10:00


 4/1/20 09:59  3/3/20 08:26


 


 


 Lorazepam


  (Ativan 2mg/ml


 1ml)  2 mg  Q4H  PRN


 IV


 For Anxiety  2/28/20 12:15


 3/6/20 12:14   


 


 


 Lorazepam


  (Ativan)  1 mg  Q6H  PRN


 ORAL


 For Anxiety  2/25/20 22:00


 3/3/20 21:59   


 


 


 Losartan Potassium


  (Cozaar)  50 mg  DAILY


 ORAL


   2/26/20 09:00


 3/27/20 08:59  3/3/20 08:26


 


 


 Morphine Sulfate


  (Morphine


 Sulfate)  2 mg  Q4H  PRN


 IVP


 For severe Pain  2/28/20 15:15


 3/6/20 15:14  3/1/20 04:13


 


 


 Ondansetron HCl


  (Zofran)  4 mg  Q4H  PRN


 IVP


 Nausea & Vomiting  2/28/20 15:15


 3/29/20 15:14  3/1/20 04:13


 


 


 Pantoprazole


  (Protonix)  40 mg  EVERY 12  HOURS


 ORAL


   3/2/20 21:00


 4/1/20 20:59  3/3/20 08:26


 


 


 Rivaroxaban


  (Xarelto)  15 mg  BID


 ORAL


   2/26/20 18:00


 3/18/20 09:01  3/3/20 08:26


 


 


 Rivaroxaban


  (Xarelto)  20 mg  DAILY


 ORAL


   3/19/20 09:00


 4/18/20 08:59   


 


 


 Tramadol HCl


  (Ultram)  50 mg  Q6H  PRN


 ORAL


 Severe Pain (Pain Scale 7-10)  2/25/20 22:00


 3/3/20 21:59  2/29/20 08:33


 

















Aleisha Lagunas MD Mar 3, 2020 12:37

## 2020-03-04 VITALS — SYSTOLIC BLOOD PRESSURE: 128 MMHG | DIASTOLIC BLOOD PRESSURE: 52 MMHG

## 2020-03-04 VITALS — SYSTOLIC BLOOD PRESSURE: 129 MMHG | DIASTOLIC BLOOD PRESSURE: 51 MMHG

## 2020-03-04 VITALS — SYSTOLIC BLOOD PRESSURE: 131 MMHG | DIASTOLIC BLOOD PRESSURE: 58 MMHG

## 2020-03-04 VITALS — SYSTOLIC BLOOD PRESSURE: 134 MMHG | DIASTOLIC BLOOD PRESSURE: 54 MMHG

## 2020-03-04 VITALS — DIASTOLIC BLOOD PRESSURE: 60 MMHG | SYSTOLIC BLOOD PRESSURE: 136 MMHG

## 2020-03-04 VITALS — SYSTOLIC BLOOD PRESSURE: 118 MMHG | DIASTOLIC BLOOD PRESSURE: 49 MMHG

## 2020-03-04 LAB
ADD MANUAL DIFF: NO
ALBUMIN SERPL-MCNC: 2.6 G/DL (ref 3.4–5)
ALBUMIN/GLOB SERPL: 0.7 {RATIO} (ref 1–2.7)
ALP SERPL-CCNC: 56 U/L (ref 46–116)
ALT SERPL-CCNC: 11 U/L (ref 12–78)
ANION GAP SERPL CALC-SCNC: 9 MMOL/L (ref 5–15)
AST SERPL-CCNC: 13 U/L (ref 15–37)
BASOPHILS NFR BLD AUTO: 0.9 % (ref 0–2)
BILIRUB SERPL-MCNC: 0.5 MG/DL (ref 0.2–1)
BUN SERPL-MCNC: 23 MG/DL (ref 7–18)
CALCIUM SERPL-MCNC: 9.4 MG/DL (ref 8.5–10.1)
CHLORIDE SERPL-SCNC: 107 MMOL/L (ref 98–107)
CO2 SERPL-SCNC: 30 MMOL/L (ref 21–32)
CREAT SERPL-MCNC: 1.4 MG/DL (ref 0.55–1.3)
EOSINOPHIL NFR BLD AUTO: 5 % (ref 0–3)
ERYTHROCYTE [DISTWIDTH] IN BLOOD BY AUTOMATED COUNT: 12.6 % (ref 11.6–14.8)
GLOBULIN SER-MCNC: 3.9 G/DL
HCT VFR BLD CALC: 28.8 % (ref 37–47)
HGB BLD-MCNC: 9.6 G/DL (ref 12–16)
LYMPHOCYTES NFR BLD AUTO: 18 % (ref 20–45)
MCV RBC AUTO: 86 FL (ref 80–99)
MONOCYTES NFR BLD AUTO: 4.7 % (ref 1–10)
NEUTROPHILS NFR BLD AUTO: 71.4 % (ref 45–75)
PHOSPHATE SERPL-MCNC: 3.3 MG/DL (ref 2.5–4.9)
PLATELET # BLD: 288 K/UL (ref 150–450)
POTASSIUM SERPL-SCNC: 3.6 MMOL/L (ref 3.5–5.1)
RBC # BLD AUTO: 3.37 M/UL (ref 4.2–5.4)
SODIUM SERPL-SCNC: 146 MMOL/L (ref 136–145)
WBC # BLD AUTO: 11.8 K/UL (ref 4.8–10.8)

## 2020-03-04 RX ADMIN — DOCUSATE SODIUM SCH MG: 100 CAPSULE, LIQUID FILLED ORAL at 12:39

## 2020-03-04 RX ADMIN — DAPTOMYCIN SCH MLS/HR: 500 INJECTION, POWDER, LYOPHILIZED, FOR SOLUTION INTRAVENOUS at 17:39

## 2020-03-04 RX ADMIN — RIVAROXABAN SCH MG: 15 TABLET, FILM COATED ORAL at 17:16

## 2020-03-04 RX ADMIN — CARVEDILOL SCH MG: 25 TABLET, FILM COATED ORAL at 08:45

## 2020-03-04 RX ADMIN — DOCUSATE SODIUM SCH MG: 100 CAPSULE, LIQUID FILLED ORAL at 08:53

## 2020-03-04 RX ADMIN — ASPIRIN SCH MG: 81 TABLET, DELAYED RELEASE ORAL at 08:45

## 2020-03-04 RX ADMIN — LOSARTAN POTASSIUM SCH MG: 50 TABLET, FILM COATED ORAL at 08:45

## 2020-03-04 RX ADMIN — DOCUSATE SODIUM SCH MG: 100 CAPSULE, LIQUID FILLED ORAL at 17:16

## 2020-03-04 RX ADMIN — CARVEDILOL SCH MG: 25 TABLET, FILM COATED ORAL at 17:16

## 2020-03-04 RX ADMIN — RIVAROXABAN SCH MG: 15 TABLET, FILM COATED ORAL at 08:46

## 2020-03-04 NOTE — INTERNAL MED PROGRESS NOTE
Subjective


Date of Service:  Mar 4, 2020


Physician Name


Sixto Hardy


Attending Physician


Chris Yarbrough MD





Current Medications








 Medications


  (Trade)  Dose


 Ordered  Sig/Imelda


 Route


 PRN Reason  Start Time


 Stop Time Status Last Admin


Dose Admin


 


 Acetaminophen


  (Tylenol)  1,000 mg  Q6H  PRN


 ORAL


 Mild Pain/Temp > 100.5  2/25/20 22:00


 3/26/20 21:59  2/25/20 22:33


 


 


 Aspirin


  (Ecotrin)  81 mg  DAILY


 ORAL


   2/26/20 09:00


 3/27/20 08:59  3/4/20 08:45


 


 


 Carvedilol


  (Coreg)  25 mg  BID


 ORAL


   2/26/20 09:00


 3/27/20 08:59  3/4/20 08:45


 


 


 Daptomycin 400 mg/


 Sodium Chloride  55 ml @ 


 100 mls/hr  Q48H


 IV


   3/2/20 18:00


 3/9/20 17:59  3/2/20 18:50


 


 


 Docusate Sodium


  (Colace)  100 mg  THREE TIMES A  DAY


 ORAL


   3/2/20 13:00


 4/1/20 12:59  3/3/20 12:46


 


 


 Folic Acid


  (Folate)  1 mg  DAILY


 ORAL


   3/2/20 10:00


 4/1/20 09:59  3/4/20 08:45


 


 


 Lorazepam


  (Ativan 2mg/ml


 1ml)  2 mg  Q4H  PRN


 IV


 For Anxiety  2/28/20 12:15


 3/6/20 12:14   


 


 


 Losartan Potassium


  (Cozaar)  50 mg  DAILY


 ORAL


   2/26/20 09:00


 3/27/20 08:59  3/4/20 08:45


 


 


 Morphine Sulfate


  (Morphine


 Sulfate)  2 mg  Q4H  PRN


 IVP


 For severe Pain  2/28/20 15:15


 3/6/20 15:14  3/1/20 04:13


 


 


 Ondansetron HCl


  (Zofran)  4 mg  Q4H  PRN


 IVP


 Nausea & Vomiting  2/28/20 15:15


 3/29/20 15:14  3/1/20 04:13


 


 


 Pantoprazole


  (Protonix)  40 mg  EVERY 12  HOURS


 ORAL


   3/2/20 21:00


 4/1/20 20:59  3/4/20 08:46


 


 


 Rivaroxaban


  (Xarelto)  15 mg  BID


 ORAL


   2/26/20 18:00


 3/18/20 09:01  3/4/20 08:46


 


 


 Rivaroxaban


  (Xarelto)  20 mg  DAILY


 ORAL


   3/19/20 09:00


 4/18/20 08:59   


 








Allergies:  


Coded Allergies:  


     No Known Allergies (Unverified , 9/5/19)


ROS Limited/Unobtainable:  No


Constitutional:  Reports: no symptoms


HEENT:  Reports: no symptoms


Cardiovascular:  Reports: no symptoms


Respiratory:  Reports: no symptoms


Gastrointestinal/Abdominal:  Reports: no symptoms


Genitourinary:  Reports: no symptoms


Neurologic/Psychiatric:  Reports: no symptoms


Subjective


86 YO F admitted with bilateral leg swelling and cellulitis bilat lower 

extremities.   Now acute deep venous thrombosis left leg.  Cover for Int Fredy-Dr Yarbrough





Objective





Last Vital Signs








  Date Time  Temp Pulse Resp B/P (MAP) Pulse Ox O2 Delivery O2 Flow Rate FiO2


 


3/4/20 12:00 98.1 75 18 128/52 (77) 95   


 


3/4/20 09:00      Room Air  











Laboratory Tests








Test


  3/3/20


17:45 3/4/20


05:34


 


Urine Color Yellow   


 


Urine Appearance Cloudy   


 


Urine pH 5 (4.5-8.0)   


 


Urine Specific Gravity


  1.015


(1.005-1.035) 


 


 


Urine Protein


  2+ (NEGATIVE)


H 


 


 


Urine Glucose (UA)


  Negative


(NEGATIVE) 


 


 


Urine Ketones


  1+ (NEGATIVE)


H 


 


 


Urine Blood


  5+ (NEGATIVE)


H 


 


 


Urine Nitrite


  Negative


(NEGATIVE) 


 


 


Urine Bilirubin


  Negative


(NEGATIVE) 


 


 


Urine Urobilinogen


  Normal MG/DL


(0.0-1.0) 


 


 


Urine Leukocyte Esterase


  3+ (NEGATIVE)


H 


 


 


Urine RBC


  30-40 /HPF (0


- 2)  H 


 


 


Urine WBC


  2-4 /HPF (0 -


2) 


 


 


Urine Squamous Epithelial


Cells Many /LPF


(NONE/OCC)  H 


 


 


Urine Bacteria


  Moderate /HPF


(NONE)  H 


 


 


White Blood Count


  


  11.8 K/UL


(4.8-10.8)  H


 


Red Blood Count


  


  3.37 M/UL


(4.20-5.40)  L


 


Hemoglobin


  


  9.6 G/DL


(12.0-16.0)  L


 


Hematocrit


  


  28.8 %


(37.0-47.0)  L


 


Mean Corpuscular Volume  86 FL (80-99)  


 


Mean Corpuscular Hemoglobin


  


  28.5 PG


(27.0-31.0)


 


Mean Corpuscular Hemoglobin


Concent 


  33.3 G/DL


(32.0-36.0)


 


Red Cell Distribution Width


  


  12.6 %


(11.6-14.8)


 


Platelet Count


  


  288 K/UL


(150-450)


 


Mean Platelet Volume


  


  5.7 FL


(6.5-10.1)  L


 


Neutrophils (%) (Auto)


  


  71.4 %


(45.0-75.0)


 


Lymphocytes (%) (Auto)


  


  18.0 %


(20.0-45.0)  L


 


Monocytes (%) (Auto)


  


  4.7 %


(1.0-10.0)


 


Eosinophils (%) (Auto)


  


  5.0 %


(0.0-3.0)  H


 


Basophils (%) (Auto)


  


  0.9 %


(0.0-2.0)


 


Sodium Level


  


  146 MMOL/L


(136-145)  H


 


Potassium Level


  


  3.6 MMOL/L


(3.5-5.1)


 


Chloride Level


  


  107 MMOL/L


()


 


Carbon Dioxide Level


  


  30 MMOL/L


(21-32)


 


Anion Gap


  


  9 mmol/L


(5-15)


 


Blood Urea Nitrogen


  


  23 mg/dL


(7-18)  H


 


Creatinine


  


  1.4 MG/DL


(0.55-1.30)  H


 


Estimat Glomerular Filtration


Rate 


  43.5 mL/min


(>60)


 


Glucose Level


  


  100 MG/DL


()


 


Uric Acid


  


  10.5 MG/DL


(2.6-7.2)  H


 


Calcium Level


  


  9.4 MG/DL


(8.5-10.1)


 


Phosphorus Level


  


  3.3 MG/DL


(2.5-4.9)


 


Magnesium Level


  


  2.1 MG/DL


(1.8-2.4)


 


Total Bilirubin


  


  0.5 MG/DL


(0.2-1.0)


 


Aspartate Amino Transf


(AST/SGOT) 


  13 U/L (15-37)


L


 


Alanine Aminotransferase


(ALT/SGPT) 


  11 U/L (12-78)


L


 


Alkaline Phosphatase


  


  56 U/L


()


 


C-Reactive Protein,


Quantitative 


  11.3 mg/dL


(0.00-0.90)  H


 


Pro-B-Type Natriuretic Peptide


  


  385 pg/mL


(0-125)  H


 


Total Protein


  


  6.5 G/DL


(6.4-8.2)


 


Albumin


  


  2.6 G/DL


(3.4-5.0)  L


 


Globulin  3.9 g/dL  


 


Albumin/Globulin Ratio


  


  0.7 (1.0-2.7)


L











Microbiology








 Date/Time


Source Procedure


Growth Status


 


 


 3/3/20 17:45


Urine,Clean Catch Urine Culture - Preliminary


NO GROWTH Resulted

















Intake and Output  


 


 3/3/20 3/4/20





 19:00 07:00


 


Intake Total 360 ml 


 


Balance 360 ml 


 


  


 


Intake Oral 360 ml 


 


# Bowel Movements 1 








Objective


PHYSICAL EXAMINATION:


GENERAL:  The patient is a well-developed, well-nourished 


female, in no apparent distress.


HEENT:  Eyes, pupils equal and responsive to light and accommodation.


Extraocular movements are intact.


NECK:  Supple without lymphadenopathy.


CHEST:  Lungs are clear to auscultation bilaterally without wheezes or


rales.


CARDIOVASCULAR:  Regular rhythm and rate.  S1, S2 normal without murmurs,


rubs, or gallops.


ABDOMEN:  Soft, nontender, and nondistended.  Positive bowel sounds.  No


evidence of hepatosplenomegaly.  Currently, no rebound or guarding.


EXTREMITIES:  A 2+ pitting edema from the ankles to the knees bilaterally


with erythema of the left anterior calf from the ankle to the knee, left


greater than right.





Assessment/Plan


Assessment/Plan


ASSESSMENT:  This is an 85-year-old  female:





1. Cellulitis of bilateral lower extremities.


2. Lymphedema bilateral lower extremities.


3. Bilateral leg pain.


4. Hypertension.


5.  Acute deep venous thrombosis left common femoral to superficial


femoral vein





TREATMENT:


1. Cellulitis of the bilateral lower extremities.


ABX= daptomycin and S/P ceftriaxone.  An


Infectious Disease consultation has been obtained with Dr. Elizabeth.  We


will follow recommendations of Infectious Disease.


2. Hypertension.  Continue Cozaar and Coreg as above.


3.  Start xarelto


4.  Discharge planning : skilled nursing fac











Sixto Hardy MD Mar 4, 2020 12:58

## 2020-03-04 NOTE — INFECTIOUS DISEASES PROG NOTE
Assessment/Plan


Assessment/Plan


ASSESSMENT:  The patient is an 85-year-old female with 





Lower extremity lymphedema/cellulitis


Left lower extremity edema.


Leukocytosis improving 


afebrile











MIKE 


DVT: Acute DVT in the left common femoral vein and superficial femoral vein


Hypertension.


Bilateral lymphedema








PLAN:








Continue the patient on IV Datpo # 3/5-7 





   3/2 Sp  IV Vancomycin # 5 ( MIKE )


    2/27 Sp  Rocephin # 1 


Monitor CBC


Monitor BMP.


Blood cultures





Subjective


Allergies:  


Coded Allergies:  


     No Known Allergies (Unverified , 9/5/19)


Subjective





feeling better





Objective


Vital Signs





Last 24 Hour Vital Signs








  Date Time  Temp Pulse Resp B/P (MAP) Pulse Ox O2 Delivery O2 Flow Rate FiO2


 


3/4/20 08:45    136/60    


 


3/4/20 08:45  84  136/60    


 


3/4/20 08:00 98.6 84 18 136/60 (85) 98   


 


3/4/20 04:23 98.3 82 18 134/54 (80) 92   


 


3/4/20 00:19 98.2 83 18 129/51 (77) 95   


 


3/3/20 21:00      Room Air  


 


3/3/20 20:00 98.2 80 16 108/48 (68) 95   


 


3/3/20 17:43  72  129/76    


 


3/3/20 16:00 98.3 72 20 129/76 (93) 95   


 


3/3/20 12:00 98.3 78 18 142/61 (88) 95   








Height (Feet):  5


Height (Inches):  2.00


Weight (Pounds):  210


Respiratory/Chest:  normal breath sounds


Cardiovascular:  regularly irregular


Abdomen:  no organomegaly





Microbiology








 Date/Time


Source Procedure


Growth Status


 


 


 3/3/20 17:45


Urine,Clean Catch Urine Culture - Preliminary


NO GROWTH Resulted











Laboratory Tests








Test


  3/3/20


17:45 3/4/20


05:34


 


Urine Color Yellow   


 


Urine Appearance Cloudy   


 


Urine pH 5 (4.5-8.0)   


 


Urine Specific Gravity


  1.015


(1.005-1.035) 


 


 


Urine Protein


  2+ (NEGATIVE)


H 


 


 


Urine Glucose (UA)


  Negative


(NEGATIVE) 


 


 


Urine Ketones


  1+ (NEGATIVE)


H 


 


 


Urine Blood


  5+ (NEGATIVE)


H 


 


 


Urine Nitrite


  Negative


(NEGATIVE) 


 


 


Urine Bilirubin


  Negative


(NEGATIVE) 


 


 


Urine Urobilinogen


  Normal MG/DL


(0.0-1.0) 


 


 


Urine Leukocyte Esterase


  3+ (NEGATIVE)


H 


 


 


Urine RBC


  30-40 /HPF (0


- 2)  H 


 


 


Urine WBC


  2-4 /HPF (0 -


2) 


 


 


Urine Squamous Epithelial


Cells Many /LPF


(NONE/OCC)  H 


 


 


Urine Bacteria


  Moderate /HPF


(NONE)  H 


 


 


White Blood Count


  


  11.8 K/UL


(4.8-10.8)  H


 


Red Blood Count


  


  3.37 M/UL


(4.20-5.40)  L


 


Hemoglobin


  


  9.6 G/DL


(12.0-16.0)  L


 


Hematocrit


  


  28.8 %


(37.0-47.0)  L


 


Mean Corpuscular Volume  86 FL (80-99)  


 


Mean Corpuscular Hemoglobin


  


  28.5 PG


(27.0-31.0)


 


Mean Corpuscular Hemoglobin


Concent 


  33.3 G/DL


(32.0-36.0)


 


Red Cell Distribution Width


  


  12.6 %


(11.6-14.8)


 


Platelet Count


  


  288 K/UL


(150-450)


 


Mean Platelet Volume


  


  5.7 FL


(6.5-10.1)  L


 


Neutrophils (%) (Auto)


  


  71.4 %


(45.0-75.0)


 


Lymphocytes (%) (Auto)


  


  18.0 %


(20.0-45.0)  L


 


Monocytes (%) (Auto)


  


  4.7 %


(1.0-10.0)


 


Eosinophils (%) (Auto)


  


  5.0 %


(0.0-3.0)  H


 


Basophils (%) (Auto)


  


  0.9 %


(0.0-2.0)


 


Sodium Level


  


  146 MMOL/L


(136-145)  H


 


Potassium Level


  


  3.6 MMOL/L


(3.5-5.1)


 


Chloride Level


  


  107 MMOL/L


()


 


Carbon Dioxide Level


  


  30 MMOL/L


(21-32)


 


Anion Gap


  


  9 mmol/L


(5-15)


 


Blood Urea Nitrogen


  


  23 mg/dL


(7-18)  H


 


Creatinine


  


  1.4 MG/DL


(0.55-1.30)  H


 


Estimat Glomerular Filtration


Rate 


  43.5 mL/min


(>60)


 


Glucose Level


  


  100 MG/DL


()


 


Uric Acid


  


  10.5 MG/DL


(2.6-7.2)  H


 


Calcium Level


  


  9.4 MG/DL


(8.5-10.1)


 


Phosphorus Level


  


  3.3 MG/DL


(2.5-4.9)


 


Magnesium Level


  


  2.1 MG/DL


(1.8-2.4)


 


Total Bilirubin


  


  0.5 MG/DL


(0.2-1.0)


 


Aspartate Amino Transf


(AST/SGOT) 


  13 U/L (15-37)


L


 


Alanine Aminotransferase


(ALT/SGPT) 


  11 U/L (12-78)


L


 


Alkaline Phosphatase


  


  56 U/L


()


 


C-Reactive Protein,


Quantitative 


  11.3 mg/dL


(0.00-0.90)  H


 


Pro-B-Type Natriuretic Peptide


  


  385 pg/mL


(0-125)  H


 


Total Protein


  


  6.5 G/DL


(6.4-8.2)


 


Albumin


  


  2.6 G/DL


(3.4-5.0)  L


 


Globulin  3.9 g/dL  


 


Albumin/Globulin Ratio


  


  0.7 (1.0-2.7)


L











Current Medications








 Medications


  (Trade)  Dose


 Ordered  Sig/Imelda


 Route


 PRN Reason  Start Time


 Stop Time Status Last Admin


Dose Admin


 


 Acetaminophen


  (Tylenol)  1,000 mg  Q6H  PRN


 ORAL


 Mild Pain/Temp > 100.5  2/25/20 22:00


 3/26/20 21:59  2/25/20 22:33


 


 


 Aspirin


  (Ecotrin)  81 mg  DAILY


 ORAL


   2/26/20 09:00


 3/27/20 08:59  3/4/20 08:45


 


 


 Carvedilol


  (Coreg)  25 mg  BID


 ORAL


   2/26/20 09:00


 3/27/20 08:59  3/4/20 08:45


 


 


 Daptomycin 400 mg/


 Sodium Chloride  55 ml @ 


 100 mls/hr  Q48H


 IV


   3/2/20 18:00


 3/9/20 17:59  3/2/20 18:50


 


 


 Docusate Sodium


  (Colace)  100 mg  THREE TIMES A  DAY


 ORAL


   3/2/20 13:00


 4/1/20 12:59  3/3/20 12:46


 


 


 Folic Acid


  (Folate)  1 mg  DAILY


 ORAL


   3/2/20 10:00


 4/1/20 09:59  3/4/20 08:45


 


 


 Lorazepam


  (Ativan 2mg/ml


 1ml)  2 mg  Q4H  PRN


 IV


 For Anxiety  2/28/20 12:15


 3/6/20 12:14   


 


 


 Losartan Potassium


  (Cozaar)  50 mg  DAILY


 ORAL


   2/26/20 09:00


 3/27/20 08:59  3/4/20 08:45


 


 


 Morphine Sulfate


  (Morphine


 Sulfate)  2 mg  Q4H  PRN


 IVP


 For severe Pain  2/28/20 15:15


 3/6/20 15:14  3/1/20 04:13


 


 


 Ondansetron HCl


  (Zofran)  4 mg  Q4H  PRN


 IVP


 Nausea & Vomiting  2/28/20 15:15


 3/29/20 15:14  3/1/20 04:13


 


 


 Pantoprazole


  (Protonix)  40 mg  EVERY 12  HOURS


 ORAL


   3/2/20 21:00


 4/1/20 20:59  3/4/20 08:46


 


 


 Rivaroxaban


  (Xarelto)  15 mg  BID


 ORAL


   2/26/20 18:00


 3/18/20 09:01  3/4/20 08:46


 


 


 Rivaroxaban


  (Xarelto)  20 mg  DAILY


 ORAL


   3/19/20 09:00


 4/18/20 08:59   


 

















Jorge Elizabeth MD Mar 4, 2020 09:15

## 2020-03-04 NOTE — NEPHROLOGY PROGRESS NOTE
Assessment/Plan


Problem List:  


(1) Acute on chronic diastolic congestive heart failure


Assessment:  Cr lower today





(2) Morbid obesity


(3) Peripheral edema


(4) Elephantiasis


Assessment





Acute renal failure as serum creatinine ashu from 1 from the date of admission 

TO 1.9 today.


Etiology is most likely overdiuresis and and partly treatment with vancomycin.


Anemia.


Other conditions:


1. Cellulitis of bilateral lower extremities.


2. Lymphedema bilateral lower extremities.


3. Bilateral leg pain.


4. Hypertension.


5.  Acute deep venous thrombosis left common femoral to superficial femoral vein


Plan





Serum creatinine improving.  It is now 1.4 down from 1.9


Hold Lasix.


Kidney ultrasound.  Negative ultrasound the kidneys. 5 mm left renal cyst.


Urine analysis since none was done since admission. still pending


Monitor renal parameters.


Chest x-ray.


Monitor Vanco levels.


trial Folic-B12- Venofer


Per orders.





Subjective


ROS Limited/Unobtainable:  No


Constitutional:  Reports: malaise





Objective


Objective





Last 24 Hour Vital Signs








  Date Time  Temp Pulse Resp B/P (MAP) Pulse Ox O2 Delivery O2 Flow Rate FiO2


 


3/4/20 12:00 98.1 75 18 128/52 (77) 95   


 


3/4/20 09:00      Room Air  


 


3/4/20 08:45    136/60    


 


3/4/20 08:45  84  136/60    


 


3/4/20 08:00 98.6 84 18 136/60 (85) 98   


 


3/4/20 04:23 98.3 82 18 134/54 (80) 92   


 


3/4/20 00:19 98.2 83 18 129/51 (77) 95   


 


3/3/20 21:00      Room Air  


 


3/3/20 20:00 98.2 80 16 108/48 (68) 95   


 


3/3/20 17:43  72  129/76    


 


3/3/20 16:00 98.3 72 20 129/76 (93) 95   

















Intake and Output  


 


 3/3/20 3/4/20





 19:00 07:00


 


Intake Total 360 ml 


 


Balance 360 ml 


 


  


 


Intake Oral 360 ml 


 


# Bowel Movements 1 











Current Medications








 Medications


  (Trade)  Dose


 Ordered  Sig/Imelda


 Route


 PRN Reason  Start Time


 Stop Time Status Last Admin


Dose Admin


 


 Acetaminophen


  (Tylenol)  1,000 mg  Q6H  PRN


 ORAL


 Mild Pain/Temp > 100.5  2/25/20 22:00


 3/26/20 21:59  2/25/20 22:33


 


 


 Aspirin


  (Ecotrin)  81 mg  DAILY


 ORAL


   2/26/20 09:00


 3/27/20 08:59  3/4/20 08:45


 


 


 Carvedilol


  (Coreg)  25 mg  BID


 ORAL


   2/26/20 09:00


 3/27/20 08:59  3/4/20 08:45


 


 


 Daptomycin 400 mg/


 Sodium Chloride  55 ml @ 


 100 mls/hr  Q48H


 IV


   3/2/20 18:00


 3/9/20 17:59  3/2/20 18:50


 


 


 Docusate Sodium


  (Colace)  100 mg  THREE TIMES A  DAY


 ORAL


   3/2/20 13:00


 4/1/20 12:59  3/3/20 12:46


 


 


 Folic Acid


  (Folate)  1 mg  DAILY


 ORAL


   3/2/20 10:00


 4/1/20 09:59  3/4/20 08:45


 


 


 Lorazepam


  (Ativan 2mg/ml


 1ml)  2 mg  Q4H  PRN


 IV


 For Anxiety  2/28/20 12:15


 3/6/20 12:14   


 


 


 Losartan Potassium


  (Cozaar)  50 mg  DAILY


 ORAL


   2/26/20 09:00


 3/27/20 08:59  3/4/20 08:45


 


 


 Morphine Sulfate


  (Morphine


 Sulfate)  2 mg  Q4H  PRN


 IVP


 For severe Pain  2/28/20 15:15


 3/6/20 15:14  3/1/20 04:13


 


 


 Ondansetron HCl


  (Zofran)  4 mg  Q4H  PRN


 IVP


 Nausea & Vomiting  2/28/20 15:15


 3/29/20 15:14  3/1/20 04:13


 


 


 Pantoprazole


  (Protonix)  40 mg  EVERY 12  HOURS


 ORAL


   3/2/20 21:00


 4/1/20 20:59  3/4/20 08:46


 


 


 Rivaroxaban


  (Xarelto)  15 mg  BID


 ORAL


   2/26/20 18:00


 3/18/20 09:01  3/4/20 08:46


 


 


 Rivaroxaban


  (Xarelto)  20 mg  DAILY


 ORAL


   3/19/20 09:00


 4/18/20 08:59   


 





Laboratory Tests


3/3/20 17:45: 


Urine Color Yellow, Urine Appearance Cloudy, Urine pH 5, Urine Specific Gravity 

1.015, Urine Protein 2+H, Urine Glucose (UA) Negative, Urine Ketones 1+H, Urine 

Blood 5+H, Urine Nitrite Negative, Urine Bilirubin Negative, Urine Urobilinogen 

Normal, Urine Leukocyte Esterase 3+H, Urine RBC 30-40H, Urine WBC 2-4, Urine 

Squamous Epithelial Cells ManyH, Urine Bacteria ModerateH


3/4/20 05:34: 


White Blood Count 11.8H, Red Blood Count 3.37L, Hemoglobin 9.6L, Hematocrit 

28.8L, Mean Corpuscular Volume 86, Mean Corpuscular Hemoglobin 28.5, Mean 

Corpuscular Hemoglobin Concent 33.3, Red Cell Distribution Width 12.6, Platelet 

Count 288, Mean Platelet Volume 5.7L, Neutrophils (%) (Auto) 71.4, Lymphocytes (

%) (Auto) 18.0L, Monocytes (%) (Auto) 4.7, Eosinophils (%) (Auto) 5.0H, 

Basophils (%) (Auto) 0.9, Sodium Level 146H, Potassium Level 3.6, Chloride 

Level 107, Carbon Dioxide Level 30, Anion Gap 9, Blood Urea Nitrogen 23H, 

Creatinine 1.4H, Estimat Glomerular Filtration Rate 43.5, Glucose Level 100, 

Uric Acid 10.5H, Calcium Level 9.4, Phosphorus Level 3.3, Magnesium Level 2.1, 

Total Bilirubin 0.5, Aspartate Amino Transf (AST/SGOT) 13L, Alanine 

Aminotransferase (ALT/SGPT) 11L, Alkaline Phosphatase 56, C-Reactive Protein, 

Quantitative 11.3H, Pro-B-Type Natriuretic Peptide 385H, Total Protein 6.5, 

Albumin 2.6L, Globulin 3.9, Albumin/Globulin Ratio 0.7L


Height (Feet):  5


Height (Inches):  2.00


Weight (Pounds):  210


General Appearance:  no apparent distress


Objective


no change











Neville Marshall MD Mar 4, 2020 15:15

## 2020-03-04 NOTE — PULMONOLOGY PROGRESS NOTE
Assessment/Plan


Problems:  


(1) ATN (acute tubular necrosis)


(2) Cellulitis


(3) Severe anemia


(4) Peripheral edema


(5) Elephantiasis


(6) Mild pulmonary hypertension


(7) Morbid obesity


(8) History of hypertension


(9) Acute DVT (deep venous thrombosis)


Assessment/Plan


wbc slightly lower


check cultures, still pending


doing better


all reviewed


on Xarelto for DVT


wound care,


iv abx


podiatry


monitor BP


f/u stool for OB was negative.


symptomatic treatment.





Subjective


ROS Limited/Unobtainable:  No


Interval Events:  no new complains


Allergies:  


Coded Allergies:  


     No Known Allergies (Unverified , 9/5/19)





Objective





Last 24 Hour Vital Signs








  Date Time  Temp Pulse Resp B/P (MAP) Pulse Ox O2 Delivery O2 Flow Rate FiO2


 


3/4/20 12:00 98.1 75 18 128/52 (77) 95   


 


3/4/20 09:00      Room Air  


 


3/4/20 08:45    136/60    


 


3/4/20 08:45  84  136/60    


 


3/4/20 08:00 98.6 84 18 136/60 (85) 98   


 


3/4/20 04:23 98.3 82 18 134/54 (80) 92   


 


3/4/20 00:19 98.2 83 18 129/51 (77) 95   


 


3/3/20 21:00      Room Air  


 


3/3/20 20:00 98.2 80 16 108/48 (68) 95   


 


3/3/20 17:43  72  129/76    


 


3/3/20 16:00 98.3 72 20 129/76 (93) 95   

















Intake and Output  


 


 3/3/20 3/4/20





 19:00 07:00


 


Intake Total 360 ml 


 


Balance 360 ml 


 


  


 


Intake Oral 360 ml 


 


# Bowel Movements 1 








General Appearance:  WD/WN


HEENT:  normocephalic, anicteric


Respiratory/Chest:  chest wall non-tender, lungs clear


Breasts:  no masses


Cardiovascular:  normal peripheral pulses, normal rate


Abdomen:  normal bowel sounds, soft, non tender, no organomegaly


Genitourinary:  normal external genitalia


Extremities:  no clubbing


Skin:  no rash





Microbiology








 Date/Time


Source Procedure


Growth Status


 


 


 3/3/20 17:45


Urine,Clean Catch Urine Culture - Preliminary


NO GROWTH Resulted








Laboratory Tests


3/3/20 17:45: 


Urine Color Yellow, Urine Appearance Cloudy, Urine pH 5, Urine Specific Gravity 

1.015, Urine Protein 2+H, Urine Glucose (UA) Negative, Urine Ketones 1+H, Urine 

Blood 5+H, Urine Nitrite Negative, Urine Bilirubin Negative, Urine Urobilinogen 

Normal, Urine Leukocyte Esterase 3+H, Urine RBC 30-40H, Urine WBC 2-4, Urine 

Squamous Epithelial Cells ManyH, Urine Bacteria ModerateH


3/4/20 05:34: 


White Blood Count 11.8H, Red Blood Count 3.37L, Hemoglobin 9.6L, Hematocrit 

28.8L, Mean Corpuscular Volume 86, Mean Corpuscular Hemoglobin 28.5, Mean 

Corpuscular Hemoglobin Concent 33.3, Red Cell Distribution Width 12.6, Platelet 

Count 288, Mean Platelet Volume 5.7L, Neutrophils (%) (Auto) 71.4, Lymphocytes (

%) (Auto) 18.0L, Monocytes (%) (Auto) 4.7, Eosinophils (%) (Auto) 5.0H, 

Basophils (%) (Auto) 0.9, Sodium Level 146H, Potassium Level 3.6, Chloride 

Level 107, Carbon Dioxide Level 30, Anion Gap 9, Blood Urea Nitrogen 23H, 

Creatinine 1.4H, Estimat Glomerular Filtration Rate 43.5, Glucose Level 100, 

Uric Acid 10.5H, Calcium Level 9.4, Phosphorus Level 3.3, Magnesium Level 2.1, 

Total Bilirubin 0.5, Aspartate Amino Transf (AST/SGOT) 13L, Alanine 

Aminotransferase (ALT/SGPT) 11L, Alkaline Phosphatase 56, C-Reactive Protein, 

Quantitative 11.3H, Pro-B-Type Natriuretic Peptide 385H, Total Protein 6.5, 

Albumin 2.6L, Globulin 3.9, Albumin/Globulin Ratio 0.7L





Current Medications








 Medications


  (Trade)  Dose


 Ordered  Sig/Imelda


 Route


 PRN Reason  Start Time


 Stop Time Status Last Admin


Dose Admin


 


 Acetaminophen


  (Tylenol)  1,000 mg  Q6H  PRN


 ORAL


 Mild Pain/Temp > 100.5  2/25/20 22:00


 3/26/20 21:59  2/25/20 22:33


 


 


 Aspirin


  (Ecotrin)  81 mg  DAILY


 ORAL


   2/26/20 09:00


 3/27/20 08:59  3/4/20 08:45


 


 


 Carvedilol


  (Coreg)  25 mg  BID


 ORAL


   2/26/20 09:00


 3/27/20 08:59  3/4/20 08:45


 


 


 Daptomycin 400 mg/


 Sodium Chloride  55 ml @ 


 100 mls/hr  Q48H


 IV


   3/2/20 18:00


 3/9/20 17:59  3/2/20 18:50


 


 


 Docusate Sodium


  (Colace)  100 mg  THREE TIMES A  DAY


 ORAL


   3/2/20 13:00


 4/1/20 12:59  3/3/20 12:46


 


 


 Folic Acid


  (Folate)  1 mg  DAILY


 ORAL


   3/2/20 10:00


 4/1/20 09:59  3/4/20 08:45


 


 


 Lorazepam


  (Ativan 2mg/ml


 1ml)  2 mg  Q4H  PRN


 IV


 For Anxiety  2/28/20 12:15


 3/6/20 12:14   


 


 


 Losartan Potassium


  (Cozaar)  50 mg  DAILY


 ORAL


   2/26/20 09:00


 3/27/20 08:59  3/4/20 08:45


 


 


 Morphine Sulfate


  (Morphine


 Sulfate)  2 mg  Q4H  PRN


 IVP


 For severe Pain  2/28/20 15:15


 3/6/20 15:14  3/1/20 04:13


 


 


 Ondansetron HCl


  (Zofran)  4 mg  Q4H  PRN


 IVP


 Nausea & Vomiting  2/28/20 15:15


 3/29/20 15:14  3/1/20 04:13


 


 


 Pantoprazole


  (Protonix)  40 mg  EVERY 12  HOURS


 ORAL


   3/2/20 21:00


 4/1/20 20:59  3/4/20 08:46


 


 


 Rivaroxaban


  (Xarelto)  15 mg  BID


 ORAL


   2/26/20 18:00


 3/18/20 09:01  3/4/20 08:46


 


 


 Rivaroxaban


  (Xarelto)  20 mg  DAILY


 ORAL


   3/19/20 09:00


 4/18/20 08:59   


 

















Aleisha Lagunas MD Mar 4, 2020 13:07

## 2020-03-04 NOTE — NUR
CASE MANAGEMENT:REVIEW



SI;ACUTE LLE DVT. BLE CELLULITIS. 

98.6   84   18   136/60   95% ON RA

WBC 11.8   H/H 9.6/28.8      BUN 23   CR 1.4   UR ACID 10.5   



IS;ZARELTO PO QD

PROTONIX PO Q12 HRS

DAPTOMYCIN IV Q48 HRS

ASA PO QD

COZAAR PO QD



******MED SURG STATUS



DCP;SNF PLACEMENT FOR PT

## 2020-03-04 NOTE — NUR
NURSE NOTES:



Received patient awake, alert, verbal, resting in bed, comfortable. Kept clean and dry.

## 2020-03-05 VITALS — SYSTOLIC BLOOD PRESSURE: 141 MMHG | DIASTOLIC BLOOD PRESSURE: 60 MMHG

## 2020-03-05 VITALS — DIASTOLIC BLOOD PRESSURE: 58 MMHG | SYSTOLIC BLOOD PRESSURE: 141 MMHG

## 2020-03-05 VITALS — SYSTOLIC BLOOD PRESSURE: 121 MMHG | DIASTOLIC BLOOD PRESSURE: 61 MMHG

## 2020-03-05 VITALS — SYSTOLIC BLOOD PRESSURE: 121 MMHG | DIASTOLIC BLOOD PRESSURE: 51 MMHG

## 2020-03-05 LAB
ADD MANUAL DIFF: NO
ANION GAP SERPL CALC-SCNC: 6 MMOL/L (ref 5–15)
BASOPHILS NFR BLD AUTO: 0.9 % (ref 0–2)
BUN SERPL-MCNC: 16 MG/DL (ref 7–18)
CALCIUM SERPL-MCNC: 9.3 MG/DL (ref 8.5–10.1)
CHLORIDE SERPL-SCNC: 108 MMOL/L (ref 98–107)
CO2 SERPL-SCNC: 32 MMOL/L (ref 21–32)
CREAT SERPL-MCNC: 1.3 MG/DL (ref 0.55–1.3)
EOSINOPHIL NFR BLD AUTO: 4.9 % (ref 0–3)
ERYTHROCYTE [DISTWIDTH] IN BLOOD BY AUTOMATED COUNT: 12.6 % (ref 11.6–14.8)
HCT VFR BLD CALC: 29 % (ref 37–47)
HGB BLD-MCNC: 9.8 G/DL (ref 12–16)
LYMPHOCYTES NFR BLD AUTO: 20.5 % (ref 20–45)
MCV RBC AUTO: 86 FL (ref 80–99)
MONOCYTES NFR BLD AUTO: 6.8 % (ref 1–10)
NEUTROPHILS NFR BLD AUTO: 66.9 % (ref 45–75)
PLATELET # BLD: 280 K/UL (ref 150–450)
POTASSIUM SERPL-SCNC: 3.8 MMOL/L (ref 3.5–5.1)
RBC # BLD AUTO: 3.39 M/UL (ref 4.2–5.4)
SODIUM SERPL-SCNC: 146 MMOL/L (ref 136–145)
WBC # BLD AUTO: 10.6 K/UL (ref 4.8–10.8)

## 2020-03-05 RX ADMIN — ASPIRIN SCH MG: 81 TABLET, DELAYED RELEASE ORAL at 09:28

## 2020-03-05 RX ADMIN — DOCUSATE SODIUM SCH MG: 100 CAPSULE, LIQUID FILLED ORAL at 13:00

## 2020-03-05 RX ADMIN — CARVEDILOL SCH MG: 25 TABLET, FILM COATED ORAL at 09:29

## 2020-03-05 RX ADMIN — RIVAROXABAN SCH MG: 15 TABLET, FILM COATED ORAL at 09:28

## 2020-03-05 RX ADMIN — DOCUSATE SODIUM SCH MG: 100 CAPSULE, LIQUID FILLED ORAL at 09:28

## 2020-03-05 RX ADMIN — LOSARTAN POTASSIUM SCH MG: 50 TABLET, FILM COATED ORAL at 09:28

## 2020-03-05 NOTE — NUR
NURSE NOTES:

Received pt from MARIELLA CARRILLO, Pt is awake and alert, pt has NC 2LMP, Pt has intact iv access R 
WRIST 24G SL. pt is eating breakfast by observation. all needs attended, bed is locked and 
is in the lowest position, call light within easy reach. will continue to monitor.

-------------------------------------------------------------------------------

Addendum: 03/05/20 at 0756 by Alpesh Renee RN

-------------------------------------------------------------------------------

ERROR

Pt is on RA, no SOB or acute respiratory distress noted.

## 2020-03-05 NOTE — PULMONOLOGY PROGRESS NOTE
Assessment/Plan


Problems:  


(1) ATN (acute tubular necrosis)


(2) Cellulitis


(3) Severe anemia


(4) Peripheral edema


(5) Elephantiasis


(6) Mild pulmonary hypertension


(7) Morbid obesity


(8) History of hypertension


(9) Acute DVT (deep venous thrombosis)


Assessment/Plan


wbc slightly lower


check cultures, still pending


doing better


all reviewed


on Xarelto for DVT


wound care,


iv abx


podiatry


monitor BP


f/u stool for OB was negative.


symptomatic treatment.





Subjective


ROS Limited/Unobtainable:  No


Constitutional:  Reports: no symptoms


HEENT:  Repors: no symptoms


Allergies:  


Coded Allergies:  


     No Known Allergies (Unverified , 9/5/19)





Objective





Last 24 Hour Vital Signs








  Date Time  Temp Pulse Resp B/P (MAP) Pulse Ox O2 Delivery O2 Flow Rate FiO2


 


3/5/20 12:00 98.1 76 19 121/61 (81) 95   


 


3/5/20 09:29  77  141/58    


 


3/5/20 09:28    141/58    


 


3/5/20 09:00      Room Air  


 


3/5/20 08:00 98.0 77 18 141/58 (85) 94   


 


3/5/20 04:39 97.8 76 18 141/60 (87) 98   


 


3/5/20 00:26 97.9 78 18 121/51 (74) 92   


 


3/4/20 20:56 98.2 78 18 118/49 (72) 98   


 


3/4/20 20:21      Room Air  


 


3/4/20 17:16  82  131/58    


 


3/4/20 16:00 98.3 82 18 131/58 (82) 97   

















Intake and Output  


 


 3/4/20 3/5/20





 19:00 07:00


 


Intake Total 1000 ml 360 ml


 


Output Total  1300 ml


 


Balance 1000 ml -940 ml


 


  


 


Intake Oral 1000 ml 


 


Other  360 ml


 


Output Urine Total  1300 ml








General Appearance:  WD/WN, no acute distress


HEENT:  normocephalic, anicteric


Respiratory/Chest:  chest wall non-tender, lungs clear


Breasts:  no masses


Cardiovascular:  normal peripheral pulses


Abdomen:  normal bowel sounds, no organomegaly


Genitourinary:  normal external genitalia


Skin:  no rash, no lesions





Microbiology








 Date/Time


Source Procedure


Growth Status


 


 


 3/3/20 17:45


Urine,Clean Catch Urine Culture - Preliminary Resulted








Laboratory Tests


3/5/20 05:50: 


White Blood Count 10.6, Red Blood Count 3.39L, Hemoglobin 9.8L, Hematocrit 29.0L

, Mean Corpuscular Volume 86, Mean Corpuscular Hemoglobin 28.9, Mean 

Corpuscular Hemoglobin Concent 33.8, Red Cell Distribution Width 12.6, Platelet 

Count 280, Mean Platelet Volume 5.7L, Neutrophils (%) (Auto) 66.9, Lymphocytes (

%) (Auto) 20.5, Monocytes (%) (Auto) 6.8, Eosinophils (%) (Auto) 4.9H, 

Basophils (%) (Auto) 0.9, Sodium Level 146H, Potassium Level 3.8, Chloride 

Level 108H, Carbon Dioxide Level 32, Anion Gap 6, Blood Urea Nitrogen 16, 

Creatinine 1.3, Estimat Glomerular Filtration Rate 47.4, Glucose Level 102, 

Calcium Level 9.3











Aleisha Lagunas MD Mar 5, 2020 13:45

## 2020-03-05 NOTE — NEPHROLOGY PROGRESS NOTE
Assessment/Plan


Problem List:  


(1) Acute on chronic diastolic congestive heart failure


Assessment:  Cr lower today





(2) Morbid obesity


(3) Peripheral edema


(4) Elephantiasis


Assessment





Acute renal failure as serum creatinine ashu from 1 from the date of admission 

TO 1.9 today.


Etiology is most likely overdiuresis and and partly treatment with vancomycin.


Anemia.


Other conditions:


1. Cellulitis of bilateral lower extremities.


2. Lymphedema bilateral lower extremities.


3. Bilateral leg pain.


4. Hypertension.


5.  Acute deep venous thrombosis left common femoral to superficial femoral vein


Plan





Serum creatinine improving.  It is now 1.3 down from 1.9


Hold Lasix.


Kidney ultrasound.  Negative ultrasound the kidneys. 5 mm left renal cyst.


Urine analysis since none was done since admission. still pending


Monitor renal parameters.


Chest x-ray.


Monitor Vanco levels.


trial Folic-B12- Venofer


Per orders.





Subjective


ROS Limited/Unobtainable:  No


Constitutional:  Reports: malaise





Objective


Objective





Last 24 Hour Vital Signs








  Date Time  Temp Pulse Resp B/P (MAP) Pulse Ox O2 Delivery O2 Flow Rate FiO2


 


3/5/20 09:29  77  141/58    


 


3/5/20 09:28    141/58    


 


3/5/20 09:00      Room Air  


 


3/5/20 08:00 98.0 77 18 141/58 (85) 94   


 


3/5/20 04:39 97.8 76 18 141/60 (87) 98   


 


3/5/20 00:26 97.9 78 18 121/51 (74) 92   


 


3/4/20 20:56 98.2 78 18 118/49 (72) 98   


 


3/4/20 20:21      Room Air  


 


3/4/20 17:16  82  131/58    


 


3/4/20 16:00 98.3 82 18 131/58 (82) 97   


 


3/4/20 12:00 98.1 75 18 128/52 (77) 95   

















Intake and Output  


 


 3/4/20 3/5/20





 19:00 07:00


 


Intake Total 1000 ml 360 ml


 


Output Total  1300 ml


 


Balance 1000 ml -940 ml


 


  


 


Intake Oral 1000 ml 


 


Other  360 ml


 


Output Urine Total  1300 ml











Current Medications








 Medications


  (Trade)  Dose


 Ordered  Sig/Imelda


 Route


 PRN Reason  Start Time


 Stop Time Status Last Admin


Dose Admin


 


 Acetaminophen


  (Tylenol)  1,000 mg  Q6H  PRN


 ORAL


 Mild Pain/Temp > 100.5  2/25/20 22:00


 3/26/20 21:59  2/25/20 22:33


 


 


 Aspirin


  (Ecotrin)  81 mg  DAILY


 ORAL


   2/26/20 09:00


 3/27/20 08:59  3/5/20 09:28


 


 


 Carvedilol


  (Coreg)  25 mg  BID


 ORAL


   2/26/20 09:00


 3/27/20 08:59  3/5/20 09:29


 


 


 Daptomycin 400 mg/


 Sodium Chloride  55 ml @ 


 100 mls/hr  Q48H


 IV


   3/2/20 18:00


 3/9/20 17:59  3/4/20 17:39


 


 


 Docusate Sodium


  (Colace)  100 mg  THREE TIMES A  DAY


 ORAL


   3/2/20 13:00


 4/1/20 12:59  3/5/20 09:28


 


 


 Folic Acid


  (Folate)  1 mg  DAILY


 ORAL


   3/2/20 10:00


 4/1/20 09:59  3/5/20 09:28


 


 


 Lorazepam


  (Ativan 2mg/ml


 1ml)  2 mg  Q4H  PRN


 IV


 For Anxiety  2/28/20 12:15


 3/6/20 12:14   


 


 


 Losartan Potassium


  (Cozaar)  50 mg  DAILY


 ORAL


   2/26/20 09:00


 3/27/20 08:59  3/5/20 09:28


 


 


 Morphine Sulfate


  (Morphine


 Sulfate)  2 mg  Q4H  PRN


 IVP


 For severe Pain  2/28/20 15:15


 3/6/20 15:14  3/1/20 04:13


 


 


 Ondansetron HCl


  (Zofran)  4 mg  Q4H  PRN


 IVP


 Nausea & Vomiting  2/28/20 15:15


 3/29/20 15:14  3/1/20 04:13


 


 


 Pantoprazole


  (Protonix)  40 mg  EVERY 12  HOURS


 ORAL


   3/2/20 21:00


 4/1/20 20:59  3/5/20 09:28


 


 


 Rivaroxaban


  (Xarelto)  15 mg  BID


 ORAL


   2/26/20 18:00


 3/18/20 09:01  3/5/20 09:28


 


 


 Rivaroxaban


  (Xarelto)  20 mg  DAILY


 ORAL


   3/19/20 09:00


 4/18/20 08:59   


 





Laboratory Tests


3/5/20 05:50: 


White Blood Count 10.6, Red Blood Count 3.39L, Hemoglobin 9.8L, Hematocrit 29.0L

, Mean Corpuscular Volume 86, Mean Corpuscular Hemoglobin 28.9, Mean 

Corpuscular Hemoglobin Concent 33.8, Red Cell Distribution Width 12.6, Platelet 

Count 280, Mean Platelet Volume 5.7L, Neutrophils (%) (Auto) 66.9, Lymphocytes (

%) (Auto) 20.5, Monocytes (%) (Auto) 6.8, Eosinophils (%) (Auto) 4.9H, 

Basophils (%) (Auto) 0.9, Sodium Level 146H, Potassium Level 3.8, Chloride 

Level 108H, Carbon Dioxide Level 32, Anion Gap 6, Blood Urea Nitrogen 16, 

Creatinine 1.3, Estimat Glomerular Filtration Rate 47.4, Glucose Level 102, 

Calcium Level 9.3


Height (Feet):  5


Height (Inches):  2.00


Weight (Pounds):  210


General Appearance:  no apparent distress


Objective


no change











Neville Marshall MD Mar 5, 2020 11:25

## 2020-03-05 NOTE — NUR
****DISCHARGE PLANNED***



PATIENT IS BEING ACCEPT[facility] AND [facility]:

 

[facility]

P: [phone]

F: [phone]

# [id number] SKILLED

[facility]  S/W LIUDMILA STOKES 11:30AM/ 1130AM

## 2020-03-05 NOTE — NUR
NURSE NOTES:

pt has discharge order, all D/C assessments and instructions done and pt verbally confirmed 
to understand all. pt is stable, V/S stable, all belongings checked with RN and are with pt. 
Report given to SNF MIRA CORNELIUS RN knows pt has iv ATB for 3/6/2020 and 3/8/2020 and 
xarelto 15mg bid till 3/18 and 20mg QD from 3/19/2020. pt's niece SURAJ is aware about 
discharging and at first she refused to send pt to SNF till she call back me and she did and 
stated go ahead and D/C pt. CM is aware. iv access D/C. RN took pictures from leg and L HEEL 
but camera   and no camera available, and RN couldn't  take more pictures and upload 
them,  SORAIDA is aware. wound care done as order. iv access D/C. pt left hospital 
with accompany of ambulance personnel.

## 2020-03-05 NOTE — NUR
****DISCHARGE PLANNING***



PATIENT HAS BEEN ACCEPTED TO 

[facility]

P: [phone]

F: [phone]



ROOM ASSIGNMENT 

231-1

SKILLED



TRANSPORTATION TO BE SCHEDULED PENDING OFFICIAL DISCHARGE ORDER FROM MD

-------------------------------------------------------------------------------

Addendum: 03/05/20 at 0944 by BO KEENE LVN LVN

-------------------------------------------------------------------------------

BLS TRANSPORTATION SCHEDULE WITH [facility] EXT 3997 WITH ETA @ 1130 AM PER LIUD[facility].

CHARGE NURSE JOSEPH MORA

## 2020-03-05 NOTE — INTERNAL MED PROGRESS NOTE
Subjective


Physician Name


Chris Yarbrough


Attending Physician


Chris Yarbrough MD





Current Medications








 Medications


  (Trade)  Dose


 Ordered  Sig/Imelda


 Route


 PRN Reason  Start Time


 Stop Time Status Last Admin


Dose Admin


 


 Acetaminophen


  (Tylenol)  1,000 mg  Q6H  PRN


 ORAL


 Mild Pain/Temp > 100.5  2/25/20 22:00


 3/26/20 21:59  2/25/20 22:33


 


 


 Aspirin


  (Ecotrin)  81 mg  DAILY


 ORAL


   2/26/20 09:00


 3/27/20 08:59  3/5/20 09:28


 


 


 Carvedilol


  (Coreg)  25 mg  BID


 ORAL


   2/26/20 09:00


 3/27/20 08:59  3/5/20 09:29


 


 


 Daptomycin 400 mg/


 Sodium Chloride  55 ml @ 


 100 mls/hr  Q48H


 IV


   3/2/20 18:00


 3/9/20 17:59  3/4/20 17:39


 


 


 Docusate Sodium


  (Colace)  100 mg  THREE TIMES A  DAY


 ORAL


   3/2/20 13:00


 4/1/20 12:59  3/5/20 09:28


 


 


 Folic Acid


  (Folate)  1 mg  DAILY


 ORAL


   3/2/20 10:00


 4/1/20 09:59  3/5/20 09:28


 


 


 Lorazepam


  (Ativan 2mg/ml


 1ml)  2 mg  Q4H  PRN


 IV


 For Anxiety  2/28/20 12:15


 3/6/20 12:14   


 


 


 Losartan Potassium


  (Cozaar)  50 mg  DAILY


 ORAL


   2/26/20 09:00


 3/27/20 08:59  3/5/20 09:28


 


 


 Morphine Sulfate


  (Morphine


 Sulfate)  2 mg  Q4H  PRN


 IVP


 For severe Pain  2/28/20 15:15


 3/6/20 15:14  3/1/20 04:13


 


 


 Ondansetron HCl


  (Zofran)  4 mg  Q4H  PRN


 IVP


 Nausea & Vomiting  2/28/20 15:15


 3/29/20 15:14  3/1/20 04:13


 


 


 Pantoprazole


  (Protonix)  40 mg  EVERY 12  HOURS


 ORAL


   3/2/20 21:00


 4/1/20 20:59  3/5/20 09:28


 


 


 Rivaroxaban


  (Xarelto)  15 mg  BID


 ORAL


   2/26/20 18:00


 3/18/20 09:01  3/5/20 09:28


 


 


 Rivaroxaban


  (Xarelto)  20 mg  DAILY


 ORAL


   3/19/20 09:00


 4/18/20 08:59   


 








Allergies:  


Coded Allergies:  


     No Known Allergies (Unverified , 9/5/19)


Subjective


Awake, alert, responsive, denies any chest pain or shortness of breath, WBC; 

10.6.





Objective





Last Vital Signs








  Date Time  Temp Pulse Resp B/P (MAP) Pulse Ox O2 Delivery O2 Flow Rate FiO2


 


3/5/20 09:29  77  141/58    


 


3/5/20 09:00      Room Air  


 


3/5/20 08:00 98.0  18  94   











Laboratory Tests








Test


  3/5/20


05:50


 


White Blood Count


  10.6 K/UL


(4.8-10.8)


 


Red Blood Count


  3.39 M/UL


(4.20-5.40)  L


 


Hemoglobin


  9.8 G/DL


(12.0-16.0)  L


 


Hematocrit


  29.0 %


(37.0-47.0)  L


 


Mean Corpuscular Volume 86 FL (80-99)  


 


Mean Corpuscular Hemoglobin


  28.9 PG


(27.0-31.0)


 


Mean Corpuscular Hemoglobin


Concent 33.8 G/DL


(32.0-36.0)


 


Red Cell Distribution Width


  12.6 %


(11.6-14.8)


 


Platelet Count


  280 K/UL


(150-450)


 


Mean Platelet Volume


  5.7 FL


(6.5-10.1)  L


 


Neutrophils (%) (Auto)


  66.9 %


(45.0-75.0)


 


Lymphocytes (%) (Auto)


  20.5 %


(20.0-45.0)


 


Monocytes (%) (Auto)


  6.8 %


(1.0-10.0)


 


Eosinophils (%) (Auto)


  4.9 %


(0.0-3.0)  H


 


Basophils (%) (Auto)


  0.9 %


(0.0-2.0)


 


Sodium Level


  146 MMOL/L


(136-145)  H


 


Potassium Level


  3.8 MMOL/L


(3.5-5.1)


 


Chloride Level


  108 MMOL/L


()  H


 


Carbon Dioxide Level


  32 MMOL/L


(21-32)


 


Anion Gap


  6 mmol/L


(5-15)


 


Blood Urea Nitrogen


  16 mg/dL


(7-18)


 


Creatinine


  1.3 MG/DL


(0.55-1.30)


 


Estimat Glomerular Filtration


Rate 47.4 mL/min


(>60)


 


Glucose Level


  102 MG/DL


()


 


Calcium Level


  9.3 MG/DL


(8.5-10.1)











Microbiology








 Date/Time


Source Procedure


Growth Status


 


 


 3/3/20 17:45


Urine,Clean Catch Urine Culture - Preliminary Resulted

















Intake and Output  


 


 3/4/20 3/5/20





 19:00 07:00


 


Intake Total 1000 ml 360 ml


 


Output Total  1300 ml


 


Balance 1000 ml -940 ml


 


  


 


Intake Oral 1000 ml 


 


Other  360 ml


 


Output Urine Total  1300 ml








Objective


General: No acute distress, awake and alert


HEENT: NCAT, sclera anicteric, PERRL, EOMI.


Neck: Supple, no significant jugular venous distention, 


Lungs: Good inspiratory effort, clear to auscultation bilaterally, no Wheeze or 

Rales.


Heart: Regular rate and rhythm, normal S1/S2, no murmur.


Abdomen: soft, nontender, nondistended. Normoactive bowel sounds, Obesity.


 / Rectal: Refused and deferred.


Extremities: No Cyanosis or clubbing, bilateral lower extremity 

hyperpigmentation and decreased edema, decreased lower extremity erythema.


Neuro: A&O x 3, Able to move all extremities


Skin: warm, no rash.


Psych: Normal mood and affect





Assessment/Plan


Assessment/Plan


ASSESSMENT:  This is an 85-year-old  female:





1. Cellulitis of bilateral lower extremities.


2. Lymphedema bilateral lower extremities.


3. Bilateral leg pain.


4. Hypertension.


5.  Acute deep venous thrombosis left common femoral to superficial


femoral vein


6.  Acute on chronic congestive heart failure.





TREATMENT:


1. Cellulitis of the bilateral lower extremities.


ABX= daptomycin. 


Infectious Disease consultation has been obtained with Dr. Elizabeth.  We


will follow recommendations of Infectious Disease.


2. Hypertension.  Continue Cozaar and Coreg as above.


3.  On Xarelto


4.  Charge patient to nursing facility today.











Chris Yarbrough MD Mar 5, 2020 13:06

## 2020-03-07 NOTE — DISCHARGE SUMMARY
Discharge Summary


Discharge Summary


_


DATE OF ADMISSION: 2/25/2020





DATE OF DISCHARGE: 3/5/2020








DISCHARGED BY: Dr. Yarbrough





REASON FOR ADMISSION: 


85 years old female with past medical history of hypertension, was admitted 

prior to [facility] in September 2019 for bilateral leg swelling.  


At that time patient was diagnosed with chronic lymphedema of bilateral lower 

extremity.  


Patient presented this time  with a chief complaint of bilateral leg pain and 

swelling.  


Patient initially  presented to [facility] by paramedics,  but 

then transferred to [facility] for insurance purposes.  


 


CONSULTANTS:


pulmonary Dr. Lagunas


ID specialist Dr. Elizabeth


nephrologist Dr. Marshall 


podiatrist Dr. Little





[facility] COURSE: 


Patient   admitted  to medical surgical floor.  


Patient started on antibiotic as per ID specialist recommendation for  

cellulitis left lower extremity. .


Blood cultures were negative.  


Urine culture revealed Candida.  


Venous duplex bilateral reveal acute DVT left common femoral vein and 

superficial femoral vein . 


Patient started on Xarelto.  


Hemoglobin and hematocrit were closely monitored with goal to keep hemoglobin 

above 7.  


Stool for occult blood was negative. CEA was within normal limits.  


Patient initially was on the IV Lasix with close monitoring of volumes and 

renal parameters as well as the monitoring for edema.  


Blood pressure was managed with beta-blocker and ARB.  





No need for any surgical intervention at this time as per podiatrist.  


Podiatrist recommended to offload.  


Supportive care provided.  


Pain management was addressed as needed.  


Bowel regimen instituted.  





Patient clinically stabilized and was ready for transfer back to skilled 

nursing facility for continuation of care











FINAL DIAGNOSES: 


Left lower extremity cellulitis


Acute DVT left CFV and SFV


Lymphedema


Morbid obesity


Hypertension


Mild pulmonary hypertension





DISCHARGE MEDICATIONS:


See Medication Reconciliation list.





DISCHARGE INSTRUCTIONS:


Patient was discharged to the skilled nursing facility. 


Follow up with medical doctor at the facility.











Lennie Rutledge NP Mar 7, 2020 12:26

## 2022-05-02 NOTE — NUR
*-*DISCHARGE PLANNING*-*



PATIENT HAS BEEN REFERRED TO:



[facility] 

 P: [phone]

 F: [phone]



[facility] 

 P: [phone]

 F: [phone]



[facility]

 P: [phone]

 F: [phone]



[facility]

 P: [phone]

 F: [phone]



[facility]

 P:[phone]

 F:[phone]



[facility]

 P: [phone]

 F: [phone]

 



*--*-*****CLINICALS FAXED*-****-*-*-*-* Medication(s) requested: Alprazolam   Last office visit: 2-7-22  Next office visit: 6-9-22  Last refill given: 3-2-22  Last refill picked up from pharmacy: 4-1-22  Contract present: yes  Date of contract: 8-6-21  Last urine drug screen: 8-4-21  Is the patient due for refill of this medication(s): Yes  PDMP review: Criteria met. Forwarded to Physician/MILAGROS for signature.

## 2022-10-21 NOTE — INFECTIOUS DISEASES PROG NOTE
Assessment/Plan


Assessment/Plan


ASSESSMENT:  The patient is an 85-year-old female with 





Lower extremity lymphedema/cellulitis


Left lower extremity edema.


Normal white blood cells and afebrile








DVT: Acute DVT in the left common femoral vein and superficial femoral vein


Hypertension.


Bilateral lymphedema








PLAN:








Continue the patient on IV vancomycin # 3 





    2/27 Sp  Rocephin # 1 


Monitor CBC


Monitor BMP.


Blood cultures.





Subjective


Allergies:  


Coded Allergies:  


     No Known Allergies (Unverified , 9/5/19)


Subjective


afebrile


no leukocytosis


Bcx NTD





Objective


Vital Signs





Last 24 Hour Vital Signs








  Date Time  Temp Pulse Resp B/P (MAP) Pulse Ox O2 Delivery O2 Flow Rate FiO2


 


2/29/20 12:00 97.9 75 17 113/51 (71) 92   


 


2/29/20 09:00      Room Air  


 


2/29/20 08:34    123/73    


 


2/29/20 08:33  86  123/73    


 


2/29/20 08:00 98.0 86 18 123/73 (90) 99   


 


2/29/20 04:00 98.2 84 18 124/49 (74) 96   


 


2/29/20 00:00 98.8 82 16 103/47 (65) 92   


 


2/28/20 22:52 99.0       


 


2/28/20 21:00      Room Air  


 


2/28/20 20:00 98.9 84 18 99/51 (67) 100   


 


2/28/20 17:27  89  123/68    


 


2/28/20 16:00 99.0 84 18 122/61 (81) 98   








Height (Feet):  5


Height (Inches):  2.00


Weight (Pounds):  227


Objective


GENERAL:  The patient is a well-developed, well-nourished 


female, in no apparent distress.


HEENT:  Eyes, pupils equal and responsive to light and accommodation.


Extraocular movements are intact.


NECK:  Supple without lymphadenopathy.


CHEST:  Lungs are clear to auscultation bilaterally without wheezes or


rales.


CARDIOVASCULAR:  Regular rhythm and rate.  S1, S2 normal without murmurs,


rubs, or gallops.


ABDOMEN:  Soft, nontender, and nondistended.  Positive bowel sounds.  No


evidence of hepatosplenomegaly.  Currently, no rebound or guarding.


EXTREMITIES:  A 2+ pitting edema from the ankles to the knees bilaterally


with erythema of the left anterior calf from the ankle to the knee, left


greater than right.





Microbiology








 Date/Time


Source Procedure


Growth Status


 


 


 2/27/20 17:08


Blood Blood Culture - Preliminary


NO GROWTH AFTER 24 HOURS Resulted


 


 2/27/20 17:00


Blood Blood Culture - Preliminary


NO GROWTH AFTER 24 HOURS Resulted











Laboratory Tests








Test


  2/28/20


23:07 2/29/20


04:30


 


Vancomycin Level Trough


  16.0 ug/mL


(5.0-12.0)  H 


 


 


White Blood Count


  


  10.8 K/UL


(4.8-10.8)


 


Red Blood Count


  


  3.45 M/UL


(4.20-5.40)  L


 


Hemoglobin


  


  9.9 G/DL


(12.0-16.0)  L


 


Hematocrit


  


  29.8 %


(37.0-47.0)  L


 


Mean Corpuscular Volume  86 FL (80-99)  


 


Mean Corpuscular Hemoglobin


  


  28.7 PG


(27.0-31.0)


 


Mean Corpuscular Hemoglobin


Concent 


  33.3 G/DL


(32.0-36.0)


 


Red Cell Distribution Width


  


  12.8 %


(11.6-14.8)


 


Platelet Count


  


  224 K/UL


(150-450)


 


Mean Platelet Volume


  


  5.7 FL


(6.5-10.1)  L


 


Neutrophils (%) (Auto)


  


  68.6 %


(45.0-75.0)


 


Lymphocytes (%) (Auto)


  


  19.0 %


(20.0-45.0)  L


 


Monocytes (%) (Auto)


  


  7.6 %


(1.0-10.0)


 


Eosinophils (%) (Auto)


  


  4.2 %


(0.0-3.0)  H


 


Basophils (%) (Auto)


  


  0.7 %


(0.0-2.0)


 


Sodium Level


  


  138 MMOL/L


(136-145)


 


Potassium Level


  


  4.4 MMOL/L


(3.5-5.1)


 


Chloride Level


  


  100 MMOL/L


()


 


Carbon Dioxide Level


  


  28 MMOL/L


(21-32)


 


Anion Gap


  


  10 mmol/L


(5-15)


 


Blood Urea Nitrogen


  


  19 mg/dL


(7-18)  H


 


Creatinine


  


  1.2 MG/DL


(0.55-1.30)


 


Estimat Glomerular Filtration


Rate 


  52.0 mL/min


(>60)


 


Glucose Level


  


  70 MG/DL


()  L


 


Calcium Level


  


  9.1 MG/DL


(8.5-10.1)











Current Medications








 Medications


  (Trade)  Dose


 Ordered  Sig/Imelda


 Route


 PRN Reason  Start Time


 Stop Time Status Last Admin


Dose Admin


 


 Acetaminophen


  (Tylenol)  1,000 mg  Q6H  PRN


 ORAL


 Mild Pain/Temp > 100.5  2/25/20 22:00


 3/26/20 21:59  2/25/20 22:33


 


 


 Aspirin


  (Ecotrin)  81 mg  DAILY


 ORAL


   2/26/20 09:00


 3/27/20 08:59  2/29/20 08:33


 


 


 Carvedilol


  (Coreg)  25 mg  BID


 ORAL


   2/26/20 09:00


 3/27/20 08:59  2/29/20 08:33


 


 


 Furosemide


  (Lasix)  40 mg  DAILY


 IV


   2/26/20 09:00


 3/27/20 08:59  2/29/20 08:34


 


 


 Lorazepam


  (Ativan 2mg/ml


 1ml)  2 mg  Q4H  PRN


 IV


 For Anxiety  2/28/20 12:15


 3/6/20 12:14   


 


 


 Lorazepam


  (Ativan)  1 mg  Q6H  PRN


 ORAL


 For Anxiety  2/25/20 22:00


 3/3/20 21:59   


 


 


 Losartan Potassium


  (Cozaar)  50 mg  DAILY


 ORAL


   2/26/20 09:00


 3/27/20 08:59  2/29/20 08:34


 


 


 Morphine Sulfate


  (Morphine


 Sulfate)  2 mg  Q4H  PRN


 IVP


 For severe Pain  2/28/20 15:15


 3/6/20 15:14  2/29/20 10:23


 


 


 Ondansetron HCl


  (Zofran)  4 mg  Q4H  PRN


 IVP


 Nausea & Vomiting  2/28/20 15:15


 3/29/20 15:14  2/29/20 05:37


 


 


 Rivaroxaban


  (Xarelto)  15 mg  BID


 ORAL


   2/26/20 18:00


 3/18/20 09:01  2/29/20 08:33


 


 


 Rivaroxaban


  (Xarelto)  20 mg  DAILY


 ORAL


   3/19/20 09:00


 4/18/20 08:59   


 


 


 Tramadol HCl


  (Ultram)  50 mg  Q6H  PRN


 ORAL


 Severe Pain (Pain Scale 7-10)  2/25/20 22:00


 3/3/20 21:59  2/29/20 08:33


 


 


 Vancomycin HCl


  (Vanco rx to


 dose)  1 ea  DAILY  PRN


 MISC


 Per rx protocol  2/25/20 22:00


 3/26/20 21:59   


 


 


 Vancomycin HCl 1


 gm/Dextrose  275 ml @ 


 183.708


 mls/hr  Q24H


 IVPB


   3/1/20 00:00


 3/6/20 00:00   


 

















Heather Salcido M.D. Feb 29, 2020 14:01 normal (ped)...

## 2024-02-27 NOTE — NUR
CASE MANAGEMENT:REVIEW



DIRECT ADMIT FROM [facility]



SI: CHF

99.9   90  20  127/68   96% ON 2L/NC

WBC+12.3   H/H-10.2/29.8    K-3.0   BUN+40   CR+1.5



IS: IV VANCOMYCIN X1

IV ROCEPHIN Q24

HEPARIN SQ Q12

ASA PO QD

IV LASIX Q12

COREG PO Q12

K-DUR PO Q8

**: TELEMETRY STATUS



**INTERQUAL CRITERIA MET Yes

## 2025-05-15 NOTE — CDS PHYSICIAN QUERY
Clarification is required for compliance, coding accuracy, and to reflect 
severity of illness for this patient



Dear Aleisha Dunaway MD                              Date:  09/06/2019



CDS: [facility]





According to the clinical indications above, please indicate below the condition



Pt has: Cellulitis



Labs: WBC: 12.3-->13



Vitals: HR: 90/min; Temp: 99.9



Tx: IV VANCOMYCIN, IV CEFTRIAXONE





PHYSICIAN RESPONSE:



x Sepsis         

 SIRS      

 SIRS with organ dysfunction

 Septic Shock       

 Not applicable         

 Other:                      

 

 

 

Present on Admission:   x Yes            No           Clinically Undetermined





_________________                                    _____________

Physician signature                                       Date



Please also document in your Progress Notes and/or Discharge Summary and 
indicate if the condition was present on admission.

MTDD
Clarification is required for compliance, coding accuracy, and to reflect 
severity of illness for this patient



Dear Aleisha Dunaway MD   Date: 09/06/2019



/CDS Name: [facility]   





"Heart Failure / CHF" documented in Progress notes,



Pt has SOB



Tx: IV FUROSEMIDE





Please Clarify:



Acuity

   

[] Acute   

[x] Chronic   

[x] Acute on Chronic



Type  

      

[] Systolic

[x] Diastolic         

[] Systolic & Diastolic (Combined)

[] Other: _____________________________







Present on Admission:  [x]  Yes          []  No         []  Clinically 
Undetermined





_________________                                    _____________

Physician signature                                       Date



Please also document in your Progress Notes and/or Discharge Summary and 
indicate if the condition was present on admission.

MERD
15